# Patient Record
Sex: MALE | Race: WHITE | NOT HISPANIC OR LATINO | Employment: FULL TIME | URBAN - METROPOLITAN AREA
[De-identification: names, ages, dates, MRNs, and addresses within clinical notes are randomized per-mention and may not be internally consistent; named-entity substitution may affect disease eponyms.]

---

## 2017-01-03 ENCOUNTER — TRANSFERRED RECORDS (OUTPATIENT)
Dept: HEALTH INFORMATION MANAGEMENT | Facility: CLINIC | Age: 47
End: 2017-01-03

## 2017-01-10 ENCOUNTER — OFFICE VISIT (OUTPATIENT)
Dept: FAMILY MEDICINE | Facility: CLINIC | Age: 47
End: 2017-01-10
Payer: COMMERCIAL

## 2017-01-10 VITALS
TEMPERATURE: 97.9 F | WEIGHT: 242 LBS | BODY MASS INDEX: 32.78 KG/M2 | SYSTOLIC BLOOD PRESSURE: 136 MMHG | HEIGHT: 72 IN | OXYGEN SATURATION: 96 % | HEART RATE: 96 BPM | DIASTOLIC BLOOD PRESSURE: 86 MMHG

## 2017-01-10 DIAGNOSIS — M25.562 ACUTE PAIN OF LEFT KNEE: ICD-10-CM

## 2017-01-10 DIAGNOSIS — G43.109 MIGRAINE WITH AURA AND WITHOUT STATUS MIGRAINOSUS, NOT INTRACTABLE: ICD-10-CM

## 2017-01-10 DIAGNOSIS — D12.6 BENIGN NEOPLASM OF COLON, UNSPECIFIED PART OF COLON: ICD-10-CM

## 2017-01-10 DIAGNOSIS — I10 HYPERTENSION GOAL BP (BLOOD PRESSURE) < 140/90: ICD-10-CM

## 2017-01-10 DIAGNOSIS — Z79.4 TYPE 2 DIABETES MELLITUS WITH STABLE PROLIFERATIVE RETINOPATHY, WITH LONG-TERM CURRENT USE OF INSULIN, UNSPECIFIED LATERALITY (H): ICD-10-CM

## 2017-01-10 DIAGNOSIS — E11.3559 TYPE 2 DIABETES MELLITUS WITH STABLE PROLIFERATIVE RETINOPATHY, WITH LONG-TERM CURRENT USE OF INSULIN, UNSPECIFIED LATERALITY (H): ICD-10-CM

## 2017-01-10 DIAGNOSIS — Z12.11 SCREEN FOR COLON CANCER: ICD-10-CM

## 2017-01-10 DIAGNOSIS — R45.86 MOOD CHANGES: ICD-10-CM

## 2017-01-10 DIAGNOSIS — E78.5 HYPERLIPIDEMIA WITH TARGET LDL LESS THAN 70: ICD-10-CM

## 2017-01-10 DIAGNOSIS — K21.9 GASTROESOPHAGEAL REFLUX DISEASE WITHOUT ESOPHAGITIS: ICD-10-CM

## 2017-01-10 DIAGNOSIS — Z00.00 ENCOUNTER FOR ROUTINE ADULT HEALTH EXAMINATION WITHOUT ABNORMAL FINDINGS: Primary | ICD-10-CM

## 2017-01-10 DIAGNOSIS — N52.9 ERECTILE DYSFUNCTION, UNSPECIFIED ERECTILE DYSFUNCTION TYPE: ICD-10-CM

## 2017-01-10 DIAGNOSIS — E11.3599 PROLIFERATIVE DIABETIC RETINOPATHY WITHOUT MACULAR EDEMA ASSOCIATED WITH TYPE 2 DIABETES MELLITUS (H): ICD-10-CM

## 2017-01-10 DIAGNOSIS — Z51.81 MEDICATION MONITORING ENCOUNTER: ICD-10-CM

## 2017-01-10 DIAGNOSIS — Z12.5 SCREENING FOR PROSTATE CANCER: ICD-10-CM

## 2017-01-10 DIAGNOSIS — E66.811 OBESITY (BMI 30.0-34.9): ICD-10-CM

## 2017-01-10 LAB
ALBUMIN UR-MCNC: 100 MG/DL
APPEARANCE UR: CLEAR
BILIRUB UR QL STRIP: NEGATIVE
COLOR UR AUTO: YELLOW
ERYTHROCYTE [DISTWIDTH] IN BLOOD BY AUTOMATED COUNT: 13 % (ref 10–15)
GLUCOSE UR STRIP-MCNC: NEGATIVE MG/DL
HBA1C MFR BLD: 10 % (ref 4.3–6)
HCT VFR BLD AUTO: 41.8 % (ref 40–53)
HGB BLD-MCNC: 14.6 G/DL (ref 13.3–17.7)
HGB UR QL STRIP: ABNORMAL
KETONES UR STRIP-MCNC: NEGATIVE MG/DL
LEUKOCYTE ESTERASE UR QL STRIP: NEGATIVE
MCH RBC QN AUTO: 27.7 PG (ref 26.5–33)
MCHC RBC AUTO-ENTMCNC: 34.9 G/DL (ref 31.5–36.5)
MCV RBC AUTO: 79 FL (ref 78–100)
MUCOUS THREADS #/AREA URNS LPF: PRESENT /LPF
NITRATE UR QL: NEGATIVE
PH UR STRIP: 5.5 PH (ref 5–7)
PLATELET # BLD AUTO: 214 10E9/L (ref 150–450)
RBC # BLD AUTO: 5.28 10E12/L (ref 4.4–5.9)
RBC #/AREA URNS AUTO: ABNORMAL /HPF (ref 0–2)
SP GR UR STRIP: >1.03 (ref 1–1.03)
URN SPEC COLLECT METH UR: ABNORMAL
UROBILINOGEN UR STRIP-ACNC: 0.2 EU/DL (ref 0.2–1)
WBC # BLD AUTO: 8.6 10E9/L (ref 4–11)
WBC #/AREA URNS AUTO: ABNORMAL /HPF (ref 0–2)

## 2017-01-10 PROCEDURE — 85027 COMPLETE CBC AUTOMATED: CPT | Performed by: FAMILY MEDICINE

## 2017-01-10 PROCEDURE — 84443 ASSAY THYROID STIM HORMONE: CPT | Performed by: FAMILY MEDICINE

## 2017-01-10 PROCEDURE — 36415 COLL VENOUS BLD VENIPUNCTURE: CPT | Performed by: FAMILY MEDICINE

## 2017-01-10 PROCEDURE — 83036 HEMOGLOBIN GLYCOSYLATED A1C: CPT | Performed by: FAMILY MEDICINE

## 2017-01-10 PROCEDURE — 80061 LIPID PANEL: CPT | Performed by: FAMILY MEDICINE

## 2017-01-10 PROCEDURE — 80053 COMPREHEN METABOLIC PANEL: CPT | Performed by: FAMILY MEDICINE

## 2017-01-10 PROCEDURE — G0103 PSA SCREENING: HCPCS | Performed by: FAMILY MEDICINE

## 2017-01-10 PROCEDURE — 81001 URINALYSIS AUTO W/SCOPE: CPT | Performed by: FAMILY MEDICINE

## 2017-01-10 PROCEDURE — 82550 ASSAY OF CK (CPK): CPT | Performed by: FAMILY MEDICINE

## 2017-01-10 PROCEDURE — 99396 PREV VISIT EST AGE 40-64: CPT | Performed by: FAMILY MEDICINE

## 2017-01-10 PROCEDURE — 82043 UR ALBUMIN QUANTITATIVE: CPT | Performed by: FAMILY MEDICINE

## 2017-01-10 RX ORDER — GLIMEPIRIDE 4 MG/1
TABLET ORAL
Refills: 12 | COMMUNITY
Start: 2016-12-15 | End: 2018-05-29

## 2017-01-10 RX ORDER — OMEPRAZOLE 40 MG/1
40 CAPSULE, DELAYED RELEASE ORAL DAILY
Qty: 90 CAPSULE | Refills: 3 | Status: SHIPPED | OUTPATIENT
Start: 2017-01-10 | End: 2018-03-10

## 2017-01-10 RX ORDER — LOSARTAN POTASSIUM 50 MG/1
50 TABLET ORAL DAILY
Qty: 90 TABLET | Refills: 3 | Status: SHIPPED | OUTPATIENT
Start: 2017-01-10 | End: 2017-06-13

## 2017-01-10 RX ORDER — SILDENAFIL 100 MG/1
50-100 TABLET, FILM COATED ORAL DAILY PRN
Qty: 6 TABLET | Refills: 3 | Status: SHIPPED | OUTPATIENT
Start: 2017-01-10 | End: 2017-12-03

## 2017-01-10 RX ORDER — PRAVASTATIN SODIUM 40 MG
40 TABLET ORAL DAILY
Qty: 90 TABLET | Refills: 3 | Status: SHIPPED | OUTPATIENT
Start: 2017-01-10 | End: 2018-01-25

## 2017-01-10 NOTE — PROGRESS NOTES
SUBJECTIVE:     CC: Braden De Leon is an 46 year old male who presents for preventative health visit.     Migraine -- no recent problems, about 2 or less a year -- alleviated with aleve     Diabetes -- improved, monitoring, seeing Dr Bustos soon.    A1C     10.0   1/10/2017  A1C     10.5   11/15/2016  A1C      9.6   7/26/2016  A1C     11.8   2/16/2016  A1C     10.9   5/12/2015    Hyperlipidemia -- controlled with pravastatin     Recent Labs   Lab Test  02/16/16   1019  05/12/15   1040  09/09/14   1017   CHOL  157  167  174   HDL  39*  44  41   LDL  80  97  86   TRIG  189*  129  235*   CHOLHDLRATIO   --   3.8  4.2     Hypertension -- controlled with losartan     BP Readings from Last 3 Encounters:   01/10/17 136/86   09/13/16 128/82   02/16/16 112/80     Heartburn -- controlled with omeprazole    Esophageal stricture -- no recent problems     ED -- controlled with viagra     Healthy Habits:    Do you get at least three servings of calcium containing foods daily (dairy, green leafy vegetables, etc.)? yes    Amount of exercise or daily activities, outside of work: 2-3 day(s) per week    Problems taking medications regularly No    Medication side effects: No    Have you had an eye exam in the past two years? yes    Do you see a dentist twice per year? yes  Do you have sleep apnea, excessive snoring or daytime drowsiness?no    Health Maintenance     Colonoscopy:  Every 5 years, last 2/12 (due) - ordered   FIT:  Yearly, last 1/13 (due)              PSA:  None on file   DEXA:  n/a    Health Maintenance Due   Topic Date Due     PNEUMOVAX 1X HI RISK PATIENT < 65 (NO IB MSG)  03/18/1970     FIT Q1 YR (NO INBASKET)  01/08/2014     INFLUENZA VACCINE (SYSTEM ASSIGNED)  09/01/2016     EYE EXAM Q1 YEAR( NO INBASKET)  11/10/2016       Current Problem List    Patient Active Problem List   Diagnosis     Esophageal reflux     Hypertension goal BP (blood pressure) < 140/90     Type 2 diabetes, HbA1c goal < 7% (H)     Advanced  directives, counseling/discussion     Migraine     Benign neoplasm of colon     Esophagitis     Esophageal stricture     Tarsal tunnel syndrome     Other enthesopathy of ankle and tarsus     ED (erectile dysfunction)     Obesity (BMI 30.0-34.9)     Hyperlipidemia with target LDL less than 70     Proliferative diabetic retinopathy (H)       Past Medical History    Past Medical History   Diagnosis Date     Esophageal reflux 1995     Unspecified gastritis and gastroduodenitis without mention of hemorrhage 1995     Migraine 1986     rare migraines 2/yr     Other premature beats 11/07     bigeminy     Benign neoplasm of colon 11/07, 2/12     adenomatous polyps - due 5 yrs     Hypertension goal BP (blood pressure) < 140/90 3/09     Type 2 diabetes, HbA1c goal < 7% (H) 1992     Dr Bustos     Hyperlipidemia LDL goal < 70      Esophagitis 8/13     LA Grade A     Esophageal stricture 8/13     ED (erectile dysfunction)      Obesity (BMI 30.0-34.9)      Proliferative diabetic retinopathy (H)      [facility] - Avastatin - Dr Rosalva Zuñiga       Past Surgical History    Past Surgical History   Procedure Laterality Date     Hc repair ing hernia,5+y/o,reducibl  1978     rt     Colonoscopy  11/07, 2/12     polyps x 2 - internal hemorrhoids - adenomatous polyps - due 2015     Stress echo ([facility])  3/12     normal     Esophagoscopy, gastroscopy, duodenoscopy (egd), combined  8/20/2013     Esophagitis LA Grade A, esophageal stricture       Current Medications    Current Outpatient Prescriptions   Medication Sig Dispense Refill     sildenafil (VIAGRA) 100 MG cap/tab Take 0.5-1 tablets ( mg) by mouth daily as needed for erectile dysfunction 6 tablet 3     sertraline (ZOLOFT) 50 MG tablet Take 1 tablet (50 mg) by mouth daily 90 tablet 3     losartan (COZAAR) 50 MG tablet Take 1 tablet (50 mg) by mouth daily 90 tablet 3     pravastatin (PRAVACHOL) 40 MG tablet Take 1 tablet (40 mg) by mouth daily 90 tablet 3     omeprazole (PRILOSEC) 40 MG  capsule Take 1 capsule (40 mg) by mouth daily 90 capsule 3     VICTOZA PEN 18 MG/3ML soln   12     LEVEMIR FLEXTOUCH 100 UNIT/ML soln INJ 60 UNITS BID  6     blood glucose monitoring (SOFTCLIX) lancets USE WHEN TESTING BLOOD SUGARS TWICE DAILY. 2 Box 3     ACCU-CHEK JERMAINE PLUS test strip USE TO TEST BLOOD SUGARS TWICE DAILY. 200 each 3     aspirin 81 MG tablet Take 1 tablet (81 mg) by mouth daily 30 tablet      glimepiride (AMARYL) 4 MG tablet TK 1/2 TO 1 T PO QPM UTD  12     naproxen sodium (ANAPROX) 550 MG tablet TAKE 1 TABLET BY MOUTH TWICE DAILY, WITH MEALS 60 tablet 0       Allergies    Allergies   Allergen Reactions     Metformin Hcl [Riomet]      Nausea and throat tightening     Penicillins      anaphylaxis     Thiethylperazine Maleate      Torecan Throat Swelling       Immunizations    Immunization History   Administered Date(s) Administered     TD (ADULT, 7+) 04/01/2001       Family History    Family History   Problem Relation Age of Onset     Connective Tissue Disorder Father 70     DIABETES Father 55     Hypertension Mother 58     Respiratory Paternal Grandfather      Breast Cancer Maternal Grandmother        Social History    Social History     Social History     Marital Status:      Spouse Name: Lela     Number of Children: 1     Years of Education: 13     Occupational History     manage [employer]            Social History Main Topics     Smoking status: Never Smoker      Smokeless tobacco: Never Used     Alcohol Use: No     Drug Use: No     Sexual Activity:     Partners: Female     Other Topics Concern      Service Yes     [place]     Caffeine Concern Yes     24 oz diet     Exercise Yes     3-5/wk, Ref's     Seat Belt Yes     Parent/Sibling W/ Cabg, Mi Or Angioplasty Before 65f 55m? No     Social History Narrative       Today's PHQ-2 Score:   PHQ-2 ( 1999 [employer]) 1/10/2017 5/12/2015   Q1: Little interest or pleasure in doing things 0 1   Q2: Feeling down, depressed or hopeless 0 1   PHQ-2  Score 0 2   Little interest or pleasure in doing things - -   Feeling down, depressed or hopeless - -   PHQ-2 Score - -       Abuse: Current or Past(Physical, Sexual or Emotional)- No  Do you feel safe in your environment - Yes    Social History   Substance Use Topics     Smoking status: Never Smoker      Smokeless tobacco: Never Used     Alcohol Use: No     The patient does not drink >3 drinks per day nor >7 drinks per week.    Last PSA:   PSA   Date Value Ref Range Status   02/16/2016 0.80 0 - 4 ug/L Final       Recent Labs   Lab Test  02/16/16   1019  05/12/15   1040  09/09/14   1017   CHOL  157  167  174   HDL  39*  44  41   LDL  80  97  86   TRIG  189*  129  235*   CHOLHDLRATIO   --   3.8  4.2   NHDL  118   --    --        Reviewed orders with patient. Reviewed health maintenance and updated orders accordingly - Yes    All Histories reviewed and updated in Epic.    ROS:  10 point ROS of systems including Constitutional, Eyes, Respiratory, Cardiovascular, Gastroenterology, Genitourinary, Integumentary, Muscularskeletal, Psychiatric were all negative except for pertinent positives noted in my HPI.    OBJECTIVE:     /86 mmHg  Pulse 96  Temp(Src) 97.9  F (36.6  C) (Oral)  Ht 6' (1.829 m)  Wt 242 lb (109.77 kg)  BMI 32.81 kg/m2  SpO2 96%  EXAM:  GENERAL: healthy, alert and no distress, obese   EYES: Eyes grossly normal to inspection, PERRL and conjunctivae and sclerae normal  HENT: ear canals mild retraction bilaterally and TM's normal, nose and mouth without ulcers or lesions  NECK: no adenopathy, no asymmetry, masses, or scars and thyroid normal to palpation  RESP: lungs clear to auscultation - no rales, rhonchi or wheezes  CV: regular rate and rhythm, normal S1 S2, no S3 or S4, no murmur, click or rub, no peripheral edema and peripheral pulses strong  ABDOMEN: soft, nontender, no hepatosplenomegaly, no masses and bowel sounds normal   (male): normal male genitalia without lesions or urethral  discharge, no hernia  RECTAL: normal sphincter tone, no rectal masses, prostate normal size, smooth, nontender without nodules or masses  MS: no gross musculoskeletal defects noted, no edema  SKIN: no suspicious lesions or rashes  NEURO: mentation intact and speech normal  PSYCH: mentation appears normal, affect normal/bright  Foot exam per Dr Bustos    Results for orders placed or performed in visit on 01/10/17   CBC with platelets   Result Value Ref Range    WBC 8.6 4.0 - 11.0 10e9/L    RBC Count 5.28 4.4 - 5.9 10e12/L    Hemoglobin 14.6 13.3 - 17.7 g/dL    Hematocrit 41.8 40.0 - 53.0 %    MCV 79 78 - 100 fl    MCH 27.7 26.5 - 33.0 pg    MCHC 34.9 31.5 - 36.5 g/dL    RDW 13.0 10.0 - 15.0 %    Platelet Count 214 150 - 450 10e9/L   *UA reflex to Microscopic   Result Value Ref Range    Color Urine Yellow     Appearance Urine Clear     Glucose Urine Negative NEG mg/dL    Bilirubin Urine Negative NEG    Ketones Urine Negative NEG mg/dL    Specific Gravity Urine >1.030 1.003 - 1.035    Blood Urine Trace (A) NEG    pH Urine 5.5 5.0 - 7.0 pH    Protein Albumin Urine 100 (A) NEG mg/dL    Urobilinogen Urine 0.2 0.2 - 1.0 EU/dL    Nitrite Urine Negative NEG    Leukocyte Esterase Urine Negative NEG    Source Midstream Urine    Hemoglobin A1c   Result Value Ref Range    Hemoglobin A1C 10.0 (H) 4.3 - 6.0 %   Urine Microscopic   Result Value Ref Range    WBC Urine O - 2 0 - 2 /HPF    RBC Urine O - 2 0 - 2 /HPF    Mucous Urine Present (A) NEG /LPF     ASSESSMENT/PLAN:         ICD-10-CM    1. Encounter for routine adult health examination without abnormal findings Z00.00 Comprehensive metabolic panel     Lipid panel reflex to direct LDL     CK total     CBC with platelets     *UA reflex to Microscopic     Albumin Random Urine Quantitative     TSH with free T4 reflex     Prostate spec antigen screen     Hemoglobin A1c     Fecal colorectal cancer screen (FIT)     Urine Microscopic   2. Type 2 diabetes mellitus with stable  proliferative retinopathy, with long-term current use of insulin, unspecified laterality (H) E11.3559 Comprehensive metabolic panel    Z79.4 Lipid panel reflex to direct LDL     *UA reflex to Microscopic     Albumin Random Urine Quantitative     Hemoglobin A1c     ORTHOTICS REFERRAL     losartan (COZAAR) 50 MG tablet     pravastatin (PRAVACHOL) 40 MG tablet   3. Proliferative diabetic retinopathy without macular edema associated with type 2 diabetes mellitus (H) E11.3599 Comprehensive metabolic panel     Lipid panel reflex to direct LDL     *UA reflex to Microscopic     Albumin Random Urine Quantitative     Hemoglobin A1c     pravastatin (PRAVACHOL) 40 MG tablet   4. Hypertension goal BP (blood pressure) < 140/90 I10 Comprehensive metabolic panel     *UA reflex to Microscopic     Albumin Random Urine Quantitative     losartan (COZAAR) 50 MG tablet   5. Hyperlipidemia with target LDL less than 70 E78.5 Comprehensive metabolic panel     Lipid panel reflex to direct LDL     CK total     pravastatin (PRAVACHOL) 40 MG tablet   6. Acute pain of left knee M25.562 MR Knee Left w/o Contrast   7. Gastroesophageal reflux disease without esophagitis K21.9 CBC with platelets     omeprazole (PRILOSEC) 40 MG capsule   8. Mood changes (H) F39 sertraline (ZOLOFT) 50 MG tablet   9. Migraine with aura and without status migrainosus, not intractable G43.109    10. Erectile dysfunction, unspecified erectile dysfunction type N52.9 sildenafil (VIAGRA) 100 MG cap/tab   11. Obesity (BMI 30.0-34.9) E66.9 TSH with free T4 reflex   12. Benign neoplasm of colon, unspecified part of colon D12.6 Fecal colorectal cancer screen (FIT)     GASTROENTEROLOGY ADULT REFERRAL +/- PROCEDURE   13. Screen for colon cancer Z12.11 Fecal colorectal cancer screen (FIT)     GASTROENTEROLOGY ADULT REFERRAL +/- PROCEDURE   14. Screening for prostate cancer Z12.5 Prostate spec antigen screen   15. Medication monitoring encounter Z51.81 Comprehensive metabolic panel      Lipid panel reflex to direct LDL     CK total     CBC with platelets     *UA reflex to Microscopic     Albumin Random Urine Quantitative     TSH with free T4 reflex     Hemoglobin A1c     Discussed treatment/modality options, including risk and benefits, he desires advised aspirin 81 mg po daily, advised 1 multivitamin per day, advised calcium 2410-9278 mg/d and Vitamin D 800-1200 IU/d, advised dentist every 6 months, advised diet, exercise, and weight loss, advised opthalmologist every 1-2 years, advised self testicular exam q month, diabetic education, further health care maintenance, medication refill(viagra, losartan, sertraline, pravastatin, omeprazole) and referral(orthotics). All diagnosis above reviewed and noted above, otherwise stable.  See [employer] orders for further details.  Follow up as needed.    Refill viagra, losartan, sertraline, pravastatin, omeprazole     Referral orthotics     Braden declined all vaccinations today.     Health Maintenance Due   Topic Date Due     PNEUMOVAX 1X HI RISK PATIENT < 65 (NO IB MSG)  03/18/1970     FIT Q1 YR (NO INBASKET)  01/08/2014     INFLUENZA VACCINE (SYSTEM ASSIGNED)  09/01/2016     EYE EXAM Q1 YEAR( NO INBASKET)  11/10/2016       COUNSELING:  Reviewed preventive health counseling, as reflected in patient instructions       Regular exercise       Healthy diet/nutrition     reports that he has never smoked. He has never used smokeless tobacco.    Estimated body mass index is 32.81 kg/(m^2) as calculated from the following:    Height as of this encounter: 6' (1.829 m).    Weight as of this encounter: 242 lb (109.77 kg).   Weight management plan: diet and exercise    Counseling Resources:  ATP IV Guidelines  Pooled Cohorts Equation Calculator  FRAX Risk Assessment  ICSI Preventive Guidelines  Dietary Guidelines for Americans, 2010  USDA's MyPlate  ASA Prophylaxis  Lung CA Screening    This document serves as a record of the services and decisions personally  performed and made by Pepe Spear MD FAAFP. It was created on their behalf by Zaira Reynaga, a trained medical scribe. The creation of this document is based the provider's statements to the medical scribe.  Zaira Reynaga January 10, 2017 9:59 AM              Pepe Spear MD, FAAFP    [address]  [id number] [phone] Fax

## 2017-01-10 NOTE — MR AVS SNAPSHOT
After Visit Summary   1/10/2017    Braden De Leon    MRN: [id number]           Patient Information     Date Of Birth          1970        Visit Information        Provider Department      1/10/2017 9:40 AM Pepe Spear MD [facility] [place]        Today's Diagnoses     Encounter for routine adult health examination without abnormal findings    -  1     Type 2 diabetes mellitus with stable proliferative retinopathy, with long-term current use of insulin, unspecified laterality (H)         Proliferative diabetic retinopathy without macular edema associated with type 2 diabetes mellitus (H)         Hypertension goal BP (blood pressure) < 140/90         Hyperlipidemia with target LDL less than 70         Gastroesophageal reflux disease without esophagitis         Mood changes (H)         Migraine with aura and without status migrainosus, not intractable         Erectile dysfunction, unspecified erectile dysfunction type         Obesity (BMI 30.0-34.9)         Benign neoplasm of colon, unspecified part of colon         Screen for colon cancer         Screening for prostate cancer         Medication monitoring encounter           Care Instructions        [facility]                        To reach your care team during and after hours:   [phone]  To reach our pharmacy:        [phone]    Clinic Hours                        Our clinic hours are:    Monday   7:30 am to 7:00 pm                  Tuesday through Friday 7:30 am to 5:00 pm                             Saturday   8:00 am to 12:00 pm      Sunday   Closed      Pharmacy Hours                        Our pharmacy hours are:    Monday   8:30 am to 7:00 pm       Tuesday to Friday  8:30 am to 6:00 pm                       Saturday    9:00 am to 1:00 pm              Sunday    Closed              There is also information available at our web site:  [email or web address]    If your provider ordered any lab tests and you do  not receive the results within 10 business days, please call the clinic.    If you need a medication refill please contact your pharmacy.  Please allow 2-3 business days for your refill to be completed.    Our clinic offers telephone visits and e visits.  Please ask one of your team members to explain more.      Use [employer] (secure email communication and access to your chart) to send your primary care provider a message or make an appointment. Ask someone on your Team how to sign up for [employer].  Immunizations                      Immunization History   Administered Date(s) Administered     TD (ADULT, 7+) 04/01/2001        Health Maintenance                         Health Maintenance Due   Topic Date Due     Pneumovax Vaccine  03/18/1970     Colon Cancer Screening - FIT Test - yearly  01/08/2014     Wellness Visit with your Primary Provider - yearly  09/09/2015     Flu Vaccine - yearly  09/01/2016     Eye Exam - yearly  11/10/2016         Preventive Health Recommendations  Male Ages 40 to 49    Yearly exam:             See your health care provider every year in order to  o   Review health changes.   o   Discuss preventive care.    o   Review your medicines if your doctor has prescribed any.    You should be tested each year for STDs (sexually transmitted diseases) if you re at risk.     Have a cholesterol test every 5 years.     Have a colonoscopy (test for colon cancer) if someone in your family has had colon cancer or polyps before age 50.     After age 45, have a diabetes test (fasting glucose). If you are at risk for diabetes, you should have this test every 3 years.      Talk with your health care provider about whether or not a prostate cancer screening test (PSA) is right for you.    Shots: Get a flu shot each year. Get a tetanus shot every 10 years.     Nutrition:    Eat at least 5 servings of fruits and vegetables daily.     Eat whole-grain bread, whole-wheat pasta and brown rice instead of white grains  and rice.     Talk to your provider about Calcium and Vitamin D.     Lifestyle    Exercise for at least 150 minutes a week (30 minutes a day, 5 days a week). This will help you control your weight and prevent disease.     Limit alcohol to one drink per day.     No smoking.     Wear sunscreen to prevent skin cancer.     See your dentist every six months for an exam and cleaning.            Follow-ups after your visit        Additional Services     GASTROENTEROLOGY ADULT REFERRAL +/- PROCEDURE       Your provider has referred you to Gastroenterology Services.    English    Procedure/Referral: PROCEDURE ONLY - COLONOSCOPY - Reason for procedure: Screening and FU polyps  FMG: [facility]  (all patients will be contacted by GI Ritu to schedule appointment - This includes [facility], Colon & Rectal Surgeons, & Dr. Carter) - Karen [phone]   [email or web address]/     Please be aware that coverage of these services is subject to the terms and limitations of your health insurance plan.  Call member services at your health plan with any benefit or coverage questions.  Any procedures must be performed at a [facility] facility OR coordinated by your clinic's referral office.    Please bring the following with you to your appointment:    (1) Any X-Rays, CTs or MRIs which have been performed.  Contact the facility where they were done to arrange for  prior to your scheduled appointment.    (2) List of current medications   (3) This referral request   (4) Any documents/labs given to you for this referral            ORTHOTICS REFERRAL       **This referral order prints off in the [facility]  (Orthotics & Prosthetics) Central Scheduling Office**    The [facility] Central Scheduling Staff will contact the patient to schedule appointments.     Central Scheduling Contact Information: [phone] ([place])    Orthotics: Foot  Orthotics    Please be aware that coverage of these services is subject to the terms and limitations of your health insurance plan.  Call member services at your health plan with any benefit or coverage questions.      Please bring the following to your appointment:    >>   Any x-rays, CTs or MRIs which have been performed.  Contact the facility where they were done to arrange for  prior to your scheduled appointment.    >>   List of current medications   >>   This referral request   >>   Any documents/labs given to you for this referral                  Future tests that were ordered for you today     Open Future Orders        Priority Expected Expires Ordered    Fecal colorectal cancer screen (FIT) Routine 1/31/2017 4/4/2017 1/10/2017            Who to contact     If you have questions or need follow up information about today's clinic visit or your schedule please contact [facility] directly at [phone].  Normal or non-critical lab and imaging results will be communicated to you by [employer], letter or phone within 4 business days after the clinic has received the results. If you do not hear from us within 7 days, please contact the clinic through [employer] or phone. If you have a critical or abnormal lab result, we will notify you by phone as soon as possible.  Submit refill requests through [employer] or call your pharmacy and they will forward the refill request to us. Please allow 3 business days for your refill to be completed.          Additional Information About Your Visit        [employer] Information     [employer] gives you secure access to your electronic health record. If you see a primary care provider, you can also send messages to your care team and make appointments. If you have questions, please call your primary care clinic.  If you do not have a primary care provider, please call [phone] and they will assist you.        Care EveryWhere ID     This is your Care EveryWhere  ID. This could be used by other organizations to access your [facility] medical records  [id number]        Your Vitals Were     Pulse Temperature Height BMI (Body Mass Index) Pulse Oximetry       96 97.9  F (36.6  C) (Oral) 6' (1.829 m) 32.81 kg/m2 96%        Blood Pressure from Last 3 Encounters:   01/10/17 136/86   09/13/16 128/82   02/16/16 112/80    Weight from Last 3 Encounters:   01/10/17 242 lb (109.77 kg)   09/13/16 236 lb (107.049 kg)   02/16/16 247 lb (112.038 kg)              We Performed the Following     *UA reflex to Microscopic     Albumin Random Urine Quantitative     CBC with platelets     CK total     Comprehensive metabolic panel     DEPRESSION ACTION PLAN (DAP)     GASTROENTEROLOGY ADULT REFERRAL +/- PROCEDURE     Hemoglobin A1c     Lipid panel reflex to direct LDL     ORTHOTICS REFERRAL     Prostate spec antigen screen     TSH with free T4 reflex          Today's Medication Changes          These changes are accurate as of: 1/10/17 10:41 AM.  If you have any questions, ask your nurse or doctor.               These medicines have changed or have updated prescriptions.        Dose/Directions    sertraline 50 MG tablet   Commonly known as:  ZOLOFT   This may have changed:  See the new instructions.   Used for:  Mood changes (H)   Changed by:  Pepe Spear MD        Dose:  50 mg   Take 1 tablet (50 mg) by mouth daily   Quantity:  90 tablet   Refills:  3         Stop taking these medicines if you haven't already. Please contact your care team if you have questions.     ACETAMINOPHEN PO   Stopped by:  Pepe Spear MD           albuterol 108 (90 BASE) MCG/ACT Inhaler   Commonly known as:  PROAIR HFA/PROVENTIL HFA/VENTOLIN HFA   Stopped by:  Pepe Spear MD                Where to get your medicines      These medications were sent to [facility] [address] [facility] [address] AT [facility] [address] [facility] [address]     Phone:  [phone] -  losartan 50 MG tablet  - omeprazole 40 MG capsule  - pravastatin 40 MG tablet  - sertraline 50 MG tablet  - sildenafil 100 MG cap/tab             Primary Care Provider Office Phone # Fax #    Pepe Spear [phone]       [facility] [address] [facility] [address]        Thank you!     Thank you for choosing [facility]  for your care. Our goal is always to provide you with excellent care. Hearing back from our patients is one way we can continue to improve our services. Please take a few minutes to complete the written survey that you may receive in the mail after your visit with us. Thank you!             Your Updated Medication List - Protect others around you: Learn how to safely use, store and throw away your medicines at www.disposemymeds.org.          This list is accurate as of: 1/10/17 10:41 AM.  Always use your most recent med list.                   Brand Name Dispense Instructions for use    ACCU-CHEK JERMAINE PLUS test strip   Generic drug:  blood glucose monitoring     200 each    USE TO TEST BLOOD SUGARS TWICE DAILY.       aspirin 81 MG tablet     30 tablet    Take 1 tablet (81 mg) by mouth daily       blood glucose monitoring lancets     2 Box    USE WHEN TESTING BLOOD SUGARS TWICE DAILY.       glimepiride 4 MG tablet    AMARYL     TK 1/2 TO 1 T PO QPM UTD       LEVEMIR FLEXTOUCH 100 UNIT/ML injection   Generic drug:  insulin detemir      INJ 60 UNITS BID       losartan 50 MG tablet    COZAAR    90 tablet    Take 1 tablet (50 mg) by mouth daily       naproxen sodium 550 MG tablet    ANAPROX    60 tablet    TAKE 1 TABLET BY MOUTH TWICE DAILY, WITH MEALS       omeprazole 40 MG capsule    priLOSEC    90 capsule    Take 1 capsule (40 mg) by mouth daily       pravastatin 40 MG tablet    PRAVACHOL    90 tablet    Take 1 tablet (40 mg) by mouth daily       sertraline 50 MG tablet    ZOLOFT    90 tablet    Take 1 tablet (50 mg) by mouth daily        sildenafil 100 MG cap/tab    VIAGRA    6 tablet    Take 0.5-1 tablets ( mg) by mouth daily as needed for erectile dysfunction       VICTOZA PEN 18 MG/3ML soln   Generic drug:  liraglutide

## 2017-01-10 NOTE — PATIENT INSTRUCTIONS
[facility]                        To reach your care team during and after hours:   [phone]  To reach our pharmacy:        [phone]    Clinic Hours                        Our clinic hours are:    Monday   7:30 am to 7:00 pm                  Tuesday through Friday 7:30 am to 5:00 pm                             Saturday   8:00 am to 12:00 pm      Sunday   Closed      Pharmacy Hours                        Our pharmacy hours are:    Monday   8:30 am to 7:00 pm       Tuesday to Friday  8:30 am to 6:00 pm                       Saturday    9:00 am to 1:00 pm              Sunday    Closed              There is also information available at our web site:  [email or web address]    If your provider ordered any lab tests and you do not receive the results within 10 business days, please call the clinic.    If you need a medication refill please contact your pharmacy.  Please allow 2-3 business days for your refill to be completed.    Our clinic offers telephone visits and e visits.  Please ask one of your team members to explain more.      Use [employer] (secure email communication and access to your chart) to send your primary care provider a message or make an appointment. Ask someone on your Team how to sign up for [employer].  Immunizations                      Immunization History   Administered Date(s) Administered     TD (ADULT, 7+) 04/01/2001        Health Maintenance                         Health Maintenance Due   Topic Date Due     Pneumovax Vaccine  03/18/1970     Colon Cancer Screening - FIT Test - yearly  01/08/2014     Wellness Visit with your Primary Provider - yearly  09/09/2015     Flu Vaccine - yearly  09/01/2016     Eye Exam - yearly  11/10/2016         Preventive Health Recommendations  Male Ages 40 to 49    Yearly exam:             See your health care provider every year in order to  o   Review health changes.   o   Discuss preventive care.    o   Review your medicines if your doctor  has prescribed any.    You should be tested each year for STDs (sexually transmitted diseases) if you re at risk.     Have a cholesterol test every 5 years.     Have a colonoscopy (test for colon cancer) if someone in your family has had colon cancer or polyps before age 50.     After age 45, have a diabetes test (fasting glucose). If you are at risk for diabetes, you should have this test every 3 years.      Talk with your health care provider about whether or not a prostate cancer screening test (PSA) is right for you.    Shots: Get a flu shot each year. Get a tetanus shot every 10 years.     Nutrition:    Eat at least 5 servings of fruits and vegetables daily.     Eat whole-grain bread, whole-wheat pasta and brown rice instead of white grains and rice.     Talk to your provider about Calcium and Vitamin D.     Lifestyle    Exercise for at least 150 minutes a week (30 minutes a day, 5 days a week). This will help you control your weight and prevent disease.     Limit alcohol to one drink per day.     No smoking.     Wear sunscreen to prevent skin cancer.     See your dentist every six months for an exam and cleaning.

## 2017-01-10 NOTE — Clinical Note
My Depression Action Plan  Name: Braden De Leon   Date of Birth 1970  Date: 1/10/2017    My doctor: Pepe Spear   My clinic: [facility] [address] [phone]          GREEN    ZONE   Good Control    What it looks like:     Things are going generally well. You have normal up s and down s. You may even feel depressed from time to time, but bad moods usually last less than a day.   What you need to do:  1. Continue to care for yourself (see self care plan)  2. Check your depression survival kit and update it as needed  3. Follow your physician s recommendations including any medication.  4. Do not stop taking medication unless you consult with your physician first.           YELLOW         ZONE Getting Worse    What it looks like:     Depression is starting to interfere with your life.     It may be hard to get out of bed; you may be starting to isolate yourself from others.    Symptoms of depression are starting to last most all day and this has happened for several days.     You may have suicidal thoughts but they are not constant.   What you need to do:     1. Call your care team, your response to treatment will improve if you keep your care team informed of your progress. Yellow periods are signs an adjustment may need to be made.     2. Continue your self-care, even if you have to fake it!    3. Talk to someone in your support network    4. Open up your depression survival kit           RED    ZONE Medical Alert - Get Help    What it looks like:     Depression is seriously interfering with your life.     You may experience these or other symptoms: You can t get out of bed most days, can t work or engage in other necessary activities, you have trouble taking care of basic hygiene, or basic responsibilities, thoughts of suicide or death that will not go away, self-injurious behavior.     What you need to do:  1. Call your care team  and request a same-day appointment. If they are not available (weekends or after hours) call your local crisis line, emergency room or 911.      Electronically signed by: Audrey Harmon, January 10, 2017    Depression Self Care Plan / Survival Kit    Self-Care for Depression  Here s the deal. Your body and mind are really not as separate as most people think.  What you do and think affects how you feel and how you feel influences what you do and think. This means if you do things that people who feel good do, it will help you feel better.  Sometimes this is all it takes.  There is also a place for medication and therapy depending on how severe your depression is, so be sure to consult with your medical provider and/ or Behavioral Health Consultant if your symptoms are worsening or not improving.     In order to better manage my stress, I will:    Exercise  Get some form of exercise, every day. This will help reduce pain and release endorphins, the  feel good  chemicals in your brain. This is almost as good as taking antidepressants!  This is not the same as joining a gym and then never going! (they count on that by the way ) It can be as simple as just going for a walk or doing some gardening, anything that will get you moving.      Hygiene   Maintain good hygiene (Get out of bed in the morning, Make your bed, Brush your teeth, Take a shower, and Get dressed like you were going to work, even if you are unemployed).  If your clothes don't fit try to get ones that do.    Diet  I will strive to eat foods that are good for me, drink plenty of water, and avoid excessive sugar, caffeine, alcohol, and other mood-altering substances.  Some foods that are helpful in depression are: complex carbohydrates, B vitamins, flaxseed, fish or fish oil, fresh fruits and vegetables.    Psychotherapy  I agree to participate in Individual Therapy (if recommended).    Medication  If prescribed medications, I agree to take them.  Missing  doses can result in serious side effects.  I understand that drinking alcohol, or other illicit drug use, may cause potential side effects.  I will not stop my medication abruptly without first discussing it with my provider.    Staying Connected With Others  I will stay in touch with my friends, family members, and my primary care provider/team.    Use your imagination  Be creative.  We all have a creative side; it doesn t matter if it s oil painting, sand castles, or mud pies! This will also kick up the endorphins.    Witness Beauty  (AKA stop and smell the roses) Take a look outside, even in mid-winter. Notice colors, textures. Watch the squirrels and birds.     Service to others  Be of service to others.  There is always someone else in need.  By helping others we can  get out of ourselves  and remember the really important things.  This also provides opportunities for practicing all the other parts of the program.    Humor  Laugh and be silly!  Adjust your TV habits for less news and crime-drama and more comedy.    Control your stress  Try breathing deep, massage therapy, biofeedback, and meditation. Find time to relax each day.     My support system    Clinic Contact:  Phone number:    Contact 1:  Phone number:    Contact 2:  Phone number:    [place]/:  Phone number:    Therapist:  Phone number:    Local crisis center:    Phone number:    Other community support:  Phone number:

## 2017-01-10 NOTE — NURSING NOTE
Chief Complaint   Patient presents with     Physical       Initial /86 mmHg  Pulse 96  Temp(Src) 97.9  F (36.6  C) (Oral)  Ht 6' (1.829 m)  Wt 242 lb (109.77 kg)  BMI 32.81 kg/m2  SpO2 96% Estimated body mass index is 32.81 kg/(m^2) as calculated from the following:    Height as of this encounter: 6' (1.829 m).    Weight as of this encounter: 242 lb (109.77 kg)..  BP completed using cuff size: zabrina Harmon MA

## 2017-01-11 LAB
ALBUMIN SERPL-MCNC: 3.9 G/DL (ref 3.4–5)
ALP SERPL-CCNC: 107 U/L (ref 40–150)
ALT SERPL W P-5'-P-CCNC: 35 U/L (ref 0–70)
ANION GAP SERPL CALCULATED.3IONS-SCNC: 8 MMOL/L (ref 3–14)
AST SERPL W P-5'-P-CCNC: 26 U/L (ref 0–45)
BILIRUB SERPL-MCNC: 1.1 MG/DL (ref 0.2–1.3)
BUN SERPL-MCNC: 12 MG/DL (ref 7–30)
CALCIUM SERPL-MCNC: 8.8 MG/DL (ref 8.5–10.1)
CHLORIDE SERPL-SCNC: 109 MMOL/L (ref 94–109)
CHOLEST SERPL-MCNC: 136 MG/DL
CK SERPL-CCNC: 203 U/L (ref 30–300)
CO2 SERPL-SCNC: 26 MMOL/L (ref 20–32)
CREAT SERPL-MCNC: 0.79 MG/DL (ref 0.66–1.25)
CREAT UR-MCNC: 245 MG/DL
GFR SERPL CREATININE-BSD FRML MDRD: ABNORMAL ML/MIN/1.7M2
GLUCOSE SERPL-MCNC: 137 MG/DL (ref 70–99)
HDLC SERPL-MCNC: 41 MG/DL
LDLC SERPL CALC-MCNC: 66 MG/DL
MICROALBUMIN UR-MCNC: 720 MG/L
MICROALBUMIN/CREAT UR: 293.88 MG/G CR (ref 0–17)
NONHDLC SERPL-MCNC: 95 MG/DL
POTASSIUM SERPL-SCNC: 3.8 MMOL/L (ref 3.4–5.3)
PROT SERPL-MCNC: 7.1 G/DL (ref 6.8–8.8)
PSA SERPL-ACNC: 0.78 UG/L (ref 0–4)
SODIUM SERPL-SCNC: 143 MMOL/L (ref 133–144)
TRIGL SERPL-MCNC: 147 MG/DL
TSH SERPL DL<=0.005 MIU/L-ACNC: 0.4 MU/L (ref 0.4–4)

## 2017-01-25 ENCOUNTER — TELEPHONE (OUTPATIENT)
Dept: FAMILY MEDICINE | Facility: CLINIC | Age: 47
End: 2017-01-25

## 2017-01-25 NOTE — TELEPHONE ENCOUNTER
Date Forms was received: January 25, 2017    Forms received by: Fax    Last office visit: 01/01/2017    Purpose of Form:  DM shoe/Insert    When the form is due:  ASAP    How the form needs to be returned for patient:  Fax [phone]    Form currently placed  [place]  Mary Kay Garcia LPN

## 2017-02-21 ENCOUNTER — HOSPITAL ENCOUNTER (OUTPATIENT)
Facility: CLINIC | Age: 47
Discharge: HOME | End: 2017-02-21
Attending: INTERNAL MEDICINE | Admitting: INTERNAL MEDICINE
Payer: COMMERCIAL

## 2017-02-21 VITALS
WEIGHT: 238 LBS | RESPIRATION RATE: 16 BRPM | BODY MASS INDEX: 32.23 KG/M2 | HEIGHT: 72 IN | SYSTOLIC BLOOD PRESSURE: 136 MMHG | OXYGEN SATURATION: 93 % | DIASTOLIC BLOOD PRESSURE: 96 MMHG

## 2017-02-21 LAB
COLONOSCOPY: NORMAL
GLUCOSE BLDC GLUCOMTR-MCNC: 237 MG/DL (ref 70–99)

## 2017-02-21 PROCEDURE — 25000125 ZZHC RX 250: Performed by: INTERNAL MEDICINE

## 2017-02-21 PROCEDURE — 45378 DIAGNOSTIC COLONOSCOPY: CPT | Performed by: INTERNAL MEDICINE

## 2017-02-21 PROCEDURE — G0105 COLORECTAL SCRN; HI RISK IND: HCPCS | Performed by: INTERNAL MEDICINE

## 2017-02-21 PROCEDURE — 25000128 H RX IP 250 OP 636: Performed by: INTERNAL MEDICINE

## 2017-02-21 PROCEDURE — 82962 GLUCOSE BLOOD TEST: CPT

## 2017-02-21 PROCEDURE — G0500 MOD SEDAT ENDO SERVICE >5YRS: HCPCS | Performed by: INTERNAL MEDICINE

## 2017-02-21 RX ORDER — FLUMAZENIL 0.1 MG/ML
0.2 INJECTION, SOLUTION INTRAVENOUS
Status: DISCONTINUED | OUTPATIENT
Start: 2017-02-21 | End: 2017-02-21 | Stop reason: HOSPADM

## 2017-02-21 RX ORDER — ONDANSETRON 4 MG/1
4 TABLET, ORALLY DISINTEGRATING ORAL EVERY 6 HOURS PRN
Status: DISCONTINUED | OUTPATIENT
Start: 2017-02-21 | End: 2017-02-21 | Stop reason: HOSPADM

## 2017-02-21 RX ORDER — NALOXONE HYDROCHLORIDE 0.4 MG/ML
.1-.4 INJECTION, SOLUTION INTRAMUSCULAR; INTRAVENOUS; SUBCUTANEOUS
Status: DISCONTINUED | OUTPATIENT
Start: 2017-02-21 | End: 2017-02-21 | Stop reason: HOSPADM

## 2017-02-21 RX ORDER — LIDOCAINE 40 MG/G
CREAM TOPICAL
Status: DISCONTINUED | OUTPATIENT
Start: 2017-02-21 | End: 2017-02-21 | Stop reason: HOSPADM

## 2017-02-21 RX ORDER — FENTANYL CITRATE 50 UG/ML
INJECTION, SOLUTION INTRAMUSCULAR; INTRAVENOUS PRN
Status: DISCONTINUED | OUTPATIENT
Start: 2017-02-21 | End: 2017-02-21 | Stop reason: HOSPADM

## 2017-02-21 RX ORDER — ONDANSETRON 2 MG/ML
4 INJECTION INTRAMUSCULAR; INTRAVENOUS
Status: DISCONTINUED | OUTPATIENT
Start: 2017-02-21 | End: 2017-02-21 | Stop reason: HOSPADM

## 2017-02-21 RX ORDER — ONDANSETRON 2 MG/ML
4 INJECTION INTRAMUSCULAR; INTRAVENOUS EVERY 6 HOURS PRN
Status: DISCONTINUED | OUTPATIENT
Start: 2017-02-21 | End: 2017-02-21 | Stop reason: HOSPADM

## 2017-02-21 NOTE — H&P
Pre-Endoscopy History and Physical     Braden De Leon MRN# [id number]   YOB: 1970 Age: 46 year old     Date of Procedure: 2/21/2017  Primary care provider: Pepe Spear  Type of Endoscopy: Colonoscopy with possible biopsy, possible polypectomy  Reason for Procedure: screen  Type of Anesthesia Anticipated: Conscious Sedation    HPI:    Braden is a 46 year old male who will be undergoing the above procedure.      A history and physical has been performed. The patient's medications and allergies have been reviewed. The risks and benefits of the procedure and the sedation options and risks were discussed with the patient.  All questions were answered and informed consent was obtained.      He denies a personal or family history of anesthesia complications or bleeding disorders.     Patient Active Problem List   Diagnosis     Esophageal reflux     Hypertension goal BP (blood pressure) < 140/90     Type 2 diabetes, HbA1c goal < 7% (H)     Advanced directives, counseling/discussion     Migraine     Benign neoplasm of colon     Esophagitis     Esophageal stricture     Tarsal tunnel syndrome     Other enthesopathy of ankle and tarsus     ED (erectile dysfunction)     Obesity (BMI 30.0-34.9)     Hyperlipidemia with target LDL less than 70     Proliferative diabetic retinopathy (H)        Past Medical History   Diagnosis Date     Benign neoplasm of colon 11/07, 2/12     adenomatous polyps - due 5 yrs     ED (erectile dysfunction)      Esophageal reflux 1995     Esophageal stricture 8/13     Esophagitis 8/13     LA Grade A     Hyperlipidemia LDL goal < 70      Hypertension goal BP (blood pressure) < 140/90 3/09     Migraine 1986     rare migraines 2/yr     Obesity (BMI 30.0-34.9)      Other premature beats 11/07     bigeminy     Proliferative diabetic retinopathy (H)      [facility] - Avastatin - Dr Rosalva Zuñiga     Type 2 diabetes, HbA1c goal < 7% (H) 1992     Dr Bustos     Unspecified gastritis and gastroduodenitis  without mention of hemorrhage 1995        Past Surgical History   Procedure Laterality Date     Hc repair ing hernia,5+y/o,reducibl  1978     rt     Colonoscopy  11/07, 2/12     polyps x 2 - internal hemorrhoids - adenomatous polyps - due 2015     Stress echo ([facility])  3/12     normal     Esophagoscopy, gastroscopy, duodenoscopy (eg[facility]), combined  8/20/2013     Esophagitis LA Grade A, esophageal stricture       Social History   Substance Use Topics     Smoking status: Never Smoker     Smokeless tobacco: Never Used     Alcohol use No       Family History   Problem Relation Age of Onset     Hypertension Mother 58     Connective Tissue Disorder Father 70     DIABETES Father 55     Breast Cancer Maternal Grandmother      Respiratory Paternal Grandfather      Colon Cancer No family hx of        Prior to Admission medications    Medication Sig Start Date End Date Taking? Authorizing Provider   Insulin Aspart (NOVOLOG SC) Inject Subcutaneous 3 times daily (with meals)   Yes Reported, Patient   glimepiride (AMARYL) 4 MG tablet TK 1/2 TO 1 T PO QPM UTD 12/15/16  Yes Reported, Patient   sertraline (ZOLOFT) 50 MG tablet Take 1 tablet (50 mg) by mouth daily 1/10/17  Yes Pepe Spear MD   losartan (COZAAR) 50 MG tablet Take 1 tablet (50 mg) by mouth daily 1/10/17  Yes Pepe Spear MD   pravastatin (PRAVACHOL) 40 MG tablet Take 1 tablet (40 mg) by mouth daily 1/10/17  Yes Pepe Spear MD   omeprazole (PRILOSEC) 40 MG capsule Take 1 capsule (40 mg) by mouth daily 1/10/17  Yes Pepe Spear MD   LEVEMIR FLEXTOUCH 100 UNIT/ML soln INJ 60 UNITS BID 9/7/16  Yes Reported, Patient   naproxen sodium (ANAPROX) 550 MG tablet TAKE 1 TABLET BY MOUTH TWICE DAILY, WITH MEALS 3/23/16  Yes Pepe Spear MD   aspirin 81 MG tablet Take 1 tablet (81 mg) by mouth daily 1/11/16  Yes Pepe Spear MD   sildenafil (VIAGRA) 100 MG cap/tab Take 0.5-1 tablets ( mg) by mouth daily as needed for erectile dysfunction 1/10/17   Pepe Spear MD   VICTOZA PEN  18 MG/3ML soln  8/21/16   Reported, Patient   blood glucose monitoring (SOFTCLIX) lancets USE WHEN TESTING BLOOD SUGARS TWICE DAILY. 5/23/16   Pepe Spear MD   ACCU-CHEK JERMAINE PLUS test strip USE TO TEST BLOOD SUGARS TWICE DAILY. 5/23/16   Pepe Spear MD       Allergies   Allergen Reactions     Metformin Hcl [Riomet]      Nausea and throat tightening     Penicillins      anaphylaxis     Thiethylperazine Maleate      Torecan Throat Swelling        REVIEW OF SYSTEMS:   5 point ROS negative except as noted above in HPI, including Gen., Resp., CV, GI &  system review.    PHYSICAL EXAM:   There were no vitals taken for this visit. Estimated body mass index is 32.82 kg/(m^2) as calculated from the following:    Height as of 1/10/17: 1.829 m (6').    Weight as of 1/10/17: 109.8 kg (242 lb).   GENERAL APPEARANCE: alert, and oriented  MENTAL STATUS: alert  AIRWAY EXAM: Mallampatti Class I (visualization of the soft palate, fauces, uvula, anterior and posterior pillars)  RESP: lungs clear to auscultation - no rales, rhonchi or wheezes  CV: regular rates and rhythm  DIAGNOSTICS:    Not indicated    IMPRESSION   ASA Class 1 - Healthy patient, no medical problems    PLAN:   Plan for Colonoscopy with possible biopsy, possible polypectomy. We discussed the risks, benefits and alternatives and the patient wished to proceed.    The above has been forwarded to the consulting provider.      Signed Electronically by: Steven Carter  February 21, 2017

## 2017-02-21 NOTE — IP AVS SNAPSHOT
MRN:[id number]                      After Visit Summary   2/21/2017    Braden De Leon    MRN: [id number]           Thank you!     Thank you for choosing [facility] for your care. Our goal is always to provide you with excellent care. Hearing back from our patients is one way we can continue to improve our services. Please take a few minutes to complete the written survey that you may receive in the mail after you visit. If you would like to speak to someone directly about your visit please contact Patient Relations at [phone]. Thank you!          Patient Information     Date Of Birth          1970        About your hospital stay     You were admitted on:  February 21, 2017 You last received care in the:  [facility]    You were discharged on:  February 21, 2017       Who to Call     For medical emergencies, please call 911.  For non-urgent questions about your medical care, please call your primary care provider or clinic, [phone]  For questions related to your surgery, please call your surgery clinic        Attending Provider     Provider Specialty    Steven Carter MD Gastroenterology       Primary Care Provider Office Phone # Fax #    Pepe Spear [phone]       [facility] [address] [place] [id number]        Further instructions from your care team         Understanding Diverticulosis and Diverticulitis     Pouches or diverticula usually occur in the lower part of the colon called the sigmoid.      Diverticulitis occurs when the pouches become inflamed.     The colon (large intestine) is the last part of the digestive tract. It absorbs water from stool and changes it from a liquid to a solid. In certain cases, small pouches called diverticula can form in the colon wall. This condition is called diverticulosis. The pouches can become infected. If this happens, it becomes a more serious problem called  diverticulitis. These problems can be painful. But they can be managed.   Managing Your Condition  Diet changes or taking medications are often tried first. These may be enough to bring relief. If the case is bad, surgery may be done. You and your doctor can discuss the plan that is best for you.  If You Have Diverticulosis  Diet changes are often enough to control symptoms. The main changes are adding fiber (roughage) and drinking more water. Fiber absorbs water as it travels through your colon. This helps your stool stay soft and move smoothly. Water helps this process. If needed, you may be told to take over-the-counter stool softeners. To help relieve pain, antispasmodic medications may be prescribed.  If You Have Diverticulitis  Treatment depends on how bad your symptoms are.  For mild symptoms: You may be put on a liquid diet for a short time. You may also be prescribed antibiotics. If these two steps relieve your symptoms, you may then be prescribed a high-fiber diet. If you still have symptoms, your doctor will discuss further treatment options with you.  For severe symptoms: You may need to be admitted to the hospital. There, you can be given IV antibiotics and fluids. Once symptoms are under control, the above treatments may be tried. If these don t control your condition, your doctor may discuss the option of having surgery with you.  [place] to Colon Health  Help keep your colon healthy with a diet that includes plenty of high-fiber fruits, vegetables, and whole grains. Drink plenty of liquids like water and juice. Your doctor may also recommend avoiding seeds and nuts.          [id number] [facility], [address] [place] [address], [place] [address]. All rights reserved. This information is not intended as a substitute for professional medical care. Always follow your healthcare professional's instructions.    Eating a High-Fiber Diet  Fiber is what gives strength and structure to plants. Most grains, beans,  vegetables, and fruits contain fiber. Foods rich in fiber are often low in calories and fat, and they fill you up more. They may also reduce your risks for certain health problems. To find out the amount of fiber in canned, packaged, or frozen foods, read the  Nutrition Facts  label. It tells you how much fiber is in a serving.      Types of Fiber and Their Benefits  There are two types of fiber: insoluble and soluble. They both aid digestion and help you maintain a healthy weight.  Insoluble fiber: This is found in whole grains, cereals, certain fruits and vegetables (such as apple skin, corn, and carrots). Insoluble fiber may prevent constipation and reduce the risk of certain types of cancer.   Soluble fiber: This type of fiber is in oats, beans, and certain fruits and vegetables (such as strawberries and peas). Soluble fiber can reduce cholesterol (which may help lower the risk of heart disease), and helps control blood sugar levels.  Look for High-Fiber Foods  Whole-grain breads and cereals: Try to eat 6-8 ounces a day. Include wheat and oat bran cereals, whole-wheat muffins or toast, and corn tortillas in your meals.  Fruits: Try to eat 2 cups a day. Apples, oranges, strawberries, pears, and bananas are good sources. (Note: Fruit juice is low in fiber.)  Vegetables: Try to eat 3 cups a day. Add asparagus, carrots, broccoli, peas, and corn to your meals.  Legumes (beans): One cup of cooked lentils gives you over 15 grams of fiber. Try navy beans, lentils, and chickpeas.  Seeds:  A small handful of seeds gives you about 3 grams of fiber. Try sunflower seeds.    Keep Track of Your Fiber  A healthy diet includes 31 grams of fiber a day if you have a 2,000-calorie diet. Keep track of how much fiber you eat. Start by reading food labels. Then eat a variety of foods high in fiber. Ask your doctor about supplemental fiber products.            [id number] [employer], [address] [place] [address], [place] [address]. All  rights reserved. This information is not intended as a substitute for professional medical care. Always follow your healthcare professional's instructions.    Pending Results     No orders found from 2/19/2017 to 2/22/2017.            Admission Information     Date & Time Provider Department Dept. Phone    2/21/2017 Steven Carter MD [facility] [phone]      Your Vitals Were     Blood Pressure Respirations Height Weight Pulse Oximetry BMI (Body Mass Index)    127/83 15 1.829 m (6') 108 kg (238 lb) 91% 32.28 kg/m2      MyChart Information     [employer] gives you secure access to your electronic health record. If you see a primary care provider, you can also send messages to your care team and make appointments. If you have questions, please call your primary care clinic.  If you do not have a primary care provider, please call [phone] and they will assist you.        Care EveryWhere ID     This is your Care EveryWhere ID. This could be used by other organizations to access your [facility] medical records  [id number]           Review of your medicines      CONTINUE these medicines which have NOT CHANGED        Dose / Directions    ACCU-CHEK JERMAINE PLUS test strip   Used for:  Type 2 diabetes mellitus without complication (H)   Generic drug:  blood glucose monitoring        USE TO TEST BLOOD SUGARS TWICE DAILY.   Quantity:  200 each   Refills:  3       aspirin 81 MG tablet   Used for:  Type 2 diabetes mellitus without complication (H)        Dose:  81 mg   Take 1 tablet (81 mg) by mouth daily   Quantity:  30 tablet   Refills:  0       blood glucose monitoring lancets   Used for:  Type 2 diabetes mellitus without complication (H)        USE WHEN TESTING BLOOD SUGARS TWICE DAILY.   Quantity:  2 Box   Refills:  3       glimepiride 4 MG tablet   Commonly known as:  AMARYL        TK 1/2 TO 1 T PO QPM UTD   Refills:  12       LEVEMIR FLEXTOUCH 100 UNIT/ML injection   Generic drug:  insulin detemir         INJ 60 UNITS BID   Refills:  6       losartan 50 MG tablet   Commonly known as:  COZAAR   Used for:  Hypertension goal BP (blood pressure) < 140/90, Type 2 diabetes mellitus with stable proliferative retinopathy, with long-term current use of insulin, unspecified laterality (H)        Dose:  50 mg   Take 1 tablet (50 mg) by mouth daily   Quantity:  90 tablet   Refills:  3       naproxen sodium 550 MG tablet   Commonly known as:  ANAPROX   Used for:  Primary osteoarthritis of foot, Primary osteoarthritis of ankle, unspecified laterality        TAKE 1 TABLET BY MOUTH TWICE DAILY, WITH MEALS   Quantity:  60 tablet   Refills:  0       NOVOLOG SC        Inject Subcutaneous 3 times daily (with meals)   Refills:  0       omeprazole 40 MG capsule   Commonly known as:  priLOSEC   Used for:  Gastroesophageal reflux disease without esophagitis        Dose:  40 mg   Take 1 capsule (40 mg) by mouth daily   Quantity:  90 capsule   Refills:  3       pravastatin 40 MG tablet   Commonly known as:  PRAVACHOL   Used for:  Hyperlipidemia with target LDL less than 70, Type 2 diabetes mellitus with stable proliferative retinopathy, with long-term current use of insulin, unspecified laterality (H), Proliferative diabetic retinopathy without macular edema associated with type 2 diabetes mellitus (H)        Dose:  40 mg   Take 1 tablet (40 mg) by mouth daily   Quantity:  90 tablet   Refills:  3       sertraline 50 MG tablet   Commonly known as:  ZOLOFT   Used for:  Mood changes (H)        Dose:  50 mg   Take 1 tablet (50 mg) by mouth daily   Quantity:  90 tablet   Refills:  3       sildenafil 100 MG cap/tab   Commonly known as:  VIAGRA   Used for:  Erectile dysfunction, unspecified erectile dysfunction type        Dose:   mg   Take 0.5-1 tablets ( mg) by mouth daily as needed for erectile dysfunction   Quantity:  6 tablet   Refills:  3       VICTOZA PEN 18 MG/3ML soln   Generic drug:  liraglutide        Refills:  12                 Protect others around you: Learn how to safely use, store and throw away your medicines at www.disposemymeds.org.             Medication List: This is a list of all your medications and when to take them. Check marks below indicate your daily home schedule. Keep this list as a reference.      Medications           Morning Afternoon Evening Bedtime As Needed    ACCU-CHEK JERMAINE PLUS test strip   USE TO TEST BLOOD SUGARS TWICE DAILY.   Generic drug:  blood glucose monitoring                                aspirin 81 MG tablet   Take 1 tablet (81 mg) by mouth daily                                blood glucose monitoring lancets   USE WHEN TESTING BLOOD SUGARS TWICE DAILY.                                glimepiride 4 MG tablet   Commonly known as:  AMARYL   TK 1/2 TO 1 T PO QPM UTD                                LEVEMIR FLEXTOUCH 100 UNIT/ML injection   INJ 60 UNITS BID   Generic drug:  insulin detemir                                losartan 50 MG tablet   Commonly known as:  COZAAR   Take 1 tablet (50 mg) by mouth daily                                naproxen sodium 550 MG tablet   Commonly known as:  ANAPROX   TAKE 1 TABLET BY MOUTH TWICE DAILY, WITH MEALS                                NOVOLOG SC   Inject Subcutaneous 3 times daily (with meals)                                omeprazole 40 MG capsule   Commonly known as:  priLOSEC   Take 1 capsule (40 mg) by mouth daily                                pravastatin 40 MG tablet   Commonly known as:  PRAVACHOL   Take 1 tablet (40 mg) by mouth daily                                sertraline 50 MG tablet   Commonly known as:  ZOLOFT   Take 1 tablet (50 mg) by mouth daily                                sildenafil 100 MG cap/tab   Commonly known as:  VIAGRA   Take 0.5-1 tablets ( mg) by mouth daily as needed for erectile dysfunction                                VICTOZA PEN 18 MG/3ML soln   Generic drug:  liraglutide

## 2017-02-21 NOTE — DISCHARGE INSTRUCTIONS

## 2017-02-21 NOTE — LETTER
January 26, 2017      Braden De Leon  [address] [place] [address]              Dear Braden De Leon,    Thank you for choosing [facility]. You are scheduled for the following service(s).   Miralax Prep    Procedure:   Colonoscopy   Provider:         Dr. Carter  Date:    Tuesday February 21, 2017                Arrival Time:   0900  *check in at Emergency/Endoscopy desk*  Procedure Time:  0930     Location:   [facility]      Endoscopy Department, First Floor (Enter through ER Doors) *       [address] [place], [place] [address]    [phone] or [phone] () to reschedule   What is a colonoscopy?  A colonoscopy is the most accurate test to detect colon polyps and colon cancer, and the only test where polyps can be removed. During this procedure, a doctor examines the lining of your large intestine and rectum through a flexible tube called a colonoscope.   To produce the best and most accurate results, your colon must be completely clean. You will drink a special bowel cleansing preparation to help clean out your colon. You will also need to follow a special diet several days prior to your scheduled colonoscopy.  What happens during a colonoscopy?  Plan to spend up to two hours, starting at registration time, at the endoscopy center the day of your procedure. The exam itself takes approximately 15 minutes to complete, and recovery is approximately 30 minutes.     Before the exam:    You will change into a gown.    Your medical history will be reviewed with you and you will be given a consent form to sign.     A nurse will insert an intravenous (IV) line into your hand or arm.  During the exam:     Medicine will be given through the IV line to help you relax and feel drowsy.     Your heart rate and oxygen levels will be monitored. If your blood pressure is low, you may be given fluids through the IV line.     The doctor will insert a  flexible hollow tube, called a colonoscope, into your rectum.   The scope will be advanced slowly through the large intestine (colon).    You may have a feeling of pressure or fullness.     If an abnormal tissue, or a polyp are found, the doctor may remove it through the endoscope for closer examination, or biopsy. Tissue removal is painless.  What happens after the exam?           The doctor will talk with you about the initial results of your exam.     The doctor will prepare a full report for the physician who referred you for the colonoscopy.     You may feel bloated after the procedure. This is normal.     Medication given during the exam will prohibit you from driving for the rest of the day.     Following the exam, you may resume your normal diet. Avoid alcohol until the next day.     You may resume your regular activities the day after the procedure.     A nurse will provide you with complete discharge instructions before you leave the endoscopy center. Be sure to ask the nurse for specific instructions if you take blood thinners such as aspirin, Coumadin or Plavix.     Any tissue samples removed during the exam will be sent to a lab for evaluation. It may take 5-7 working days for you to be notified of the results.     Miralax-Gatorade  If you have diabetes, ask your regular doctor for diet and medication restrictions.   If you take a medication to thin your blood, such as coumadin or warfarin, please call your primary care provider for directions on when to stop this medication.  If you take aspirin, you may continue to do so.  If you are or may be pregnant, please discuss the risks and benefits of this procedure with your doctor.  You must arrange for a ride for the day of your exam. If you fail to arrange transportation with a responsible adult (someone you know and trust) your procedure will need to be cancelled and rescheduled.  If you must cancel or reschedule your appointment, please call  [phone] as soon as possible.        PREPARATION  To ensure a successful exam, please follow all instructions carefully. Failure to accurately and completely prepare for your exam may result in the need for an additional procedure and both procedures will be billed to your insurance.     Purchase the following over-the-counter supplies at your local pharmacy:      ? 2 tablets bisacodyl, each containing 5 mg of bisacodyl (Dulcolax  laxative NOT Dulcolax  stool softener)   ? 1-8.3 oz. bottle Miralax  powder (238 grams)   ? 64 oz. Gatorade  liquid (NOT red; NOT powdered). Regular Gatorade , Gatorade G2 , Powerade  or  PoweradeZero  are acceptable.   ? 1-10 oz. bottle Magnesium Citrate (NOT red)  7 days before your exam:  Discontinue fiber supplements or medications containing iron. This includes multivitamins with iron, Metamucil  and Fibercon .    3 Days prior to your exam:  Stop eating all high-fiber foods and begin a Low-Fiber Diet. A low fiber diet helps make the cleanout more effective.     Examples of a Low Fiber Diet include:     White bread, white rice, pasta, potato without skin, plain crackers     Fish, white meat chicken, eggs, peanut butter without nuts     Clear beverages (apple juice, white grape juice, Sprite , sparkling water, Gatorade )     Cooked carrots, cooked green beans, cooked spinach        Milk, plain yogurt, cheese     Jelly, salt, pepper, sugar  Avoid: Raw fruits or vegetables, whole wheat or high fiber foods, seeds, nuts, popcorn, bran or bulking agents     2 days prior to your exam     Continue Low Fiber Diet.     Drink at least 8 (eight ounces) glasses of water throughout the day.     Refrigerate the Gatorade  or Powerade , if you wish to drink it cold.     Stop eating solid foods at 11:45 pm.    1 day prior to your exam  Start a Clear Liquid Diet   Examples of a Clear Liquid Diet:     No red liquids; No coffee; No alcohol; No dairy products     May drink clear caffeinated  beverages    Water: drink at least 8 glasses of water during the day     Tea (do not add milk or creamer)     Clear broth or bouillon     Gatorade , Pedialyte  or Powerade  (No red)     Carbonated and non-carbonated soft drinks (Sprite , 7-Up , Ginger ale)     Strained fruit juices without pulp (apple, white grape, white cranberry)     Jell-O , popsicles, hard candy (No red)    At 12 Noon:  Take the 2 bisacodyl (Dulcolax ) tablets    At 4 PM (no later than 6 PM)    Mix 1 bottle of Miralax  (8.3 oz) with 64 oz. of Gatorade  in a large pitcher.     Drink 1 - 8 oz. glass of the Miralax /Gatorade  solution.     Continue drinking 1 - 8 oz. glass every 15 minutes thereafter until the mixture is gone.     Continue clear liquid diet     Colon Cleansing Tips     If you experience nausea or vomiting while taking the prep, rinse your mouth with water,  or mouthwash , take a 15-30 minute break, and then continue taking the prep solution. If you are still unable to complete the prep, without severe nausea or vomiting, you will need to contact your primary care provider (the provider who ordered the test) for a possible anti-nausea medication. Or if you are prone to nausea you may want to ask your primary care provider to prescribe an as needed anti-nausea medication that you may have on hand.    Chill the Miralax /Gatorade  solution in the refrigerator. DO NOT add ice to the solution or your drinking glass.      Set a timer for every 15 minutes. Drink each 8 - oz. glass of solution quickly to help flush your colon.     Stay near a toilet! You will have diarrhea.     Even if you are sitting on the toilet, continue to drink the cleansing solution every 15 minutes.     Drink all of the solution until it is gone.     You will be uncomfortable until the stool has flushed from your colon (in about 2-4 hours). You may feel chilled.     You may suck on a few hard candies (NO red).     Alcohol-free baby wipes or Vaseline  may help ease  skin irritation.     Over-the-counter hydrocortisone creams, hemorrhoid treatments or Tucks may be used if desired.    The day of your exam:            Continue clear liquid diet. Do not eat solid foods.     You may take all of your morning medications.    4 hours before your procedure:     Drink 10 oz. of Magnesium Citrate.    2 hours before your procedure:     Stop drinking clear liquids.     Allow extra time to travel to your procedure as you may need to stop and use a restroom along the way.  You are ready for the exam, if you followed all instructions and your stool is no longer formed, but clear or yellow liquid. If you are unsure whether your colon is clean, please call our department at [phone] before you leave for your appointment.      Bring a list of all of your current medications, including any allergy or over-the-counter medications.      Bring a photo ID, as well as up-to-date insurance information, such as your insurance card and any referral forms that might be required by your insurance company.  DIRECTIONS  From the north ([facility] [place])  Take [address] south, exit to [address] [place] [address]. Get into the left hand lissy, turn left (east), go one-half mile to [address]. Turn left (north) on [address]. Go north to first stoplight, take a right on the newly constructed [address] and follow it to the Emergency entrance.  From the south ([facility])  Take 35 north to the east split, [address], and exit to [address] [place] [address]. Turn left (west) on [address] [address] to [address]. Turn right (north) on [address]. Go north to first stoplight, take a right on the newly [address] and follow it to the Emergency entrance.    From the east via 35[address] ([facility])  Take [address] south to [address] [place] [address] exit. Turn right on [address] [address]. Go west to [address]. Turn right (north) on [address], go to the first stoplight, take a right and  follow the newly constructed road, [address], to the Emergency entrance.    From the east via [address] ([facility])  Take [address] west to [address]. Turn left (south) on [address] to [address]. Turn left (east) on [address] and follow the newly constructed road to the Emergency entrance.    From the west via [address] ([place])  Take [address] east to [address]. Turn right (south) on [address] to [address].  Turn left (east) on [address] and follow the newly constructed road to the Emergency entrance.  Cut along line  -------------------------------------------------------------------------------------------------------------------  SHOPPING LIST FOR OVER-THE-COUNTER COLONOSCOPY PREP:  ? 2 tablets bisacodyl, each containing 5 mg of bisacodyl (Dulcolax  laxative                     NOT Dulcolax  stool softener)   ? 1-8.3 oz. bottle Miralax  powder (238 grams)   ? 64 oz. Gatorade  liquid (NOT red; NOT powdered). Regular Gatorade ,                     Gatorade G2 , Powerade  or  PoweradeZero  are acceptable.   ? 1-10 oz. bottle Magnesium Citrate (NOT red)

## 2017-02-21 NOTE — OR NURSING
Pt conversing with md during procedure, RN applied light abdominal pressure to assist md in reaching cecum per md's request.  Encouraging deep breathing throughout procedure.

## 2017-02-21 NOTE — OR NURSING
Late entry-  md  aware of high blood sugar prior to procedure-  Md states ok to continue- [email or web address]

## 2017-04-11 ENCOUNTER — TRANSFERRED RECORDS (OUTPATIENT)
Dept: HEALTH INFORMATION MANAGEMENT | Facility: CLINIC | Age: 47
End: 2017-04-11

## 2017-04-11 LAB
ALT SERPL-CCNC: 27 U/L (ref 9–46)
CREAT SERPL-MCNC: 0.89 MG/DL (ref 0.6–1.35)
GFR SERPL CREATININE-BSD FRML MDRD: 100 ML/MIN/1.73M2
GLUCOSE SERPL-MCNC: 80 MG/DL (ref 65–99)
HBA1C MFR BLD: 7.4 % (ref 4–6)
POTASSIUM SERPL-SCNC: 4 MMOL/L (ref 3.5–5.3)

## 2017-04-23 ENCOUNTER — TRANSFERRED RECORDS (OUTPATIENT)
Dept: HEALTH INFORMATION MANAGEMENT | Facility: CLINIC | Age: 47
End: 2017-04-23

## 2017-05-23 ENCOUNTER — RADIANT APPOINTMENT (OUTPATIENT)
Dept: GENERAL RADIOLOGY | Facility: CLINIC | Age: 47
End: 2017-05-23
Attending: FAMILY MEDICINE
Payer: COMMERCIAL

## 2017-05-23 ENCOUNTER — OFFICE VISIT (OUTPATIENT)
Dept: FAMILY MEDICINE | Facility: CLINIC | Age: 47
End: 2017-05-23
Payer: COMMERCIAL

## 2017-05-23 VITALS
DIASTOLIC BLOOD PRESSURE: 84 MMHG | TEMPERATURE: 98.3 F | BODY MASS INDEX: 34.67 KG/M2 | OXYGEN SATURATION: 96 % | SYSTOLIC BLOOD PRESSURE: 120 MMHG | HEIGHT: 72 IN | HEART RATE: 95 BPM | WEIGHT: 256 LBS

## 2017-05-23 DIAGNOSIS — S92.411A CLOSED DISPLACED FRACTURE OF PROXIMAL PHALANX OF RIGHT GREAT TOE, INITIAL ENCOUNTER: Primary | ICD-10-CM

## 2017-05-23 DIAGNOSIS — S99.929A FOOT INJURY: ICD-10-CM

## 2017-05-23 DIAGNOSIS — L03.90 CELLULITIS, UNSPECIFIED CELLULITIS SITE: ICD-10-CM

## 2017-05-23 DIAGNOSIS — E11.3599 PROLIFERATIVE DIABETIC RETINOPATHY ASSOCIATED WITH TYPE 2 DIABETES MELLITUS, MACULAR EDEMA PRESENCE UNSPECIFIED: ICD-10-CM

## 2017-05-23 PROCEDURE — 73630 X-RAY EXAM OF FOOT: CPT | Mod: RT

## 2017-05-23 PROCEDURE — 28490 TREAT BIG TOE FRACTURE: CPT | Performed by: FAMILY MEDICINE

## 2017-05-23 RX ORDER — AZITHROMYCIN 250 MG/1
TABLET, FILM COATED ORAL
Qty: 6 TABLET | Refills: 0 | Status: SHIPPED | OUTPATIENT
Start: 2017-05-23 | End: 2017-06-13

## 2017-05-23 NOTE — MR AVS SNAPSHOT
After Visit Summary   5/23/2017    Braden De Leon    MRN: [id number]           Patient Information     Date Of Birth          1970        Visit Information        Provider Department      5/23/2017 11:20 AM Juan José Rangel MD [facility]        Today's Diagnoses     Closed displaced fracture of proximal phalanx of right great toe, initial encounter    -  1    Cellulitis, unspecified cellulitis site        Proliferative diabetic retinopathy associated with type 2 diabetes mellitus, macular edema presence unspecified (H)           Follow-ups after your visit        Who to contact     If you have questions or need follow up information about today's clinic visit or your schedule please contact [facility] directly at [phone].  Normal or non-critical lab and imaging results will be communicated to you by MyChart, letter or phone within 4 business days after the clinic has received the results. If you do not hear from us within 7 days, please contact the clinic through [employer] or phone. If you have a critical or abnormal lab result, we will notify you by phone as soon as possible.  Submit refill requests through [email or web address] or call your pharmacy and they will forward the refill request to us. Please allow 3 business days for your refill to be completed.          Additional Information About Your Visit        MyChart Information     [email or web address] gives you secure access to your electronic health record. If you see a primary care provider, you can also send messages to your care team and make appointments. If you have questions, please call your primary care clinic.  If you do not have a primary care provider, please call [phone] and they will assist you.        Care EveryWhere ID     This is your Care EveryWhere ID. This could be used by other organizations to access your [facility] medical records  [id number]        Your Vitals Were     Pulse Temperature Height Pulse  Oximetry BMI (Body Mass Index)       95 98.3  F (36.8  C) (Oral) 6' (1.829 m) 96% 34.72 kg/m2        Blood Pressure from Last 3 Encounters:   05/23/17 120/84   02/21/17 (!) 136/96   01/10/17 136/86    Weight from Last 3 Encounters:   05/23/17 256 lb (116.1 kg)   02/21/17 238 lb (108 kg)   01/10/17 242 lb (109.8 kg)              We Performed the Following     CLOSED TX GREAT TOE FX W/O MANIP     POST OP SHOE DELUXE MALE L          Today's Medication Changes          These changes are accurate as of: 5/23/17 10:37 PM.  If you have any questions, ask your nurse or doctor.               Start taking these medicines.        Dose/Directions    azithromycin 250 MG tablet   Commonly known as:  ZITHROMAX   Used for:  Cellulitis, unspecified cellulitis site   Started by:  Juan José Rangel MD        Two tablets first day, then one tablet daily for four days   Quantity:  6 tablet   Refills:  0            Where to get your medicines      Some of these will need a paper prescription and others can be bought over the counter.  Ask your nurse if you have questions.     Bring a paper prescription for each of these medications     azithromycin 250 MG tablet                Primary Care Provider Office Phone # Fax #    Pepe Spear [phone]       [address] [address]        Thank you!     Thank you for choosing [facility]  for your care. Our goal is always to provide you with excellent care. Hearing back from our patients is one way we can continue to improve our services. Please take a few minutes to complete the written survey that you may receive in the mail after your visit with us. Thank you!             Your Updated Medication List - Protect others around you: Learn how to safely use, store and throw away your medicines at www.disposemymeds.org.          This list is accurate as of: 5/23/17 10:37 PM.  Always use your most recent med list.                    Brand Name Dispense Instructions for use    ACCU-CHEK JERMAINE PLUS test strip   Generic drug:  blood glucose monitoring     200 each    USE TO TEST BLOOD SUGARS TWICE DAILY.       aspirin 81 MG tablet     30 tablet    Take 1 tablet (81 mg) by mouth daily       azithromycin 250 MG tablet    ZITHROMAX    6 tablet    Two tablets first day, then one tablet daily for four days       blood glucose monitoring lancets     2 Box    USE WHEN TESTING BLOOD SUGARS TWICE DAILY.       glimepiride 4 MG tablet    AMARYL     TK 1/2 TO 1 T PO QPM UTD       LEVEMIR FLEXTOUCH 100 UNIT/ML injection   Generic drug:  insulin detemir      INJ 60 UNITS BID       losartan 50 MG tablet    COZAAR    90 tablet    Take 1 tablet (50 mg) by mouth daily       naproxen sodium 550 MG tablet    ANAPROX    60 tablet    TAKE 1 TABLET BY MOUTH TWICE DAILY, WITH MEALS       NOVOLOG SC      Inject Subcutaneous 3 times daily (with meals)       omeprazole 40 MG capsule    priLOSEC    90 capsule    Take 1 capsule (40 mg) by mouth daily       pravastatin 40 MG tablet    PRAVACHOL    90 tablet    Take 1 tablet (40 mg) by mouth daily       sertraline 50 MG tablet    ZOLOFT    90 tablet    Take 1 tablet (50 mg) by mouth daily       sildenafil 100 MG cap/tab    VIAGRA    6 tablet    Take 0.5-1 tablets ( mg) by mouth daily as needed for erectile dysfunction       VICTOZA PEN 18 MG/3ML soln   Generic drug:  liraglutide

## 2017-05-23 NOTE — NURSING NOTE
Chief Complaint   Patient presents with     Musculoskeletal Problem       Initial /84 (BP Location: Left arm, Patient Position: Chair, Cuff Size: Adult Large)  Pulse 95  Temp 98.3  F (36.8  C) (Oral)  Ht 6' (1.829 m)  Wt 256 lb (116.1 kg)  SpO2 96%  BMI 34.72 kg/m2 Estimated body mass index is 34.72 kg/(m^2) as calculated from the following:    Height as of this encounter: 6' (1.829 m).    Weight as of this encounter: 256 lb (116.1 kg).  Medication Reconciliation: complete

## 2017-05-23 NOTE — Clinical Note
Can you give an opinion on his toe fracture management - he has minimal pain -  I have him in a stiff soled postop shoe.  He used to see Dr Adams

## 2017-05-23 NOTE — PROGRESS NOTES
SUBJECTIVE:                                                    Braden De Leon is a 47 year old male who presents to clinic today for the following health issues:    Foot pain     Onset: 5 days, tripping down the stairs (hperflexed the first toe)    Description:   Right foot    Intensity: no pain    Progression of Symptoms:  same    Accompanying Signs & Symptoms:  Swelling and purple- no pain- red warm to the touch    The patient has been running on his foot without pain.     Previous history of similar problem:   Pt missed a step and foot got caught up    Precipitating factors:   Worsened by: a lot of walking    Alleviating factors:  Improved by: elevation       Therapies Tried and outcome: 1000 mg tylenol 400 mg ibuprofen 1 time day      Problem list and histories reviewed & adjusted, as indicated.  Additional history: as documented      ROS:  Constitutional, HEENT, cardiovascular, pulmonary, GI, , musculoskeletal, neuro, skin, endocrine and psych systems are negative, except as otherwise noted.    This document serves as a record of the services and decisions personally performed and made by Juan José Rangel MD. It was created on his behalf by Sandra Ludwig, a trained medical scribe. The creation of this document is based on the provider's statements to the medical scribe.  Sandra Ludwig 11:53 AM 5/23/2017  OBJECTIVE:                                                    /84 (BP Location: Left arm, Patient Position: Chair, Cuff Size: Adult Large)  Pulse 95  Temp 98.3  F (36.8  C) (Oral)  Ht 1.829 m (6')  Wt 116.1 kg (256 lb)  SpO2 96%  BMI 34.72 kg/m2 Body mass index is 34.72 kg/(m^2).   GENERAL: healthy, alert, well nourished, well hydrated, no distress  MS: right first toe swelling and accompanying erythema extending to the proximal arch, associated edema, extremities- no gross deformities noted, no edema  SKIN: 3x blisters with associated serous drainage, otherwise no suspicious lesions, no  lisa    Diagnostic test results:  Xray (Right first toe) - S-H 3 fracture with 2 fractures noted medially and laterally displaced.     ASSESSMENT/PLAN:         Braden was seen today for musculoskeletal problem.    Diagnoses and all orders for this visit:    Closed displaced fracture of proximal phalanx of right great toe, initial encounter - Start wearing post op shoe and elevate foot when possible. Possible podiatry referral.  -     XR Foot Right G/E 3 Views; Future  -     CLOSED TX GREAT TOE FX W/O MANIP  -     POST OP SHOE DELUXE MALE L    Cellulitis, unspecified cellulitis site - Start taking azithromycin   -     azithromycin (ZITHROMAX) 250 MG tablet; Two tablets first day, then one tablet daily for four days    Proliferative diabetic retinopathy (H) - will f/u with opthamology    Risks, benefits and alternatives of treatments discussed. Plan agreed on.      Followup: 3-4 weeks    Will call, return to clinic, or go to ED if worsening or symptoms not improving as discussed.    See patient instructions.       Health Maintenance Topics with due status: Overdue       Topic Date Due    PNEUMOVAX 1X HI RISK PATIENT < 65 (NO IB MSG) 03/18/1970    FIT Q1 YR (NO INBASKET) 01/08/2014    EYE EXAM Q1 YEAR( NO INBASKET) 11/10/2016    FOOT EXAM Q1 YEAR( NO INBASKET) 02/16/2017       Health maintenance reviewed/updated? Yes    The information in this document, created by a scribe for me, accurately reflects the services I personally performed and the decisions made by me. I have reviewed and approved this document for accuracy.      Az Rangel MD

## 2017-06-02 DIAGNOSIS — E11.9 TYPE 2 DIABETES MELLITUS WITHOUT COMPLICATION (H): ICD-10-CM

## 2017-06-05 RX ORDER — BLOOD SUGAR DIAGNOSTIC
STRIP MISCELLANEOUS
Qty: 200 STRIP | Refills: 5 | Status: SHIPPED | OUTPATIENT
Start: 2017-06-05 | End: 2018-01-30

## 2017-06-05 NOTE — TELEPHONE ENCOUNTER
ACCU-CHEK JERMAINE PLUS test strip         Last Written Prescription Date: 05/23/2016  Last Fill Quantity: 200 each, # refills: 3  Last Office Visit with [facility], [facility] or [facility] prescribing provider:  05/23/2017        BP Readings from Last 3 Encounters:   05/23/17 120/84   02/21/17 (!) 136/96   01/10/17 136/86     Lab Results   Component Value Date    MICROL 720 01/10/2017     Lab Results   Component Value Date    UMALCR 293.88 01/10/2017     Creatinine   Date Value Ref Range Status   04/11/2017 0.89 0.60 - 1.35 mg/dL Final   ]  GFR Estimate   Date Value Ref Range Status   04/11/2017 100 >60 ml/min/1.73m2 Final   01/10/2017 >90  Non  GFR Calc   >60 mL/min/1.7m2 Final   11/15/2016 106 >60 ml/min/1.73m2 Final     GFR Estimate If Black   Date Value Ref Range Status   04/11/2017 116 >60 ml/min/1.73m2 Final   01/10/2017 >90   GFR Calc   >60 mL/min/1.7m2 Final   11/15/2016 123 >60 ml/min/1.73m2 Final     Lab Results   Component Value Date    CHOL 136 01/10/2017     Lab Results   Component Value Date    HDL 41 01/10/2017     Lab Results   Component Value Date    LDL 66 01/10/2017     Lab Results   Component Value Date    TRIG 147 01/10/2017     Lab Results   Component Value Date    CHOLHDLRATIO 3.8 05/12/2015     Lab Results   Component Value Date    AST 26 01/10/2017     Lab Results   Component Value Date    ALT 27 04/11/2017     Lab Results   Component Value Date    A1C 7.4 04/11/2017    A1C 10.0 01/10/2017    A1C 10.5 11/15/2016    A1C 9.6 07/26/2016    A1C 11.8 02/16/2016     Potassium   Date Value Ref Range Status   04/11/2017 4.0 3.5 - 5.3 mmol/L Final

## 2017-06-05 NOTE — TELEPHONE ENCOUNTER
Prescription approved per [facility] [place] Protocol.    Linda Hu, LADARIUS, RN, PHN  [facility]

## 2017-06-09 NOTE — PROGRESS NOTES
SUBJECTIVE:                                                    Braden De Leon is a 47 year old male who presents to clinic today for the following health issues:    Follow up fracture right foot - The patient injured his right foot on 5/18/2017 while tripping down the stairs. The patient had a comminuted moderately displaced fracture involving the right first proximal phalangeal head and interphalangeal joint. There was also slight dorsal angulation and foreshortening involving the . He has been wearing a post-op shoe which has been causing him calve pain.       Problem list and histories reviewed & adjusted, as indicated.  Additional history: as documented      ROS:  Constitutional, HEENT, cardiovascular, pulmonary, GI, , musculoskeletal, neuro, skin, endocrine and psych systems are negative, except as otherwise noted.    This document serves as a record of the services and decisions personally performed and made by Juan José Rangel MD. It was created on his behalf by Sandra Ludwig, a trained medical scribe. The creation of this document is based on the provider's statements to the medical scribe.  Sandra Ludwig 10:06 AM 6/13/2017  OBJECTIVE:                                                    BP (!) 142/92 (BP Location: Right arm, Patient Position: Chair, Cuff Size: Adult Large)  Pulse 96  Temp 97.6  F (36.4  C) (Oral)  Ht 1.829 m (6')  Wt 115.8 kg (255 lb 4.8 oz)  SpO2 96%  BMI 34.62 kg/m2 Body mass index is 34.62 kg/(m^2).   GENERAL: healthy, alert, well nourished, well hydrated, no distress  HENT: ear canals- normal; TMs- normal; Nose- normal; Mouth- no ulcers, no lesions  NECK: no tenderness, no adenopathy, no asymmetry, no masses, no stiffness; thyroid- normal to palpation  RESP: lungs clear to auscultation - no rales, no rhonchi, no wheezes  CV: regular rates and rhythm, normal S1 S2, no S3 or S4 and no murmur, no click or rub -  ABDOMEN: soft, no tenderness, no  hepatosplenomegaly, no masses,  normal bowel sounds  MS: Right First Toe: Swelling, mild erythema, mild pain proximally, otherwise extremities- no gross deformities noted, no edema  SKIN: no suspicious lesions, no rashes    Diagnostic test results:  Xray (Right Foot) - S-H 3 fracture with 2 fractures noted medially and laterally still displaced, healing - early callous formation.     ASSESSMENT/PLAN:       Braden was seen today for recheck.    Diagnoses and all orders for this visit:    Closed nondisplaced fracture of proximal phalanx of right great toe, initial encounter -pain is minimal -  continue using postop shoe. Followup in three weeks.  -     XR Toe Right G/E 2 Views; Future    Hypertension goal BP (blood pressure) < 140/90 - uncontrolled. Increase dose of losartan to 100 mg daily. Followup in one week for blood pressure recheck.  -     losartan (COZAAR) 100 MG tablet; Take 1 tablet (100 mg) by mouth daily    Type 2 diabetes mellitus with stable proliferative retinopathy, with long-term current use of insulin, unspecified laterality (H) - Increase dose of losartan to 100 mg daily. Followup in one week for blood pressure recheck.  -     losartan (COZAAR) 100 MG tablet; Take 1 tablet (100 mg) by mouth daily        Risks, benefits and alternatives of treatments discussed. Plan agreed on.      Followup: 1 week    Will call, return to clinic, or go to ED if worsening or symptoms not improving as discussed.    See patient instructions.       Health Maintenance Topics with due status: Overdue       Topic Date Due    PNEUMOVAX 1X HI RISK PATIENT < 65 (NO IB MSG) 03/18/1970    EYE EXAM Q1 YEAR 11/10/2016    FOOT EXAM Q1 YEAR 02/16/2017       Health maintenance reviewed/updated? Yes    The information in this document, created by a scribe for me, accurately reflects the services I personally performed and the decisions made by me. I have reviewed and approved this document for accuracy.      Az Rangel MD

## 2017-06-13 ENCOUNTER — OFFICE VISIT (OUTPATIENT)
Dept: FAMILY MEDICINE | Facility: CLINIC | Age: 47
End: 2017-06-13
Payer: COMMERCIAL

## 2017-06-13 ENCOUNTER — RADIANT APPOINTMENT (OUTPATIENT)
Dept: GENERAL RADIOLOGY | Facility: CLINIC | Age: 47
End: 2017-06-13
Attending: FAMILY MEDICINE
Payer: COMMERCIAL

## 2017-06-13 VITALS
DIASTOLIC BLOOD PRESSURE: 85 MMHG | OXYGEN SATURATION: 96 % | BODY MASS INDEX: 34.58 KG/M2 | HEART RATE: 96 BPM | HEIGHT: 72 IN | WEIGHT: 255.3 LBS | SYSTOLIC BLOOD PRESSURE: 150 MMHG | TEMPERATURE: 97.6 F

## 2017-06-13 DIAGNOSIS — Z79.4 TYPE 2 DIABETES MELLITUS WITH STABLE PROLIFERATIVE RETINOPATHY, WITH LONG-TERM CURRENT USE OF INSULIN, UNSPECIFIED LATERALITY (H): ICD-10-CM

## 2017-06-13 DIAGNOSIS — S92.414A CLOSED NONDISPLACED FRACTURE OF PROXIMAL PHALANX OF RIGHT GREAT TOE, INITIAL ENCOUNTER: Primary | ICD-10-CM

## 2017-06-13 DIAGNOSIS — I10 HYPERTENSION GOAL BP (BLOOD PRESSURE) < 140/90: ICD-10-CM

## 2017-06-13 DIAGNOSIS — S92.414A CLOSED NONDISPLACED FRACTURE OF PROXIMAL PHALANX OF RIGHT GREAT TOE, INITIAL ENCOUNTER: ICD-10-CM

## 2017-06-13 DIAGNOSIS — E11.3559 TYPE 2 DIABETES MELLITUS WITH STABLE PROLIFERATIVE RETINOPATHY, WITH LONG-TERM CURRENT USE OF INSULIN, UNSPECIFIED LATERALITY (H): ICD-10-CM

## 2017-06-13 PROCEDURE — 99214 OFFICE O/P EST MOD 30 MIN: CPT | Mod: 24 | Performed by: FAMILY MEDICINE

## 2017-06-13 PROCEDURE — 73660 X-RAY EXAM OF TOE(S): CPT | Mod: RT

## 2017-06-13 RX ORDER — LOSARTAN POTASSIUM 100 MG/1
100 TABLET ORAL DAILY
Qty: 90 TABLET | Refills: 1 | Status: SHIPPED | OUTPATIENT
Start: 2017-06-13 | End: 2017-06-13

## 2017-06-13 RX ORDER — LOSARTAN POTASSIUM 100 MG/1
100 TABLET ORAL DAILY
Qty: 90 TABLET | Refills: 1 | Status: SHIPPED | OUTPATIENT
Start: 2017-06-13 | End: 2017-11-27

## 2017-06-13 ASSESSMENT — PAIN SCALES - GENERAL: PAINLEVEL: NO PAIN (0)

## 2017-06-13 NOTE — NURSING NOTE
Chief Complaint   Patient presents with     RECHECK       Initial BP (!) 142/92 (BP Location: Right arm, Patient Position: Chair, Cuff Size: Adult Large)  Pulse 96  Temp 97.6  F (36.4  C) (Oral)  Ht 6' (1.829 m)  Wt 255 lb 4.8 oz (115.8 kg)  SpO2 96%  BMI 34.62 kg/m2 Estimated body mass index is 34.62 kg/(m^2) as calculated from the following:    Height as of this encounter: 6' (1.829 m).    Weight as of this encounter: 255 lb 4.8 oz (115.8 kg).  Medication Reconciliation: complete     Ruby Valladares MA

## 2017-06-13 NOTE — MR AVS SNAPSHOT
After Visit Summary   6/13/2017    Braden De Leon    MRN: [id number]           Patient Information     Date Of Birth          1970        Visit Information        Provider Department      6/13/2017 10:00 AM Juan José Rangel MD [facility]        Today's Diagnoses     Closed nondisplaced fracture of proximal phalanx of right great toe, initial encounter    -  1    Hypertension goal BP (blood pressure) < 140/90        Type 2 diabetes mellitus with stable proliferative retinopathy, with long-term current use of insulin, unspecified laterality (H)           Follow-ups after your visit        Your next 10 appointments already scheduled     Jun 27, 2017 10:00 AM CDT   Office Visit with Juan José Rangel MD   [facility]    [address] [place] [address] [phone]           Bring a current list of meds and any records pertaining to this visit.  For Physicals, please bring immunization records and any forms needing to be filled out.  Please arrive 10 minutes early to complete paperwork.              Who to contact     If you have questions or need follow up information about today's clinic visit or your schedule please contact [facility] directly at [phone].  Normal or non-critical lab and imaging results will be communicated to you by MyChart, letter or phone within 4 business days after the clinic has received the results. If you do not hear from us within 7 days, please contact the clinic through Neterohart or phone. If you have a critical or abnormal lab result, we will notify you by phone as soon as possible.  Submit refill requests through GoNabit or call your pharmacy and they will forward the refill request to us. Please allow 3 business days for your refill to be completed.          Additional Information About Your Visit        [facility] Information     GoNabit gives you secure access to  your electronic health record. If you see a primary care provider, you can also send messages to your care team and make appointments. If you have questions, please call your primary care clinic.  If you do not have a primary care provider, please call [phone] and they will assist you.        Care EveryWhere ID     This is your Care EveryWhere ID. This could be used by other organizations to access your [facility] medical records  [id number]        Your Vitals Were     Pulse Temperature Height Pulse Oximetry BMI (Body Mass Index)       96 97.6  F (36.4  C) (Oral) 6' (1.829 m) 96% 34.62 kg/m2        Blood Pressure from Last 3 Encounters:   06/13/17 150/85   05/23/17 120/84   02/21/17 (!) 136/96    Weight from Last 3 Encounters:   06/13/17 255 lb 4.8 oz (115.8 kg)   05/23/17 256 lb (116.1 kg)   02/21/17 238 lb (108 kg)                 Today's Medication Changes          These changes are accurate as of: 6/13/17 11:40 AM.  If you have any questions, ask your nurse or doctor.               Start taking these medicines.        Dose/Directions    losartan 100 MG tablet   Commonly known as:  COZAAR   Used for:  Hypertension goal BP (blood pressure) < 140/90, Type 2 diabetes mellitus with stable proliferative retinopathy, with long-term current use of insulin, unspecified laterality (H)   Started by:  Juan José Rangel MD        Dose:  100 mg   Take 1 tablet (100 mg) by mouth daily   Quantity:  90 tablet   Refills:  1         Stop taking these medicines if you haven't already. Please contact your care team if you have questions.     azithromycin 250 MG tablet   Commonly known as:  ZITHROMAX   Stopped by:  Juan José Rangel MD                Where to get your medicines      These medications were sent to [facility] [address] AT [facility] [address] [facility] [address]     Phone:  [phone]     losartan 100 MG tablet                Primary Care  Provider Office Phone # Fax #    Pepe Spear [phone]       [facility] [address] [place] [address]        Thank you!     Thank you for choosing [facility]  for your care. Our goal is always to provide you with excellent care. Hearing back from our patients is one way we can continue to improve our services. Please take a few minutes to complete the written survey that you may receive in the mail after your visit with us. Thank you!             Your Updated Medication List - Protect others around you: Learn how to safely use, store and throw away your medicines at www.disposemymeds.org.          This list is accurate as of: 6/13/17 11:40 AM.  Always use your most recent med list.                   Brand Name Dispense Instructions for use    ACCU-CHEK JERMAINE PLUS test strip   Generic drug:  blood glucose monitoring     200 strip    USE TO TEST BLOOD SUGARS TWICE DAILY       aspirin 81 MG tablet     30 tablet    Take 1 tablet (81 mg) by mouth daily       blood glucose monitoring lancets     2 Box    USE WHEN TESTING BLOOD SUGARS TWICE DAILY.       glimepiride 4 MG tablet    AMARYL     TK 1/2 TO 1 T PO QPM UTD       LEVEMIR FLEXTOUCH 100 UNIT/ML injection   Generic drug:  insulin detemir      INJ 60 UNITS BID       losartan 100 MG tablet    COZAAR    90 tablet    Take 1 tablet (100 mg) by mouth daily       naproxen sodium 550 MG tablet    ANAPROX    60 tablet    TAKE 1 TABLET BY MOUTH TWICE DAILY, WITH MEALS       NOVOLOG SC      Inject Subcutaneous 3 times daily (with meals)       omeprazole 40 MG capsule    priLOSEC    90 capsule    Take 1 capsule (40 mg) by mouth daily       pravastatin 40 MG tablet    PRAVACHOL    90 tablet    Take 1 tablet (40 mg) by mouth daily       sertraline 50 MG tablet    ZOLOFT    90 tablet    Take 1 tablet (50 mg) by mouth daily       sildenafil 100 MG cap/tab    VIAGRA    6 tablet    Take 0.5-1 tablets ( mg) by mouth  daily as needed for erectile dysfunction       VICTOZA PEN 18 MG/3ML soln   Generic drug:  liraglutide

## 2017-06-26 NOTE — PROGRESS NOTES
SUBJECTIVE:                                                    Braden De Leon is a 47 year old male who presents to clinic today for the following health issues:    Follow up fracture right foot - The patient injured his right foot on 5/18/2017 while tripping down the stairs. He denies pain but is still experiencing swelling at the site. The patient had a comminuted moderately displaced fracture involving the right first proximal phalangeal head and interphalangeal joint. He has been wearing a post-op shoe which has been causing him to have calve and ankle pain.     Hypertension - The patient is currently taking 100 mg of losartan. He states that his blood pressure has been controlled but is slightly raised due to stress at work.    Diabetes - The patient's diabetes is managed with an endocrinologist. He will be starting 200 Humalog. Since the patient started to take Humalog, he has began to gain weight.    Mood Problems - The patient states that he has had increased irritability over the past two months. The patient is currently taking 50 mg sertraline.      Problem list and histories reviewed & adjusted, as indicated.  Additional history: as documented    ROS:  Constitutional, HEENT, cardiovascular, pulmonary, GI, , musculoskeletal, neuro, skin, endocrine and psych systems are negative, except as otherwise noted.    This document serves as a record of the services and decisions personally performed and made by Juan José Rangel MD. It was created on his behalf by Sandra Ludwig, a trained medical scribe. The creation of this document is based on the provider's statements to the medical scribe.  Sandra Ludwig 8:05 AM 6/27/2017  OBJECTIVE:                                                    /90 (BP Location: Left arm, Patient Position: Chair, Cuff Size: Adult Large)  Pulse 93  Temp 98.6  F (37  C) (Oral)  Ht 1.829 m (6')  Wt 115.7 kg (255 lb)  SpO2 98%  BMI 34.58 kg/m2 Body mass index is 34.58  kg/(m^2).   GENERAL: healthy, alert, well nourished, well hydrated, no distress  HENT: ear canals- normal; TMs- normal; Nose- normal; Mouth- no ulcers, no lesions  NECK: no tenderness, no adenopathy, no asymmetry, no masses, no stiffness; thyroid- normal to palpation  RESP: lungs clear to auscultation - no rales, no rhonchi, no wheezes  CV: regular rates and rhythm, normal S1 S2, no S3 or S4 and no murmur, no click or rub -  ABDOMEN: soft, no tenderness, no  hepatosplenomegaly, no masses, normal bowel sounds  MS: extremities- no gross deformities noted, no edema  SKIN: no suspicious lesions, no rashes    Diagnostic test results:  Xray - Early callous noted medially, comminuted right first proximal phalangeal head fracture, there appears to be increased displacement.     ASSESSMENT/PLAN:         Braden was seen today for recheck.    Diagnoses and all orders for this visit:    Closed nondisplaced fracture of proximal phalanx of right great toe, sub encounter -  Slowly improving and some callous noted, minimally tender. Start wearing normal shoes. Do not run for the next three weeks. Followup if pain worsens.    Proliferative diabetic retinopathy (H) - continue with eye doctor     Type 2 diabetes mellitus with stable proliferative retinopathy, with long-term current use of insulin, unspecified laterality (H) - continue with endocrinology - continue medications    Mood changes (H) - increase sertraline to 100 mg daily  -     sertraline (ZOLOFT) 100 MG tablet; Take 1 tablet (100 mg) by mouth daily  -     XR Toe Right G/E 2 Views; Future    Hypertension goal BP (blood pressure) < 140/90 - uncontrolled - continue medication. Patient declined additional medication. Followup in 2 weeks for blood pressure recheck.      Risks, benefits and alternatives of treatments discussed. Plan agreed on.      Followup: As needed    Will call, return to clinic, or go to ED if worsening or symptoms not improving as discussed.    See patient  instructions.       Health Maintenance Topics with due status: Overdue       Topic Date Due    PNEUMOVAX 1X HI RISK PATIENT < 65 (NO IB MSG) 03/18/1970    EYE EXAM Q1 YEAR 11/10/2016    FOOT EXAM Q1 YEAR 02/16/2017       Health maintenance reviewed/updated? Yes    The information in this document, created by a scribe for me, accurately reflects the services I personally performed and the decisions made by me. I have reviewed and approved this document for accuracy.      Az Rangel MD

## 2017-06-27 ENCOUNTER — OFFICE VISIT (OUTPATIENT)
Dept: FAMILY MEDICINE | Facility: CLINIC | Age: 47
End: 2017-06-27
Payer: COMMERCIAL

## 2017-06-27 ENCOUNTER — RADIANT APPOINTMENT (OUTPATIENT)
Dept: GENERAL RADIOLOGY | Facility: CLINIC | Age: 47
End: 2017-06-27
Attending: FAMILY MEDICINE
Payer: COMMERCIAL

## 2017-06-27 VITALS
SYSTOLIC BLOOD PRESSURE: 142 MMHG | OXYGEN SATURATION: 98 % | BODY MASS INDEX: 34.54 KG/M2 | HEIGHT: 72 IN | TEMPERATURE: 98.6 F | DIASTOLIC BLOOD PRESSURE: 82 MMHG | WEIGHT: 255 LBS | HEART RATE: 93 BPM

## 2017-06-27 DIAGNOSIS — E11.3593 PROLIFERATIVE DIABETIC RETINOPATHY OF BOTH EYES WITHOUT MACULAR EDEMA ASSOCIATED WITH TYPE 2 DIABETES MELLITUS (H): ICD-10-CM

## 2017-06-27 DIAGNOSIS — E11.3559 TYPE 2 DIABETES MELLITUS WITH STABLE PROLIFERATIVE RETINOPATHY, WITH LONG-TERM CURRENT USE OF INSULIN, UNSPECIFIED LATERALITY (H): ICD-10-CM

## 2017-06-27 DIAGNOSIS — Z79.4 TYPE 2 DIABETES MELLITUS WITH STABLE PROLIFERATIVE RETINOPATHY, WITH LONG-TERM CURRENT USE OF INSULIN, UNSPECIFIED LATERALITY (H): ICD-10-CM

## 2017-06-27 DIAGNOSIS — S92.414A CLOSED NONDISPLACED FRACTURE OF PROXIMAL PHALANX OF RIGHT GREAT TOE, INITIAL ENCOUNTER: Primary | ICD-10-CM

## 2017-06-27 DIAGNOSIS — R45.86 MOOD CHANGES: ICD-10-CM

## 2017-06-27 DIAGNOSIS — I10 HYPERTENSION GOAL BP (BLOOD PRESSURE) < 140/90: ICD-10-CM

## 2017-06-27 PROCEDURE — 99214 OFFICE O/P EST MOD 30 MIN: CPT | Mod: 24 | Performed by: FAMILY MEDICINE

## 2017-06-27 PROCEDURE — 73660 X-RAY EXAM OF TOE(S): CPT | Mod: RT

## 2017-06-27 RX ORDER — SERTRALINE HYDROCHLORIDE 100 MG/1
100 TABLET, FILM COATED ORAL DAILY
Qty: 90 TABLET | Refills: 1 | Status: SHIPPED | OUTPATIENT
Start: 2017-06-27 | End: 2017-12-23

## 2017-06-27 NOTE — NURSING NOTE
Chief Complaint   Patient presents with     RECHECK       Initial /90 (BP Location: Left arm, Patient Position: Chair, Cuff Size: Adult Large)  Pulse 93  Temp 98.6  F (37  C) (Oral)  Ht 6' (1.829 m)  Wt 255 lb (115.7 kg)  SpO2 98%  BMI 34.58 kg/m2 Estimated body mass index is 34.58 kg/(m^2) as calculated from the following:    Height as of this encounter: 6' (1.829 m).    Weight as of this encounter: 255 lb (115.7 kg).  Medication Reconciliation: complete

## 2017-06-27 NOTE — MR AVS SNAPSHOT
After Visit Summary   6/27/2017    Braden De Leon    MRN: [id number]           Patient Information     Date Of Birth          1970        Visit Information        Provider Department      6/27/2017 10:00 AM Juan José Rangel MD [facility]'s Diagnoses     Closed nondisplaced fracture of proximal phalanx of right great toe, initial encounter    -  1    Proliferative diabetic retinopathy (H)        Type 2 diabetes mellitus with stable proliferative retinopathy, with long-term current use of insulin, unspecified laterality (H)        Mood changes (H)           Follow-ups after your visit        Who to contact     If you have questions or need follow up information about today's clinic visit or your schedule please contact [facility] directly at [phone].  Normal or non-critical lab and imaging results will be communicated to you by MyChart, letter or phone within 4 business days after the clinic has received the results. If you do not hear from us within 7 days, please contact the clinic through HidInImagehart or phone. If you have a critical or abnormal lab result, we will notify you by phone as soon as possible.  Submit refill requests through GeoMetWatch or call your pharmacy and they will forward the refill request to us. Please allow 3 business days for your refill to be completed.          Additional Information About Your Visit        MyChart Information     GeoMetWatch gives you secure access to your electronic health record. If you see a primary care provider, you can also send messages to your care team and make appointments. If you have questions, please call your primary care clinic.  If you do not have a primary care provider, please call [phone] and they will assist you.        Care EveryWhere ID     This is your Care EveryWhere ID. This could be used by other organizations to access your [place] medical records  [id number]        Your Vitals  Were     Pulse Temperature Height Pulse Oximetry BMI (Body Mass Index)       93 98.6  F (37  C) (Oral) 6' (1.829 m) 98% 34.58 kg/m2        Blood Pressure from Last 3 Encounters:   06/27/17 142/82   06/13/17 150/85   05/23/17 120/84    Weight from Last 3 Encounters:   06/27/17 255 lb (115.7 kg)   06/13/17 255 lb 4.8 oz (115.8 kg)   05/23/17 256 lb (116.1 kg)                 Today's Medication Changes          These changes are accurate as of: 6/27/17 11:02 AM.  If you have any questions, ask your nurse or doctor.               These medicines have changed or have updated prescriptions.        Dose/Directions    sertraline 100 MG tablet   Commonly known as:  ZOLOFT   This may have changed:    - medication strength  - how much to take   Used for:  Mood changes (H)   Changed by:  Juan José Rangel MD        Dose:  100 mg   Take 1 tablet (100 mg) by mouth daily   Quantity:  90 tablet   Refills:  1         Stop taking these medicines if you haven't already. Please contact your care team if you have questions.     NOVOLOG SC   Stopped by:  Juan José Rangel MD                Where to get your medicines      These medications were sent to [facility] [address] [place] [address] AT [facility] [place] [address]  [address] [facility] [address]     Phone:  [phone]     sertraline 100 MG tablet                Primary Care Provider Office Phone # Fax #    Pepe Separ [phone]       [facility] [address] [place] [address]        Equal Access to Services     [facility] Center[facility] AH: Hadii giuliana calvo Socatalina, waaxda luqadaha, qaybta kaalmada adeegyada, nannette tuttle. So [facility] [phone].    ATENCIÓN: Si habla español, tiene a durham disposición servicios gratuitos de asistencia lingüística. Llame al [phone].    We comply with applicable federal civil rights laws and [place] laws. We do not discriminate on the basis of  race, color, national origin, age, disability sex, sexual orientation or gender identity.            Thank you!     Thank you for choosing [facility]  for your care. Our goal is always to provide you with excellent care. Hearing back from our patients is one way we can continue to improve our services. Please take a few minutes to complete the written survey that you may receive in the mail after your visit with us. Thank you!             Your Updated Medication List - Protect others around you: Learn how to safely use, store and throw away your medicines at www.disposemymeds.org.          This list is accurate as of: 6/27/17 11:02 AM.  Always use your most recent med list.                   Brand Name Dispense Instructions for use Diagnosis    ACCU-CHEK JERMAINE PLUS test strip   Generic drug:  blood glucose monitoring     200 strip    USE TO TEST BLOOD SUGARS TWICE DAILY    Type 2 diabetes mellitus without complication (H)       aspirin 81 MG tablet     30 tablet    Take 1 tablet (81 mg) by mouth daily    Type 2 diabetes mellitus without complication (H)       blood glucose monitoring lancets     2 Box    USE WHEN TESTING BLOOD SUGARS TWICE DAILY.    Type 2 diabetes mellitus without complication (H)       glimepiride 4 MG tablet    AMARYL     TK 1/2 TO 1 T PO QPM UTD        HUMALOG KWIKPEN 200 UNIT/ML soln   Generic drug:  Insulin Lispro      Inject 15-20 Units Subcutaneous 3 times daily (before meals)    Type 2 diabetes mellitus with stable proliferative retinopathy, with long-term current use of insulin, unspecified laterality (H)       LEVEMIR FLEXTOUCH 100 UNIT/ML injection   Generic drug:  insulin detemir      INJ 60 UNITS BID        losartan 100 MG tablet    COZAAR    90 tablet    Take 1 tablet (100 mg) by mouth daily    Hypertension goal BP (blood pressure) < 140/90, Type 2 diabetes mellitus with stable proliferative retinopathy, with long-term current use of insulin, unspecified laterality  (H)       naproxen sodium 550 MG tablet    ANAPROX    60 tablet    TAKE 1 TABLET BY MOUTH TWICE DAILY, WITH MEALS    Primary osteoarthritis of foot, Primary osteoarthritis of ankle, unspecified laterality       omeprazole 40 MG capsule    priLOSEC    90 capsule    Take 1 capsule (40 mg) by mouth daily    Gastroesophageal reflux disease without esophagitis       pravastatin 40 MG tablet    PRAVACHOL    90 tablet    Take 1 tablet (40 mg) by mouth daily    Hyperlipidemia with target LDL less than 70, Type 2 diabetes mellitus with stable proliferative retinopathy, with long-term current use of insulin, unspecified laterality (H), Proliferative diabetic retinopathy without macular edema associated with type 2 diabetes mellitus (H)       sertraline 100 MG tablet    ZOLOFT    90 tablet    Take 1 tablet (100 mg) by mouth daily    Mood changes (H)       sildenafil 100 MG cap/tab    VIAGRA    6 tablet    Take 0.5-1 tablets ( mg) by mouth daily as needed for erectile dysfunction    Erectile dysfunction, unspecified erectile dysfunction type       VICTOZA PEN 18 MG/3ML soln   Generic drug:  liraglutide

## 2017-06-28 ASSESSMENT — PATIENT HEALTH QUESTIONNAIRE - PHQ9: SUM OF ALL RESPONSES TO PHQ QUESTIONS 1-9: 9

## 2017-07-18 ENCOUNTER — TRANSFERRED RECORDS (OUTPATIENT)
Dept: HEALTH INFORMATION MANAGEMENT | Facility: CLINIC | Age: 47
End: 2017-07-18

## 2017-07-18 LAB
ALT SERPL-CCNC: 21 U/L (ref 9–46)
CHOLEST SERPL-MCNC: 126 MG/DL (ref 125–200)
CREAT SERPL-MCNC: 0.89 MG/DL (ref 0.6–1.35)
GFR SERPL CREATININE-BSD FRML MDRD: 100 ML/MIN/1.73M2
GLUCOSE SERPL-MCNC: 122 MG/DL (ref 65–99)
HBA1C MFR BLD: 6.7 % (ref 4–6)
HDLC SERPL-MCNC: 36 MG/DL
LDLC SERPL CALC-MCNC: 50 MG/DL
NONHDLC SERPL-MCNC: 90 MG/DL
POTASSIUM SERPL-SCNC: 4 MMOL/L (ref 3.5–5.3)
TRIGL SERPL-MCNC: 199 MG/DL

## 2017-07-23 ENCOUNTER — TRANSFERRED RECORDS (OUTPATIENT)
Dept: HEALTH INFORMATION MANAGEMENT | Facility: CLINIC | Age: 47
End: 2017-07-23

## 2017-08-01 ENCOUNTER — TRANSFERRED RECORDS (OUTPATIENT)
Dept: HEALTH INFORMATION MANAGEMENT | Facility: CLINIC | Age: 47
End: 2017-08-01

## 2017-09-01 NOTE — Clinical Note
[address] [address]  [phone]       September 1, 2017    Braden De Leon  [address] [facility] [facility] [address]    Dear Braden,    We care about your health and have reviewed your health plan and are making recommendations based on this review, to optimize your health.    You are in particular need of attention regarding:  -Blood Pressure (your goal is <140/90) BP Readings from Last 2 Encounters:   06/27/17 142/82   06/13/17 150/85        We are recommending that you:  -schedule a NURSE-ONLY BLOOD PRESSURE APPOINTMENT within the next 1-2 weeks.    In addition, here is a list of due or overdue Health Maintenance reminders.    Health Maintenance Due   Topic Date Due    Pneumovax Vaccine  03/18/1970    Diabetic Foot Exam - yearly  02/16/2017    Flu Vaccine - yearly  09/01/2017       To address the above recommendations, we encourage you to contact us at [phone], via Qriket or by contacting Central Scheduling toll free at [phone] 24 hours a day. They will assist you with finding the most convenient time and location.    Thank you for trusting [facility] and we appreciate the opportunity to serve you.  We look forward to supporting your healthcare needs in the future.    Healthy Regards,    Your [facility] Team

## 2017-10-24 ENCOUNTER — TRANSFERRED RECORDS (OUTPATIENT)
Dept: HEALTH INFORMATION MANAGEMENT | Facility: CLINIC | Age: 47
End: 2017-10-24

## 2017-10-31 ENCOUNTER — RADIANT APPOINTMENT (OUTPATIENT)
Dept: GENERAL RADIOLOGY | Facility: CLINIC | Age: 47
End: 2017-10-31
Attending: FAMILY MEDICINE
Payer: COMMERCIAL

## 2017-10-31 ENCOUNTER — OFFICE VISIT (OUTPATIENT)
Dept: FAMILY MEDICINE | Facility: CLINIC | Age: 47
End: 2017-10-31
Payer: COMMERCIAL

## 2017-10-31 VITALS
SYSTOLIC BLOOD PRESSURE: 126 MMHG | TEMPERATURE: 97.9 F | OXYGEN SATURATION: 99 % | HEART RATE: 91 BPM | BODY MASS INDEX: 35.21 KG/M2 | HEIGHT: 72 IN | DIASTOLIC BLOOD PRESSURE: 84 MMHG | WEIGHT: 260 LBS

## 2017-10-31 DIAGNOSIS — S99.929A FOOT INJURY: ICD-10-CM

## 2017-10-31 DIAGNOSIS — S92.421P: Primary | ICD-10-CM

## 2017-10-31 DIAGNOSIS — E11.3593 PROLIFERATIVE DIABETIC RETINOPATHY OF BOTH EYES WITHOUT MACULAR EDEMA ASSOCIATED WITH TYPE 2 DIABETES MELLITUS (H): ICD-10-CM

## 2017-10-31 DIAGNOSIS — E11.3599 PROLIFERATIVE DIABETIC RETINOPATHY ASSOCIATED WITH TYPE 2 DIABETES MELLITUS, UNSPECIFIED LATERALITY, UNSPECIFIED PROLIFERATIVE RETINOPATHY TYPE (H): ICD-10-CM

## 2017-10-31 LAB
BASOPHILS # BLD AUTO: 0 10E9/L (ref 0–0.2)
BASOPHILS NFR BLD AUTO: 0.4 %
DIFFERENTIAL METHOD BLD: NORMAL
EOSINOPHIL # BLD AUTO: 0.1 10E9/L (ref 0–0.7)
EOSINOPHIL NFR BLD AUTO: 1.3 %
ERYTHROCYTE [DISTWIDTH] IN BLOOD BY AUTOMATED COUNT: 12.9 % (ref 10–15)
HCT VFR BLD AUTO: 41.2 % (ref 40–53)
HGB BLD-MCNC: 14.3 G/DL (ref 13.3–17.7)
LYMPHOCYTES # BLD AUTO: 2.3 10E9/L (ref 0.8–5.3)
LYMPHOCYTES NFR BLD AUTO: 23.3 %
MCH RBC QN AUTO: 28.3 PG (ref 26.5–33)
MCHC RBC AUTO-ENTMCNC: 34.7 G/DL (ref 31.5–36.5)
MCV RBC AUTO: 81 FL (ref 78–100)
MONOCYTES # BLD AUTO: 0.5 10E9/L (ref 0–1.3)
MONOCYTES NFR BLD AUTO: 4.9 %
NEUTROPHILS # BLD AUTO: 6.9 10E9/L (ref 1.6–8.3)
NEUTROPHILS NFR BLD AUTO: 70.1 %
PLATELET # BLD AUTO: 236 10E9/L (ref 150–450)
RBC # BLD AUTO: 5.06 10E12/L (ref 4.4–5.9)
WBC # BLD AUTO: 9.8 10E9/L (ref 4–11)

## 2017-10-31 PROCEDURE — 85025 COMPLETE CBC W/AUTO DIFF WBC: CPT | Performed by: FAMILY MEDICINE

## 2017-10-31 PROCEDURE — 73660 X-RAY EXAM OF TOE(S): CPT | Mod: RT

## 2017-10-31 PROCEDURE — 99213 OFFICE O/P EST LOW 20 MIN: CPT | Performed by: FAMILY MEDICINE

## 2017-10-31 PROCEDURE — 36415 COLL VENOUS BLD VENIPUNCTURE: CPT | Performed by: FAMILY MEDICINE

## 2017-10-31 NOTE — NURSING NOTE
Chief Complaint   Patient presents with     RECHECK       Initial /84 (BP Location: Left arm, Patient Position: Sitting, Cuff Size: Adult Large)  Pulse 91  Temp 97.9  F (36.6  C) (Oral)  Ht 6' (1.829 m)  Wt 260 lb (117.9 kg)  SpO2 99%  BMI 35.26 kg/m2 Estimated body mass index is 35.26 kg/(m^2) as calculated from the following:    Height as of this encounter: 6' (1.829 m).    Weight as of this encounter: 260 lb (117.9 kg).  Medication Reconciliation: complete

## 2017-10-31 NOTE — MR AVS SNAPSHOT
After Visit Summary   10/31/2017    Braden De Leon    MRN: [id number]           Patient Information     Date Of Birth          1970        Visit Information        Provider Department      10/31/2017 11:00 AM Juan José Rangel MD [facility]        Today's Diagnoses     Closed displaced fracture of distal phalanx of right great toe with malunion, subsequent encounter    -  1    Need for prophylactic vaccination and inoculation against influenza        Need for prophylactic vaccination against Streptococcus pneumoniae (pneumococcus)           Follow-ups after your visit        Who to contact     If you have questions or need follow up information about today's clinic visit or your schedule please contact [facility] directly at [phone].  Normal or non-critical lab and imaging results will be communicated to you by MyChart, letter or phone within 4 business days after the clinic has received the results. If you do not hear from us within 7 days, please contact the clinic through [employer] or phone. If you have a critical or abnormal lab result, we will notify you by phone as soon as possible.  Submit refill requests through [employer] or call your pharmacy and they will forward the refill request to us. Please allow 3 business days for your refill to be completed.          Additional Information About Your Visit        MyChart Information     [employer] gives you secure access to your electronic health record. If you see a primary care provider, you can also send messages to your care team and make appointments. If you have questions, please call your primary care clinic.  If you do not have a primary care provider, please call [phone] and they will assist you.        Care EveryWhere ID     This is your Care EveryWhere ID. This could be used by other organizations to access your [facility] medical records  [id number]        Your Vitals Were     Pulse Temperature  Height Pulse Oximetry BMI (Body Mass Index)       91 97.9  F (36.6  C) (Oral) 6' (1.829 m) 99% 35.26 kg/m2        Blood Pressure from Last 3 Encounters:   10/31/17 126/84   06/27/17 142/82   06/13/17 150/85    Weight from Last 3 Encounters:   10/31/17 260 lb (117.9 kg)   06/27/17 255 lb (115.7 kg)   06/13/17 255 lb 4.8 oz (115.8 kg)              We Performed the Following     CBC with platelets and differential     HC FLU VAC PRESRV FREE QUAD SPLIT VIR 3+YRS IM  [[id number]]        Primary Care Provider Office Phone # Fax #    Pepe Spear [phone] [address] [facility] [address]        Equal Access to Services     QUAN WHELAN : Hadii aad ku hadasho Socatalina, waaxda luqadaha, qaybta kaalmada adelobo, nannette lisa . So [facility] [phone].    ATENCIÓN: Si habla español, tiene a durham disposición servicios gratuitos de asistencia lingüística. [facility] [phone].    We comply with applicable federal civil rights laws and [place] laws. We do not discriminate on the basis of race, color, national origin, age, disability, sex, sexual orientation, or gender identity.            Thank you!     Thank you for choosing [facility]  for your care. Our goal is always to provide you with excellent care. Hearing back from our patients is one way we can continue to improve our services. Please take a few minutes to complete the written survey that you may receive in the mail after your visit with us. Thank you!             Your Updated Medication List - Protect others around you: Learn how to safely use, store and throw away your medicines at www.disposemymeds.org.          This list is accurate as of: 10/31/17 12:05 PM.  Always use your most recent med list.                   Brand Name Dispense Instructions for use Diagnosis    ACCU-CHEK JERMAINE PLUS test strip   Generic drug:  blood glucose monitoring     200 strip    USE TO TEST BLOOD SUGARS TWICE DAILY     Type 2 diabetes mellitus without complication (H)       aspirin 81 MG tablet     30 tablet    Take 1 tablet (81 mg) by mouth daily    Type 2 diabetes mellitus without complication (H)       blood glucose monitoring lancets     2 Box    USE WHEN TESTING BLOOD SUGARS TWICE DAILY.    Type 2 diabetes mellitus without complication (H)       glimepiride 4 MG tablet    AMARYL     TK 1/2 TO 1 T PO QPM UTD        HUMALOG KWIKPEN 200 UNIT/ML soln   Generic drug:  Insulin Lispro      Inject 15-20 Units Subcutaneous 3 times daily (before meals)    Type 2 diabetes mellitus with stable proliferative retinopathy, with long-term current use of insulin, unspecified laterality (H)       LEVEMIR FLEXTOUCH 100 UNIT/ML injection   Generic drug:  insulin detemir      INJ 60 UNITS BID        losartan 100 MG tablet    COZAAR    90 tablet    Take 1 tablet (100 mg) by mouth daily    Hypertension goal BP (blood pressure) < 140/90, Type 2 diabetes mellitus with stable proliferative retinopathy, with long-term current use of insulin, unspecified laterality (H)       naproxen sodium 550 MG tablet    ANAPROX    60 tablet    TAKE 1 TABLET BY MOUTH TWICE DAILY, WITH MEALS    Primary osteoarthritis of foot, Primary osteoarthritis of ankle, unspecified laterality       omeprazole 40 MG capsule    priLOSEC    90 capsule    Take 1 capsule (40 mg) by mouth daily    Gastroesophageal reflux disease without esophagitis       pravastatin 40 MG tablet    PRAVACHOL    90 tablet    Take 1 tablet (40 mg) by mouth daily    Hyperlipidemia with target LDL less than 70, Type 2 diabetes mellitus with stable proliferative retinopathy, with long-term current use of insulin, unspecified laterality (H), Proliferative diabetic retinopathy without macular edema associated with type 2 diabetes mellitus (H)       sertraline 100 MG tablet    ZOLOFT    90 tablet    Take 1 tablet (100 mg) by mouth daily    Mood changes (H)       sildenafil 100 MG tablet    VIAGRA    6  tablet    Take 0.5-1 tablets ( mg) by mouth daily as needed for erectile dysfunction    Erectile dysfunction, unspecified erectile dysfunction type       VICTOZA PEN 18 MG/3ML soln   Generic drug:  liraglutide

## 2017-10-31 NOTE — PROGRESS NOTES
SUBJECTIVE:                                                    Braden De Leon is a 47 year old male who presents to clinic today for the following health issues:    Follow up right big toe fracture 06/2017-  Toe is larger and swollen but he reports it is getting less swollen.  It was bigger than the other toe prior to the fracture he reports. - no swelling or pain-    Problem list and histories reviewed & adjusted, as indicated.  Additional history: as documented    ROS:  Constitutional, HEENT, cardiovascular, pulmonary, GI, , musculoskeletal, neuro, skin, endocrine and psych systems are negative, except as otherwise noted.    OBJECTIVE:                                                    /84 (BP Location: Left arm, Patient Position: Sitting, Cuff Size: Adult Large)  Pulse 91  Temp 97.9  F (36.6  C) (Oral)  Ht 6' (1.829 m)  Wt 260 lb (117.9 kg)  SpO2 99%  BMI 35.26 kg/m2 Body mass index is 35.26 kg/(m^2).   GENERAL: healthy, alert, well nourished, well hydrated, no distress  MS: right 1st toe - still swollen and slight bluish hue - feels slioght warm - extremities- no gross deformities noted, no edema  SKIN: no suspicious lesions, no rashes  NEURO: strength and tone- normal, sensory exam- grossly normal, mentation- intact, speech- normal, reflexes- symmetric  Diagnostic test results:  IMPRESSION:  Comminuted fracture of the mid and distal first proximal  phalanx is again visualized. The fracture fragments appear more  displaced than on the prior exam. Prominent fracture lucencies raise  the question of nonunion.        ASSESSMENT/PLAN:         Braden was seen today for recheck.    Diagnoses and all orders for this visit:    Closed displaced fracture of distal phalanx of right great toe with malunion, subsequent encounter - some callous formation but still shows a displaced fracture of the proximal phalanx and nonunion - he has no related pain and normal function - I offered ortho referral but he feels  it is getting less swollen and wants to continue to observe.   -     CBC with platelets and differential    Proliferative diabetic retinopathy associated with type 2 diabetes mellitus, unspecified laterality, unspecified proliferative retinopathy type (H)/ Proliferative diabetic retinopathy of both eyes without macular edema associated with type 2 diabetes mellitus (H) - A1c is good lately - sees endocrine - and opthalmology     Risks, benefits and alternatives of treatments discussed. Plan agreed on.      Followup:if worsened symptoms.     Will call, return to clinic, or go to ED if worsening or symptoms not improving as discussed.    See patient instructions.     BMI:   Estimated body mass index is 35.26 kg/(m^2) as calculated from the following:    Height as of this encounter: 6' (1.829 m).    Weight as of this encounter: 260 lb (117.9 kg).   Weight management plan: Discussed healthy diet and exercise guidelines and patient will follow up in 6 months in clinic to re-evaluate.            Az Rangel MD   Pager: [phone]

## 2017-11-27 DIAGNOSIS — I10 HYPERTENSION GOAL BP (BLOOD PRESSURE) < 140/90: ICD-10-CM

## 2017-11-27 DIAGNOSIS — E11.3559 TYPE 2 DIABETES MELLITUS WITH STABLE PROLIFERATIVE RETINOPATHY, WITH LONG-TERM CURRENT USE OF INSULIN, UNSPECIFIED LATERALITY (H): ICD-10-CM

## 2017-11-27 DIAGNOSIS — Z79.4 TYPE 2 DIABETES MELLITUS WITH STABLE PROLIFERATIVE RETINOPATHY, WITH LONG-TERM CURRENT USE OF INSULIN, UNSPECIFIED LATERALITY (H): ICD-10-CM

## 2017-11-28 RX ORDER — LOSARTAN POTASSIUM 100 MG/1
TABLET ORAL
Qty: 90 TABLET | Refills: 3 | Status: SHIPPED | OUTPATIENT
Start: 2017-11-28 | End: 2018-11-05

## 2017-11-28 NOTE — TELEPHONE ENCOUNTER
Requested Prescriptions   Pending Prescriptions Disp Refills     losartan (COZAAR) 100 MG tablet [Pharmacy Med Name: LOSARTAN 100MG TABLETS]  Last Written Prescription Date:  6/13/2017  Last Fill Quantity: 90 tablet,  # refills: 1   Last Office Visit with [facility], [facility] or [facility] prescribing provider:  10/31/2017   Future Office Visit:      90 tablet 0     Sig: TAKE 1 TABLET BY MOUTH DAILY    Angiotensin-II Receptors Passed    11/27/2017  3:26 AM       Passed - Blood pressure under 140/90 in past 12 months.    BP Readings from Last 3 Encounters:   10/31/17 126/84   06/27/17 142/82   06/13/17 150/85                Passed - Recent or future visit with authorizing provider's specialty    Patient had office visit in the last year or has a visit in the next 30 days with authorizing provider.  See chart review.              Passed - Patient is age 18 or older       Passed - Normal serum creatinine on file in past 12 months    Recent Labs   Lab Test 07/18/17   CR  0.89            Passed - Normal serum potassium on file in past 12 months    Recent Labs   Lab Test 07/18/17   POTASSIUM  4.0

## 2017-11-28 NOTE — TELEPHONE ENCOUNTER
Prescription approved per [facility] [place].    Linda Hu, BS, RN, N  [facility]) [phone]

## 2017-12-03 DIAGNOSIS — N52.9 ERECTILE DYSFUNCTION, UNSPECIFIED ERECTILE DYSFUNCTION TYPE: ICD-10-CM

## 2017-12-06 RX ORDER — SILDENAFIL CITRATE 100 MG
TABLET ORAL
Qty: 6 TABLET | Refills: 0 | Status: SHIPPED | OUTPATIENT
Start: 2017-12-06 | End: 2018-10-29

## 2017-12-06 NOTE — TELEPHONE ENCOUNTER
Requested Prescriptions   Pending Prescriptions Disp Refills     VIAGRA 100 MG tablet [Pharmacy Med Name: VIAGRA 100MG TABLETS]  Last Written Prescription Date:  1/10/2017  Last Fill Quantity: 6 tablet,  # refills: 3   Last Office Visit with G, P or [facility] prescribing provider:  10/31/2017   Future Office Visit:      6 tablet 0     Sig: TAKE 1/2 TO 1 TABLET(50  MG) BY MOUTH DAILY AS NEEDED FOR ERECTILE DYSFUNCTION    Erectile Dysfuction Protocol Passed    12/3/2017  6:08 PM       Passed - Absence of nitrates on medication list       Passed - Absence of Alpha Blockers on Med list       Passed - Recent or future visit with authorizing provider    Patient had office visit in the last year or has a visit in the next 30 days with authorizing provider.  See chart review.              Passed - Patient is age 18 or older

## 2017-12-06 NOTE — TELEPHONE ENCOUNTER
Prescription approved per [facility] Refill Protocol.    Linda Hu, BS, RN, N  [facility]) [phone]

## 2017-12-23 DIAGNOSIS — R45.86 MOOD CHANGES: ICD-10-CM

## 2017-12-26 NOTE — TELEPHONE ENCOUNTER
Requested Prescriptions   Pending Prescriptions Disp Refills     sertraline (ZOLOFT) 100 MG tablet [Pharmacy Med Name: SERTRALINE 100MG TABLETS]  Last Written Prescription Date:  6/27/17  Last Fill Quantity: 90 tablet,  # refills: 1   Last Office Visit with [facility], [facility] or [facility] prescribing provider:  10/31/17   Future Office Visit:      90 tablet 0     Sig: TAKE 1 TABLET(100 MG) BY MOUTH DAILY    SSRIs Protocol Passed    12/23/2017  3:26 AM       Passed - Recent or future visit with authorizing provider    Patient had office visit in the last year or has a visit in the next 30 days with authorizing provider.  See chart review.          Passed - Patient is age 18 or older

## 2017-12-28 ENCOUNTER — TELEPHONE (OUTPATIENT)
Dept: FAMILY MEDICINE | Facility: CLINIC | Age: 47
End: 2017-12-28

## 2017-12-28 RX ORDER — SERTRALINE HYDROCHLORIDE 100 MG/1
TABLET, FILM COATED ORAL
Qty: 90 TABLET | Refills: 3 | Status: SHIPPED | OUTPATIENT
Start: 2017-12-28 | End: 2018-01-30

## 2017-12-28 ASSESSMENT — PATIENT HEALTH QUESTIONNAIRE - PHQ9: SUM OF ALL RESPONSES TO PHQ QUESTIONS 1-9: 5

## 2017-12-28 NOTE — TELEPHONE ENCOUNTER
PHQ-9 SCORE 11/28/2016 6/27/2017 12/28/2017   Total Score - - -   Total Score MyChart - - -   Total Score 2 9 5         Routing refill request to provider for review/approval because:  Labs out of range:  PHQ-9 score is out of range for refill protocol.    Patient feels medication is working well.      Linda Hu, BS, RN, PHN  [facility]) [phone]

## 2017-12-28 NOTE — TELEPHONE ENCOUNTER
Reason for Call:  Other returning call    Detailed comments: Pt called this afternoon returning a phone call from a nurse. Please give pt a phone call back ASAP. Thank you.    Phone Number Patient can be reached at: Home number on file [phone] (home)    Best Time:     Can we leave a detailed message on this number? YES    Call taken on 12/28/2017 at 1:21 PM by Sara Mckeon

## 2017-12-28 NOTE — TELEPHONE ENCOUNTER
Associated diagnosis is mood changes. Depression is not on problem list.  PHQ-9 SCORE 9/13/2016 11/28/2016 6/27/2017   Total Score - - -   Total Score MyChart - - -   Total Score 5 2 9     Patient has not used My Chart in over 1 year. Left non-detailed message to call back and ask to speak with a triage nurse.   Routing to provider   Cecily Barragan RN

## 2017-12-28 NOTE — TELEPHONE ENCOUNTER
See 12/23/2017 refill encounter.    Linda Hu, BS, RN, N  [facility]) [phone]

## 2018-01-03 NOTE — TELEPHONE ENCOUNTER
Left non-detailed message for patient to call back.  Please schedule follow up when patient calls back.  (see previous notes for details)    Anushka Bray

## 2018-01-24 ENCOUNTER — TELEPHONE (OUTPATIENT)
Dept: ENDOCRINOLOGY | Facility: CLINIC | Age: 48
End: 2018-01-24

## 2018-01-24 NOTE — TELEPHONE ENCOUNTER
Dr. Bustos,    Patient is scheduled with you on 3/27/18. Patient is only able to come to clinic on Tuesdays.  Please advise.     Audrey Pratt RN

## 2018-01-24 NOTE — TELEPHONE ENCOUNTER
Reason for Call:   appointment    Detailed comments: Patient upset cause he wanted to get in with  next week and  Said he was told he had to see  by the end of January, I explained that the Dr is booked out  Until the end of March, and the patient can only come in on Tuesday    He said you better give  this message and I said Yes I will and he said [facility]  So please call him    Phone Number Patient can be reached at: Home number on file [phone] (home)    Best Time: anytime    Can we leave a detailed message on this number? YES    Call taken on 1/24/2018 at 4:35 PM by Héctor Leonardo

## 2018-01-25 DIAGNOSIS — Z79.4 TYPE 2 DIABETES MELLITUS WITH STABLE PROLIFERATIVE RETINOPATHY, WITH LONG-TERM CURRENT USE OF INSULIN, UNSPECIFIED LATERALITY (H): ICD-10-CM

## 2018-01-25 DIAGNOSIS — E11.3599 PROLIFERATIVE DIABETIC RETINOPATHY WITHOUT MACULAR EDEMA ASSOCIATED WITH TYPE 2 DIABETES MELLITUS (H): ICD-10-CM

## 2018-01-25 DIAGNOSIS — E78.5 HYPERLIPIDEMIA WITH TARGET LDL LESS THAN 70: ICD-10-CM

## 2018-01-25 DIAGNOSIS — E11.3559 TYPE 2 DIABETES MELLITUS WITH STABLE PROLIFERATIVE RETINOPATHY, WITH LONG-TERM CURRENT USE OF INSULIN, UNSPECIFIED LATERALITY (H): ICD-10-CM

## 2018-01-25 NOTE — TELEPHONE ENCOUNTER
"Requested Prescriptions   Pending Prescriptions Disp Refills     pravastatin (PRAVACHOL) 40 MG tablet [Pharmacy Med Name: PRAVASTATIN 40MG TABLETS] 90 tablet 0    Last Written Prescription Date:  1.10.17  Last Fill Quantity: 90,  # refills: 1   [place] [facility] [place]:  10.31.17   Future Office Visit:    Next 5 appointments (look out 90 days)     Jan 30, 2018 10:40 AM CST   [facility]    [address] [place] [address]   [phone]            Feb 06, 2018 12:30 PM CST   [facility]    [address] [facility] [address]   [phone]            Mar 27, 2018  9:00 AM CDT   Office Visit with Vic Bustos MD   [facility]    [address] [address]   [phone]                  Sig: TAKE 1 TABLET(40 MG) BY MOUTH DAILY    Statins Protocol Passed    1/25/2018  3:28 AM       Passed - LDL on file in past 12 months    Recent Labs   Lab Test 07/18/17   LDL  50            Passed - No abnormal creatine kinase in past 12 months    No lab results found.         Passed - Recent or future visit with authorizing provider    Patient had office visit in the last year or has a visit in the next 30 days with authorizing provider.  See \"Patient Info\" tab in inbasket, or \"Choose Columns\" in Meds & Orders section of the refill encounter.            Passed - Patient is age 18 or older          "

## 2018-01-26 RX ORDER — PRAVASTATIN SODIUM 40 MG
TABLET ORAL
Qty: 90 TABLET | Refills: 0 | Status: SHIPPED | OUTPATIENT
Start: 2018-01-26 | End: 2018-04-22

## 2018-01-26 NOTE — TELEPHONE ENCOUNTER
Prescription approved per [facility] Refill Protocol.  Sarah Early RN  [facility]

## 2018-01-30 ENCOUNTER — OFFICE VISIT (OUTPATIENT)
Dept: FAMILY MEDICINE | Facility: CLINIC | Age: 48
End: 2018-01-30
Payer: COMMERCIAL

## 2018-01-30 VITALS
WEIGHT: 257 LBS | OXYGEN SATURATION: 96 % | BODY MASS INDEX: 34.81 KG/M2 | SYSTOLIC BLOOD PRESSURE: 138 MMHG | DIASTOLIC BLOOD PRESSURE: 88 MMHG | HEART RATE: 91 BPM | TEMPERATURE: 97.9 F | HEIGHT: 72 IN

## 2018-01-30 DIAGNOSIS — E11.3599 PROLIFERATIVE DIABETIC RETINOPATHY ASSOCIATED WITH TYPE 2 DIABETES MELLITUS, UNSPECIFIED LATERALITY, UNSPECIFIED PROLIFERATIVE RETINOPATHY TYPE (H): ICD-10-CM

## 2018-01-30 DIAGNOSIS — R45.86 MOOD CHANGES: ICD-10-CM

## 2018-01-30 DIAGNOSIS — E66.811 OBESITY (BMI 30.0-34.9): ICD-10-CM

## 2018-01-30 DIAGNOSIS — E78.5 HYPERLIPIDEMIA WITH TARGET LDL LESS THAN 70: ICD-10-CM

## 2018-01-30 DIAGNOSIS — E11.3559 TYPE 2 DIABETES MELLITUS WITH STABLE PROLIFERATIVE RETINOPATHY, WITH LONG-TERM CURRENT USE OF INSULIN, UNSPECIFIED LATERALITY (H): Primary | ICD-10-CM

## 2018-01-30 DIAGNOSIS — Z79.4 TYPE 2 DIABETES MELLITUS WITH STABLE PROLIFERATIVE RETINOPATHY, WITH LONG-TERM CURRENT USE OF INSULIN, UNSPECIFIED LATERALITY (H): Primary | ICD-10-CM

## 2018-01-30 DIAGNOSIS — Z51.81 MEDICATION MONITORING ENCOUNTER: ICD-10-CM

## 2018-01-30 DIAGNOSIS — R53.83 FATIGUE, UNSPECIFIED TYPE: ICD-10-CM

## 2018-01-30 DIAGNOSIS — K21.9 GASTROESOPHAGEAL REFLUX DISEASE WITHOUT ESOPHAGITIS: ICD-10-CM

## 2018-01-30 DIAGNOSIS — I10 HYPERTENSION GOAL BP (BLOOD PRESSURE) < 140/90: ICD-10-CM

## 2018-01-30 DIAGNOSIS — Z12.11 SCREEN FOR COLON CANCER: ICD-10-CM

## 2018-01-30 DIAGNOSIS — Z12.5 SCREENING FOR PROSTATE CANCER: ICD-10-CM

## 2018-01-30 LAB
ALBUMIN SERPL-MCNC: 3.6 G/DL (ref 3.4–5)
ALBUMIN UR-MCNC: >=300 MG/DL
ALP SERPL-CCNC: 96 U/L (ref 40–150)
ALT SERPL W P-5'-P-CCNC: 22 U/L (ref 0–70)
AMORPH CRY #/AREA URNS HPF: ABNORMAL /HPF
ANION GAP SERPL CALCULATED.3IONS-SCNC: 7 MMOL/L (ref 3–14)
APPEARANCE UR: CLEAR
AST SERPL W P-5'-P-CCNC: 26 U/L (ref 0–45)
BILIRUB SERPL-MCNC: 0.7 MG/DL (ref 0.2–1.3)
BILIRUB UR QL STRIP: NEGATIVE
BUN SERPL-MCNC: 17 MG/DL (ref 7–30)
CALCIUM SERPL-MCNC: 8.6 MG/DL (ref 8.5–10.1)
CHLORIDE SERPL-SCNC: 113 MMOL/L (ref 94–109)
CHOLEST SERPL-MCNC: 134 MG/DL
CK SERPL-CCNC: 135 U/L (ref 30–300)
CO2 SERPL-SCNC: 23 MMOL/L (ref 20–32)
COLOR UR AUTO: YELLOW
CREAT SERPL-MCNC: 0.93 MG/DL (ref 0.66–1.25)
CREAT UR-MCNC: 197 MG/DL
ERYTHROCYTE [DISTWIDTH] IN BLOOD BY AUTOMATED COUNT: 13 % (ref 10–15)
GFR SERPL CREATININE-BSD FRML MDRD: 86 ML/MIN/1.7M2
GLUCOSE SERPL-MCNC: 132 MG/DL (ref 70–99)
GLUCOSE UR STRIP-MCNC: NEGATIVE MG/DL
HBA1C MFR BLD: 6.6 % (ref 4.3–6)
HCT VFR BLD AUTO: 41.2 % (ref 40–53)
HDLC SERPL-MCNC: 42 MG/DL
HGB BLD-MCNC: 14.4 G/DL (ref 13.3–17.7)
HGB UR QL STRIP: ABNORMAL
KETONES UR STRIP-MCNC: NEGATIVE MG/DL
LDLC SERPL CALC-MCNC: 61 MG/DL
LEUKOCYTE ESTERASE UR QL STRIP: NEGATIVE
MCH RBC QN AUTO: 28.1 PG (ref 26.5–33)
MCHC RBC AUTO-ENTMCNC: 35 G/DL (ref 31.5–36.5)
MCV RBC AUTO: 80 FL (ref 78–100)
MICROALBUMIN UR-MCNC: 2110 MG/L
MICROALBUMIN/CREAT UR: 1071.07 MG/G CR (ref 0–17)
NITRATE UR QL: NEGATIVE
NON-SQ EPI CELLS #/AREA URNS LPF: ABNORMAL /LPF
NONHDLC SERPL-MCNC: 92 MG/DL
PH UR STRIP: 5 PH (ref 5–7)
PLATELET # BLD AUTO: 236 10E9/L (ref 150–450)
POTASSIUM SERPL-SCNC: 4.1 MMOL/L (ref 3.4–5.3)
PROT SERPL-MCNC: 6.8 G/DL (ref 6.8–8.8)
PSA SERPL-ACNC: 0.84 UG/L (ref 0–4)
RBC # BLD AUTO: 5.13 10E12/L (ref 4.4–5.9)
RBC #/AREA URNS AUTO: ABNORMAL /HPF
SODIUM SERPL-SCNC: 143 MMOL/L (ref 133–144)
SOURCE: ABNORMAL
SP GR UR STRIP: >1.03 (ref 1–1.03)
TRIGL SERPL-MCNC: 153 MG/DL
TSH SERPL DL<=0.005 MIU/L-ACNC: 0.65 MU/L (ref 0.4–4)
UROBILINOGEN UR STRIP-ACNC: 0.2 EU/DL (ref 0.2–1)
WBC # BLD AUTO: 8 10E9/L (ref 4–11)
WBC #/AREA URNS AUTO: ABNORMAL /HPF

## 2018-01-30 PROCEDURE — 36415 COLL VENOUS BLD VENIPUNCTURE: CPT | Performed by: FAMILY MEDICINE

## 2018-01-30 PROCEDURE — G0103 PSA SCREENING: HCPCS | Performed by: FAMILY MEDICINE

## 2018-01-30 PROCEDURE — 85027 COMPLETE CBC AUTOMATED: CPT | Performed by: FAMILY MEDICINE

## 2018-01-30 PROCEDURE — 82306 VITAMIN D 25 HYDROXY: CPT | Performed by: FAMILY MEDICINE

## 2018-01-30 PROCEDURE — 83036 HEMOGLOBIN GLYCOSYLATED A1C: CPT | Performed by: FAMILY MEDICINE

## 2018-01-30 PROCEDURE — 99214 OFFICE O/P EST MOD 30 MIN: CPT | Performed by: FAMILY MEDICINE

## 2018-01-30 PROCEDURE — 80061 LIPID PANEL: CPT | Performed by: FAMILY MEDICINE

## 2018-01-30 PROCEDURE — 81001 URINALYSIS AUTO W/SCOPE: CPT | Performed by: FAMILY MEDICINE

## 2018-01-30 PROCEDURE — 84403 ASSAY OF TOTAL TESTOSTERONE: CPT | Performed by: FAMILY MEDICINE

## 2018-01-30 PROCEDURE — 84443 ASSAY THYROID STIM HORMONE: CPT | Performed by: FAMILY MEDICINE

## 2018-01-30 PROCEDURE — 84270 ASSAY OF SEX HORMONE GLOBUL: CPT | Performed by: FAMILY MEDICINE

## 2018-01-30 PROCEDURE — 82550 ASSAY OF CK (CPK): CPT | Performed by: FAMILY MEDICINE

## 2018-01-30 PROCEDURE — 82043 UR ALBUMIN QUANTITATIVE: CPT | Performed by: FAMILY MEDICINE

## 2018-01-30 PROCEDURE — 80053 COMPREHEN METABOLIC PANEL: CPT | Performed by: FAMILY MEDICINE

## 2018-01-30 RX ORDER — NAPROXEN SODIUM 220 MG
440 TABLET ORAL AT BEDTIME
Qty: 180 TABLET | Refills: 3
Start: 2018-01-30

## 2018-01-30 RX ORDER — SERTRALINE HYDROCHLORIDE 100 MG/1
150 TABLET, FILM COATED ORAL DAILY
Qty: 135 TABLET | Refills: 3 | Status: SHIPPED | OUTPATIENT
Start: 2018-01-30 | End: 2018-10-09

## 2018-01-30 RX ORDER — FLASH GLUCOSE SENSOR
KIT MISCELLANEOUS
Qty: 9 EACH | Refills: 3
Start: 2018-01-30 | End: 2019-01-04

## 2018-01-30 ASSESSMENT — ANXIETY QUESTIONNAIRES
2. NOT BEING ABLE TO STOP OR CONTROL WORRYING: NOT AT ALL
5. BEING SO RESTLESS THAT IT IS HARD TO SIT STILL: SEVERAL DAYS
3. WORRYING TOO MUCH ABOUT DIFFERENT THINGS: SEVERAL DAYS
1. FEELING NERVOUS, ANXIOUS, OR ON EDGE: NOT AT ALL
6. BECOMING EASILY ANNOYED OR IRRITABLE: NOT AT ALL
7. FEELING AFRAID AS IF SOMETHING AWFUL MIGHT HAPPEN: SEVERAL DAYS
GAD7 TOTAL SCORE: 4

## 2018-01-30 ASSESSMENT — PATIENT HEALTH QUESTIONNAIRE - PHQ9: 5. POOR APPETITE OR OVEREATING: SEVERAL DAYS

## 2018-01-30 NOTE — NURSING NOTE
Chief Complaint   Patient presents with     Recheck Medication       Initial /88  Pulse 91  Temp 97.9  F (36.6  C) (Oral)  Ht 6' (1.829 m)  Wt 257 lb (116.6 kg)  SpO2 96%  BMI 34.86 kg/m2 Estimated body mass index is 34.86 kg/(m^2) as calculated from the following:    Height as of this encounter: 6' (1.829 m).    Weight as of this encounter: 257 lb (116.6 kg)..  BP completed using cuff size: zabrina Harmon MA

## 2018-01-30 NOTE — PROGRESS NOTES
SUBJECTIVE:   Braden De Leon is a 47 year old male who presents to clinic today for the following health issues:    Diabetes Follow-up    Patient is checking blood sugars: 14-15 times daily.    Avg is 131 in the last week, 138 in the last month.   Blood sugar testing frequency justification: Uncontrolled diabetes    Diabetic concerns: None     Symptoms of hypoglycemia (low blood sugar): very rarely     Paresthesias (numbness or burning in feet) or sores: Yes occasional burning in feet     Date of last diabetic eye exam: Nov 2017    He has a new blood glucose meter, the Freestyle Miranda, that works using a small sensor implanted in the upper arm. It allows him to check his blood sugar as often as he wants throughout the day. He is seeing Dr. Bustos as well for diabetes management and he has suggested this new meter.     Lab Results   Component Value Date    A1C 6.6 01/30/2018    A1C 6.7 07/18/2017    A1C 7.4 04/11/2017    A1C 10.0 01/10/2017    A1C 10.5 11/15/2016     Hyperlipidemia Follow-Up      Rate your low fat/cholesterol diet?: fair    Taking statin?  Yes, no muscle aches from statin - does note increased fatigue    Other lipid medications/supplements?:  none    He is fasting today, so can repeat his cholesterol labs and PSA today since he is having blood drawn    Recent Labs   Lab Test  01/30/18   1110 07/18/17   05/12/15   1040  09/09/14   1017   CHOL  134  126   < >  167  174   HDL  42  36*   < >  44  41   LDL  61  50   < >  97  86   TRIG  153*  199*   < >  129  235*   CHOLHDLRATIO   --    --    --   3.8  4.2    < > = values in this interval not displayed.     Hypertension Follow-up      Outpatient blood pressures are not being checked.    Low Salt Diet: low salt    He reports that his blood pressure is usually 120/80.    BP Readings from Last 2 Encounters:   01/30/18 138/88   10/31/17 126/84     Hemoglobin A1C (%)   Date Value   01/30/2018 6.6 (H)   07/18/2017 6.7 (H)     LDL Cholesterol Calculated  (mg/dL)   Date Value   01/30/2018 61   07/18/2017 50       GERD -  No issues    Fatigue  He notes that in the last few months he has been dealing with fatigue and day time drowsiness. He used to get only 4 hours of sleep because of his job, but now gets 6 hours of sleep with his new job. He is more fatigued earlier in the day, he is tired on his days off, tired at his job.      PHQ9 score: 8  GAD7 score: 4    Problem list and histories reviewed & adjusted, as indicated.  Additional history: as documented    Wt Readings from Last 4 Encounters:   01/30/18 257 lb (116.6 kg)   10/31/17 260 lb (117.9 kg)   06/27/17 255 lb (115.7 kg)   06/13/17 255 lb 4.8 oz (115.8 kg)       Health Maintenance    Health Maintenance Due   Topic Date Due     PNEUMOVAX 1X HI RISK PATIENT < 65 (NO IB MSG)  03/18/1970     FOOT EXAM Q1 YEAR  02/16/2017     BMP Q1 YR  01/10/2018     MICROALBUMIN Q1 YEAR  01/10/2018     PSA Q1 YR  01/10/2018     WELLNESS VISIT Q1 YR  01/10/2018     A1C Q6 MO  01/18/2018       Current Problem List    Patient Active Problem List   Diagnosis     Esophageal reflux     Hypertension goal BP (blood pressure) < 140/90     Type 2 diabetes, HbA1c goal < 7% (H)     Advanced directives, counseling/discussion     Migraine     Benign neoplasm of colon     Esophagitis     Esophageal stricture     Tarsal tunnel syndrome     Other enthesopathy of ankle and tarsus     ED (erectile dysfunction)     Obesity (BMI 30.0-34.9)     Hyperlipidemia with target LDL less than 70     Closed nondisplaced fracture of proximal phalanx of right great toe, initial encounter     Proliferative diabetic retinopathy of both eyes without macular edema associated with type 2 diabetes mellitus (H)     Proliferative diabetic retinopathy (H)     Diabetic macular edema (H)     Type 2 diabetes mellitus with stable proliferative retinopathy, with long-term current use of insulin, unspecified laterality (H)       Past Medical History    Past Medical History:    Diagnosis Date     Benign neoplasm of colon 11/07, 2/12    adenomatous polyps - due 5 yrs     Diabetic macular edema (H)     bilateral - avastatin x 8 - Dr CANDELARIA Zuñiga     ED (erectile dysfunction)      Esophageal reflux 1995     Esophageal stricture 8/13     Esophagitis 8/13    LA Grade A     Hyperlipidemia LDL goal < 70      Hypertension goal BP (blood pressure) < 140/90 3/09     Migraine 1986    rare migraines 2/yr     Obesity (BMI 30.0-34.9)      Other premature beats 11/07    bigeminy     Proliferative diabetic retinopathy (H) 06/2017    [facility] - Avastatin - Dr Rosalva Zuñiga     Type 2 diabetes, HbA1c goal < 7% (H) 1992    Dr Bustos     Unspecified gastritis and gastroduodenitis without mention of hemorrhage 1995       Past Surgical History    Past Surgical History:   Procedure Laterality Date     COLONOSCOPY  11/07, 2/12    polyps x 2 - internal hemorrhoids - adenomatous polyps - due every 5 yrs     COLONOSCOPY N/A 2/21/2017    diverticula x 1 - due 5 yrs     ESOPHAGOSCOPY, GASTROSCOPY, DUODENOSCOPY (EGD), COMBINED  8/20/2013    Esophagitis LA Grade A, esophageal stricture     HC REPAIR ING HERNIA,5+Y/O,REDUCIBL  1978    rt     STRESS ECHO (METRO)  3/12    normal       Current Medications    Current Outpatient Prescriptions   Medication Sig Dispense Refill     continuous blood glucose monitoring (FREESTYLE CHRISTIANE) sensor For use with Freestyle Christiane Flash  for continuous monitioring of blood glucose levels. Replace sensor every 10 days. 9 each 3     naproxen sodium (ALEVE) 220 MG tablet Take 2 tablets (440 mg) by mouth At Bedtime 180 tablet 3     sertraline (ZOLOFT) 100 MG tablet Take 1.5 tablets (150 mg) by mouth daily 135 tablet 3     pravastatin (PRAVACHOL) 40 MG tablet TAKE 1 TABLET(40 MG) BY MOUTH DAILY 90 tablet 0     VIAGRA 100 MG tablet TAKE 1/2 TO 1 TABLET(50  MG) BY MOUTH DAILY AS NEEDED FOR ERECTILE DYSFUNCTION 6 tablet 0     losartan (COZAAR) 100 MG tablet TAKE 1 TABLET BY MOUTH DAILY 90  tablet 3     Insulin Lispro (HUMALOG KWIKPEN) 200 UNIT/ML soln Inject 15-20 Units Subcutaneous 3 times daily (before meals)       glimepiride (AMARYL) 4 MG tablet TK 1/2 TO 1 T PO QPM UTD  12     omeprazole (PRILOSEC) 40 MG capsule Take 1 capsule (40 mg) by mouth daily 90 capsule 3     VICTOZA PEN 18 MG/3ML soln   12     LEVEMIR FLEXTOUCH 100 UNIT/ML soln INJ 60 UNITS BID  6     blood glucose monitoring (SOFTCLIX) lancets USE WHEN TESTING BLOOD SUGARS TWICE DAILY. 2 Box 3     aspirin 81 MG tablet Take 1 tablet (81 mg) by mouth daily 30 tablet      [DISCONTINUED] sertraline (ZOLOFT) 100 MG tablet TAKE 1 TABLET(100 MG) BY MOUTH DAILY 90 tablet 3       Allergies    Allergies   Allergen Reactions     Metformin Hcl [Riomet]      Nausea and throat tightening     Penicillins      anaphylaxis     Thiethylperazine Maleate      Torecan Throat Swelling       Immunizations    Immunization History   Administered Date(s) Administered     TD (ADULT, 7+) 04/01/2001       Family History    Family History   Problem Relation Age of Onset     Hypertension Mother 58     Connective Tissue Disorder Father 70     DIABETES Father 55     Breast Cancer Maternal Grandmother      Respiratory Paternal Grandfather      Colon Cancer No family hx of        Social History    Social History     Social History     Marital status:      Spouse name: Lela     Number of children: 1     Years of education: 13     Occupational History     manage [employer]     Social History Main Topics     Smoking status: Never Smoker     Smokeless tobacco: Never Used     Alcohol use No     Drug use: No     Sexual activity: Yes     Partners: Female     Other Topics Concern      Service Yes     [place]     Caffeine Concern Yes     24 oz diet     Exercise Yes     3-5/wk, Ref's     Seat Belt Yes     Parent/Sibling W/ Cabg, Mi Or Angioplasty Before 65f 55m? No     Social History Narrative       All above reviewed and updated, all stable  unless otherwise noted    Recent labs reviewed    ROS:  Constitutional, HEENT, cardiovascular, pulmonary, GI, , musculoskeletal, neuro, skin, endocrine and psych systems are negative, except as in HPI or otherwise noted       OBJECTIVE:                                                    /88  Pulse 91  Temp 97.9  F (36.6  C) (Oral)  Ht 6' (1.829 m)  Wt 257 lb (116.6 kg)  SpO2 96%  BMI 34.86 kg/m2  Body mass index is 34.86 kg/(m^2).  GENERAL: healthy, alert and no distress, obese  HENT: ear canals and TM's normal upon viewing with otoscope, nose and mouth without ulcers or lesions upon viewing with otoscope  RESP: lungs clear to auscultation - no rales, no rhonchi, no wheezes  CV: regular rates and rhythm, normal S1 S2, no S3 or S4 and no murmur, no click or rub - no peripheral edema, and peripheral pulses strong.   ABDOMEN: soft, no tenderness, no  hepatosplenomegaly, no masses, normal bowel sounds  MS: extremities- he has a mild deformity on his left foot, great toe, non-painful.    SKIN: no suspicious lesions, no rashes to visible skin  NEURO: mentation intact and speech normal  BACK: no CVA tenderness, no paralumbar tenderness  PSYCH: Alert and oriented times 3; speech- coherent , normal rate and volume; able to articulate logical thoughts, able to abstract reason, no tangential thoughts, no hallucinations or delusions, affect- normal    DIAGNOSTICS/PROCEDURES:                                                      Results for orders placed or performed in visit on 01/30/18 (from the past 24 hour(s))   Comprehensive metabolic panel   Result Value Ref [facility]    Sodium 143 133 - 144 mmol/L    Potassium 4.1 3.4 - 5.3 mmol/L    Chloride 113 (H) 94 - 109 mmol/L    Carbon Dioxide 23 20 - 32 mmol/L    Anion Gap 7 3 - 14 mmol/L    Glucose 132 (H) 70 - 99 mg/dL    Urea Nitrogen 17 7 - 30 mg/dL    Creatinine 0.93 0.66 - 1.25 mg/dL    GFR Estimate 86 >60 mL/min/1.7m2    GFR Estimate If Black >90 >60 mL/min/1.7m2     Calcium 8.6 8.5 - 10.1 mg/dL    Bilirubin Total 0.7 0.2 - 1.3 mg/dL    Albumin 3.6 3.4 - 5.0 g/dL    Protein Total 6.8 6.8 - 8.8 g/dL    Alkaline Phosphatase 96 40 - 150 U/L    ALT 22 0 - 70 U/L    AST 26 0 - 45 U/L   Lipid panel reflex to direct LDL Fasting   Result Value Ref [facility]    Cholesterol 134 <200 mg/dL    Triglycerides 153 (H) <150 mg/dL    HDL Cholesterol 42 >39 mg/dL    LDL Cholesterol Calculated 61 <100 mg/dL    Non HDL Cholesterol 92 <130 mg/dL   CK total   Result Value Ref [facility]    CK Total 135 30 - 300 U/L   CBC with platelets   Result Value Ref [facility]    WBC 8.0 4.0 - 11.0 10e9/L    RBC Count 5.13 4.4 - 5.9 10e12/L    Hemoglobin 14.4 13.3 - 17.7 g/dL    Hematocrit 41.2 40.0 - 53.0 %    MCV 80 78 - 100 fl    MCH 28.1 26.5 - 33.0 pg    MCHC 35.0 31.5 - 36.5 g/dL    RDW 13.0 10.0 - 15.0 %    Platelet Count 236 150 - 450 10e9/L   TSH with free T4 reflex   Result Value Ref [facility]    TSH 0.65 0.40 - 4.00 mU/L   Prostate spec antigen screen   Result Value Ref [facility]    PSA 0.84 0 - 4 ug/L   Hemoglobin A1c   Result Value Ref [facility]    Hemoglobin A1C 6.6 (H) 4.3 - 6.0 %   *UA reflex to Microscopic and Culture ([facility] (except [place] and [place])   Result Value Ref [facility]    Color Urine Yellow     Appearance Urine Clear     Glucose Urine Negative NEG^Negative mg/dL    Bilirubin Urine Negative NEG^Negative    Ketones Urine Negative NEG^Negative mg/dL    Specific Gravity Urine >1.030 1.003 - 1.035    Blood Urine Small (A) NEG^Negative    pH Urine 5.0 5.0 - 7.0 pH    Protein Albumin Urine >=300 (A) NEG^Negative mg/dL    Urobilinogen Urine 0.2 0.2 - 1.0 EU/dL    Nitrite Urine Negative NEG^Negative    Leukocyte Esterase Urine Negative NEG^Negative    Source Midstream Urine    Albumin Random Urine Quantitative with Creat Ratio   Result Value Ref [facility]    Creatinine Urine 197 mg/dL    Albumin Urine mg/L 2110 mg/L    Albumin Urine mg/g Cr 1071.07 (H) 0 - 17 mg/g Cr   Urine Microscopic   Result  Value Ref [facility]    WBC Urine O - 2 OTO2^O - 2 /HPF    RBC Urine O - 2 OTO2^O - 2 /HPF    Squamous Epithelial /LPF Urine Few FEW^Few /LPF    Amorphous Crystals Moderate (A) NEG^Negative /HPF        ASSESSMENT/PLAN:                                                        ICD-10-CM    1. Type 2 diabetes mellitus with stable proliferative retinopathy, with long-term current use of insulin, unspecified laterality (H) E11.3559 Comprehensive metabolic panel    Z79.4 Lipid panel reflex to direct LDL Fasting     Hemoglobin A1c     continuous blood glucose monitoring (FREESTYLE CHRISTIANE) sensor     *UA reflex to Microscopic and Culture ([facility] (except [place] and [place])     Albumin Random Urine Quantitative with Creat Ratio     *UA reflex to Microscopic and Culture ([facility] (except [place] and [place])     Albumin Random Urine Quantitative with Creat Ratio     Urine Microscopic     CANCELED: Albumin Random Urine Quantitative with Creat Ratio     CANCELED: *UA reflex to Microscopic and Culture ([facility] (except [place] and [place])   2. Proliferative diabetic retinopathy associated with type 2 diabetes mellitus, unspecified laterality, unspecified proliferative retinopathy type (H) E11.3599 Comprehensive metabolic panel     Lipid panel reflex to direct LDL Fasting     Hemoglobin A1c     *UA reflex to Microscopic and Culture ([facility] (except [place] and [place])     Albumin Random Urine Quantitative with Creat Ratio     *UA reflex to Microscopic and Culture ([facility] (except [place] and [place])     Albumin Random Urine Quantitative with Creat Ratio     CANCELED: Albumin Random Urine Quantitative with Creat Ratio     CANCELED: *UA reflex to Microscopic and Culture ([facility] (except [place] and [place])   3. Hypertension goal BP (blood pressure) < 140/90 I10 Comprehensive metabolic panel      CANCELED: Albumin Random Urine Quantitative with Creat Ratio     CANCELED: *UA reflex to Microscopic and Culture ([facility] (except [place] and [place])   4. Hyperlipidemia with target LDL less than 70 E78.5 Comprehensive metabolic panel     Lipid panel reflex to direct LDL Fasting     CK total     *UA reflex to Microscopic and Culture ([facility] (except [place] and [place])     Albumin Random Urine Quantitative with Creat Ratio     *UA reflex to Microscopic and Culture ([facility] (except [place] and [place])     Albumin Random Urine Quantitative with Creat Ratio   5. Fatigue, unspecified type R53.83 Comprehensive metabolic panel     TSH with free T4 reflex     Vitamin D Deficiency     SLEEP EVALUATION & MANAGEMENT REFERRAL - ADULT -[facility]  [phone] (Age 18 and up)     Testosterone Free and Total     *UA reflex to Microscopic and Culture ([facility] (except [place] and [place])     *UA reflex to Microscopic and Culture ([facility] (except [place] and [place])   6. Mood changes (H) F39 sertraline (ZOLOFT) 100 MG tablet   7. Gastroesophageal reflux disease without esophagitis K21.9 CBC with platelets   8. Obesity (BMI 30.0-34.9) E66.9 TSH with free T4 reflex   9. Medication monitoring encounter Z51.81 Comprehensive metabolic panel     Lipid panel reflex to direct LDL Fasting     CK total     CBC with platelets     TSH with free T4 reflex     Hemoglobin A1c     Vitamin D Deficiency     naproxen sodium (ALEVE) 220 MG tablet     CANCELED: Albumin Random Urine Quantitative with Creat Ratio     CANCELED: *UA reflex to Microscopic and Culture ([facility] (except [place] and [place])   10. Screening for prostate cancer Z12.5 Prostate spec antigen screen     *UA reflex to Microscopic and Culture ([facility] (except [place] and [place])     *UA reflex to  Microscopic and Culture ([place] [facility] (except [place] and [place])   11. Screen for colon cancer Z12.11 Fecal colorectal cancer screen (FIT)        Discussed treatment/modality options, including risk and benefits, he desires further diagnostics, routine labs and medication change (increase Zoloft), await Dr Bustos visit next week. All diagnosis above reviewed and noted above, otherwise stable.  See [facility] orders for further details.  Follow up as needed, every 4-6 months.    Health Maintenance Due   Topic Date Due     PNEUMOVAX 1X HI RISK PATIENT < 65 (NO IB MSG)  03/18/1972     FOOT EXAM Q1 YEAR  02/16/2017     BMP Q1 YR  01/10/2018     MICROALBUMIN Q1 YEAR  01/10/2018     PSA Q1 YR  01/10/2018     WELLNESS VISIT Q1 YR  01/10/2018     A1C Q6 MO  01/18/2018       See Patient Instructions           Pepe Spear MD [address]  [address] [phone] Fax

## 2018-01-30 NOTE — MR AVS SNAPSHOT
After Visit Summary   1/30/2018    Braden De Leon    MRN: [id number]           Patient Information     Date Of Birth          1970        Visit Information        Provider Department      1/30/2018 10:40 AM Pepe Spear MD [facility]        Today's Diagnoses     Type 2 diabetes mellitus with stable proliferative retinopathy, with long-term current use of insulin, unspecified laterality (H)    -  1    Proliferative diabetic retinopathy associated with type 2 diabetes mellitus, unspecified laterality, unspecified proliferative retinopathy type (H)        Hypertension goal BP (blood pressure) < 140/90        Hyperlipidemia with target LDL less than 70        Fatigue, unspecified type        Mood changes (H)        Gastroesophageal reflux disease without esophagitis        Obesity (BMI 30.0-34.9)        Medication monitoring encounter        Screening for prostate cancer        Screen for colon cancer          Care Instructions    Ask your wife to listen if you are snoring or your breathing stops for periods of time at night. Consider scheduling a sleep study.         [facility]                        To reach your care team during and after hours:   [phone]  To reach our pharmacy:        [phone]    Clinic Hours                        Our clinic hours are:    Monday   7:30 am to 7:00 pm                  Tuesday through Friday 7:30 am to 5:00 pm                             Saturday   8:00 am to 12:00 pm      Sunday   Closed      Pharmacy Hours                        Our pharmacy hours are:    Monday   8:30 am to 7:00 pm       Tuesday to Friday  8:30 am to 6:00 pm                       Saturday    9:00 am to 1:00 pm              Sunday    Closed              There is also information available at our web site:  [email or web address]    If your provider ordered any lab tests and you do not receive the results within 10 business days, please call the clinic.    If  you need a medication refill please contact your pharmacy.  Please allow 2-3 business days for your refill to be completed.    Our clinic offers telephone visits and e visits.  Please ask one of your team members to explain more.      Use ShareMem[employer]art (secure email communication and access to your chart) to send your primary care provider a message or make an appointment. Ask someone on your Team how to sign up for [employer].  Immunizations                      Immunization History   Administered Date(s) Administered     TD (ADULT, 7+) 04/01/2001        Health Maintenance                         Health Maintenance Due   Topic Date Due     Pneumovax Vaccine  03/18/1970     Diabetic Foot Exam - yearly  02/16/2017     Basic Metabolic Lab - yearly  01/10/2018     Microalbumin Lab - yearly  01/10/2018     Prostate Test (PSA) - yearly  01/10/2018     Wellness Visit with your Primary Provider - yearly  01/10/2018     A1C (Diabetes) Lab - every 6 months  01/18/2018               Follow-ups after your visit        Additional Services     SLEEP EVALUATION & MANAGEMENT REFERRAL - ADULT -[facility]  [phone] (Age 18 and up)       Please be aware that coverage of these services is subject to the terms and limitations of your health insurance plan.  Call member services at your health plan with any benefit or coverage questions.      Please bring the following to your appointment:    >>   List of current medications   >>   This referral request   >>   Any documents/labs given to you for this referral                      Your next 10 appointments already scheduled     Feb 06, 2018 12:30 PM CST   [facility]    [address] [place] [id number]   [phone]            Mar 27, 2018  9:00 AM CDT   [facility] Visit with [facility]   [facility]    [address] [place] [address]  [address] [id number] [phone]              Future tests that were ordered for you today     Open Future Orders        Priority Expected Expires Ordered    Fecal colorectal cancer screen (FIT) Routine 2/20/2018 4/24/2018 1/30/2018    SLEEP EVALUATION & MANAGEMENT REFERRAL - ADULT -[facility]  [phone] (Age 18 and up) Routine  1/30/2019 1/30/2018            Who to contact     If you have questions or need follow up information about today's clinic visit or your schedule please contact [facility] directly at [phone].  Normal or non-critical lab and imaging results will be communicated to you by MyChart, letter or phone within 4 business days after the clinic has received the results. If you do not hear from us within 7 days, please contact the clinic through [employer] or phone. If you have a critical or abnormal lab result, we will notify you by phone as soon as possible.  Submit refill requests through [employer] or call your pharmacy and they will forward the refill request to us. Please allow 3 business days for your refill to be completed.          Additional Information About Your Visit        [employer] Information     [employer] gives you secure access to your electronic health record. If you see a primary care provider, you can also send messages to your care team and make appointments. If you have questions, please call your primary care clinic.  If you do not have a primary care provider, please call [phone] and they will assist you.        Care EveryWhere ID     This is your Care EveryWhere ID. This could be used by other organizations to access your [facility] medical records  [id number]        Your Vitals Were     Pulse Temperature Height Pulse Oximetry BMI (Body Mass Index)       91 97.9  F (36.6  C) (Oral) 6' (1.829 m) 96% 34.86 kg/m2        Blood Pressure from Last 3 Encounters:   01/30/18 138/88   10/31/17 126/84   06/27/17 142/82     Weight from Last 3 Encounters:   01/30/18 257 lb (116.6 kg)   10/31/17 260 lb (117.9 kg)   06/27/17 255 lb (115.7 kg)              We Performed the Following     *UA reflex to Microscopic and Culture ([facility] (except [place] and [place])     Albumin Random Urine Quantitative with Creat Ratio     CBC with platelets     CK total     Comprehensive metabolic panel     Hemoglobin A1c     Lipid panel reflex to direct LDL Fasting     Prostate spec antigen screen     Testosterone Free and Total     TSH with free T4 reflex     Vitamin D Deficiency          Today's Medication Changes          These changes are accurate as of 1/30/18 11:21 AM.  If you have any questions, ask your nurse or doctor.               Start taking these medicines.        Dose/Directions    continuous blood glucose monitoring sensor   Used for:  Type 2 diabetes mellitus with stable proliferative retinopathy, with long-term current use of insulin, unspecified laterality (H)   Started by:  Pepe Spear MD        For use with Freestyle Armando Flash  for continuous monitioring of blood glucose levels. Replace sensor every 10 days.   Quantity:  9 each   Refills:  3         These medicines have changed or have updated prescriptions.        Dose/Directions    naproxen sodium 220 MG tablet   Commonly known as:  ALEVE   This may have changed:  See the new instructions.   Used for:  Medication monitoring encounter   Changed by:  Pepe Spear MD        Dose:  440 mg   Take 2 tablets (440 mg) by mouth At Bedtime   Quantity:  180 tablet   Refills:  3       sertraline 100 MG tablet   Commonly known as:  ZOLOFT   This may have changed:  See the new instructions.   Used for:  Mood changes (H)   Changed by:  Pepe Spear MD        Dose:  150 mg   Take 1.5 tablets (150 mg) by mouth daily   Quantity:  135 tablet   Refills:  3         Stop taking these medicines if you haven't already. Please contact your care team if you have questions.      ACCU-CHEK JERMAINE PLUS test strip   Generic drug:  blood glucose monitoring   Stopped by:  Pepe Spear MD                Where to get your medicines      These medications were sent to [id number] [address] AT [facility] [address]  [address] [facility] [address]     Phone:  [phone]     sertraline 100 MG tablet         Some of these will need a paper prescription and others can be bought over the counter.  Ask your nurse if you have questions.     You don't need a prescription for these medications     continuous blood glucose monitoring sensor    naproxen sodium 220 MG tablet                Primary Care Provider Office Phone # Fax #    Pepe Spear [phone]       [facility] [address]        Equal Access to Services     LOIS WHELAN : Hadii giuliana kowalski hadasho Soomaali, waaxda luqadaha, qaybta kaalmada adeegyada, nannette lisa . [facility] [phone].    ATENCIÓN: Si habla español, tiene a durham disposición servicios gratuitos de asistencia lingüística. [facility] [phone].    We comply with applicable federal civil rights laws and [place] laws. We do not discriminate on the basis of race, color, national origin, age, disability, sex, sexual orientation, or gender identity.            Thank you!     Thank you for choosing [facility]  for your care. Our goal is always to provide you with excellent care. Hearing back from our patients is one way we can continue to improve our services. Please take a few minutes to complete the written survey that you may receive in the mail after your visit with us. Thank you!             Your Updated Medication List - Protect others around you: Learn how to safely use, store and throw away your medicines at www.disposemymeds.org.          This list is accurate as of 1/30/18 11:21 AM.  Always use your most recent med list.                   Brand  Name Dispense Instructions for use Diagnosis    aspirin 81 MG tablet     30 tablet    Take 1 tablet (81 mg) by mouth daily    Type 2 diabetes mellitus without complication (H)       blood glucose monitoring lancets     2 Box    USE WHEN TESTING BLOOD SUGARS TWICE DAILY.    Type 2 diabetes mellitus without complication (H)       continuous blood glucose monitoring sensor     9 each    For use with Freestyle Armando Flash  for continuous monitioring of blood glucose levels. Replace sensor every 10 days.    Type 2 diabetes mellitus with stable proliferative retinopathy, with long-term current use of insulin, unspecified laterality (H)       glimepiride 4 MG tablet    AMARYL     TK 1/2 TO 1 T PO QPM UTD        HUMALOG KWIKPEN 200 UNIT/ML soln   Generic drug:  Insulin Lispro      Inject 15-20 Units Subcutaneous 3 times daily (before meals)    Type 2 diabetes mellitus with stable proliferative retinopathy, with long-term current use of insulin, unspecified laterality (H)       LEVEMIR FLEXTOUCH 100 UNIT/ML injection   Generic drug:  insulin detemir      INJ 60 UNITS BID        losartan 100 MG tablet    COZAAR    90 tablet    TAKE 1 TABLET BY MOUTH DAILY    Hypertension goal BP (blood pressure) < 140/90, Type 2 diabetes mellitus with stable proliferative retinopathy, with long-term current use of insulin, unspecified laterality (H)       naproxen sodium 220 MG tablet    ALEVE    180 tablet    Take 2 tablets (440 mg) by mouth At Bedtime    Medication monitoring encounter       omeprazole 40 MG capsule    priLOSEC    90 capsule    Take 1 capsule (40 mg) by mouth daily    Gastroesophageal reflux disease without esophagitis       pravastatin 40 MG tablet    PRAVACHOL    90 tablet    TAKE 1 TABLET(40 MG) BY MOUTH DAILY    Hyperlipidemia with target LDL less than 70, Type 2 diabetes mellitus with stable proliferative retinopathy, with long-term current use of insulin, unspecified laterality (H), Proliferative diabetic  retinopathy without macular edema associated with type 2 diabetes mellitus (H)       sertraline 100 MG tablet    ZOLOFT    135 tablet    Take 1.5 tablets (150 mg) by mouth daily    Mood changes (H)       VIAGRA 100 MG tablet   Generic drug:  sildenafil     6 tablet    TAKE 1/2 TO 1 TABLET(50  MG) BY MOUTH DAILY AS NEEDED FOR ERECTILE DYSFUNCTION    Erectile dysfunction, unspecified erectile dysfunction type       VICTOZA PEN 18 MG/3ML soln   Generic drug:  liraglutide

## 2018-01-31 LAB — DEPRECATED CALCIDIOL+CALCIFEROL SERPL-MC: 8 UG/L (ref 20–75)

## 2018-01-31 ASSESSMENT — PATIENT HEALTH QUESTIONNAIRE - PHQ9: SUM OF ALL RESPONSES TO PHQ QUESTIONS 1-9: 8

## 2018-01-31 ASSESSMENT — ANXIETY QUESTIONNAIRES: GAD7 TOTAL SCORE: 4

## 2018-02-03 LAB
SHBG SERPL-SCNC: 30 NMOL/L (ref 11–80)
TESTOST FREE SERPL-MCNC: 8.39 NG/DL (ref 4.7–24.4)
TESTOST SERPL-MCNC: 392 NG/DL (ref 240–950)

## 2018-02-06 ENCOUNTER — OFFICE VISIT (OUTPATIENT)
Dept: ENDOCRINOLOGY | Facility: CLINIC | Age: 48
End: 2018-02-06
Payer: COMMERCIAL

## 2018-02-06 ENCOUNTER — TELEPHONE (OUTPATIENT)
Dept: FAMILY MEDICINE | Facility: CLINIC | Age: 48
End: 2018-02-06

## 2018-02-06 VITALS
BODY MASS INDEX: 34.81 KG/M2 | WEIGHT: 257 LBS | SYSTOLIC BLOOD PRESSURE: 164 MMHG | HEART RATE: 90 BPM | DIASTOLIC BLOOD PRESSURE: 91 MMHG | HEIGHT: 72 IN

## 2018-02-06 DIAGNOSIS — Z79.4 TYPE 2 DIABETES MELLITUS WITH DIABETIC NEUROPATHY, WITH LONG-TERM CURRENT USE OF INSULIN (H): ICD-10-CM

## 2018-02-06 DIAGNOSIS — Z79.4 TYPE 2 DIABETES MELLITUS WITH DIABETIC NEPHROPATHY, WITH LONG-TERM CURRENT USE OF INSULIN (H): ICD-10-CM

## 2018-02-06 DIAGNOSIS — E11.21 TYPE 2 DIABETES MELLITUS WITH DIABETIC NEPHROPATHY, WITH LONG-TERM CURRENT USE OF INSULIN (H): ICD-10-CM

## 2018-02-06 DIAGNOSIS — R80.9 PROTEINURIA: Primary | ICD-10-CM

## 2018-02-06 DIAGNOSIS — E11.3599 TYPE 2 DIABETES MELLITUS WITH PROLIFERATIVE RETINOPATHY WITHOUT MACULAR EDEMA, WITH LONG-TERM CURRENT USE OF INSULIN, UNSPECIFIED LATERALITY (H): Primary | ICD-10-CM

## 2018-02-06 DIAGNOSIS — Z79.4 TYPE 2 DIABETES MELLITUS WITH PROLIFERATIVE RETINOPATHY WITHOUT MACULAR EDEMA, WITH LONG-TERM CURRENT USE OF INSULIN, UNSPECIFIED LATERALITY (H): Primary | ICD-10-CM

## 2018-02-06 DIAGNOSIS — E11.40 TYPE 2 DIABETES MELLITUS WITH DIABETIC NEUROPATHY, WITH LONG-TERM CURRENT USE OF INSULIN (H): ICD-10-CM

## 2018-02-06 PROCEDURE — 99215 OFFICE O/P EST HI 40 MIN: CPT | Performed by: INTERNAL MEDICINE

## 2018-02-06 NOTE — NURSING NOTE
Chief Complaint   Patient presents with     Diabetes       Initial BP (!) 164/91 (BP Location: Right arm, Cuff Size: Adult Regular)  Pulse 90  Ht 6' (1.829 m)  Wt 257 lb (116.6 kg)  BMI 34.86 kg/m2 Estimated body mass index is 34.86 kg/(m^2) as calculated from the following:    Height as of this encounter: 6' (1.829 m).    Weight as of this encounter: 257 lb (116.6 kg).  Medication Reconciliation: rakesh Ernandez

## 2018-02-06 NOTE — PROGRESS NOTES
Name: Braden De Leon is a 47 yr old man with a previous diagnosis of T2DM  Patient previously seen by me at my previous clinic ([facility]), here to establish care with [facility].    Chief Complaint   Patient presents with     Diabetes     HPI:  Recent issues:  Feeling well overall  Recalls recent hgbA1c 6.6% last week at his primary care clinic        1997. Diagnosis of T2DM  Initial treatment with Glucophage x several months, but developed GI symptoms and discontinued  Changed to Glucotrol, then addition of Avandia  3/2005. Saw Dr. СЕРГЕЙ Luevano/Endocrinology  Previous hgbA1c's 8.4-9.1% range  Subsequent addition of basal insulin as QD... Then BID dosing routine  Has used Lantus, Levemir, then Tresiba (QD) and then Levemir.  Tresiba non formulary  Current DM meds:   Levemir 54U sq QAM and 58U sq QPM   Humalog as H40- H20 (-35)- H20 (-25) schedule    Humalog sscale 5U per 50>150    glimeparide 4 mg 1 1/2 tabs QPM    Victoza 1.8 mg sq QD (recalls that his pharmacy/insurance only covers the 1.2 mg dose amount)  Using Accucheck ? Meter, uses occasionally  12/2017 Began use of Freestyle Armando Personal CGMS device, likes it   Recent avg 7d 118, 30d 131 and recalls sensors $25/sensor/10 days   1/30/18 labs include:  hgbA1c 6.6%, Cr 0.93, TSH 0.65, ALT 22, t.chol 134, , HDL 42, LDL 61, t.testosterone 392; urine A/C 1071.07   DM Complications:   Retinopathy    Previous laser PC? And Avastin eye injections    Sees Dr. BRIE Zuñiga/[facility]   Nephropathy    Microalbuminuria   Neuropathy    Decreased sensation vs numbness at feet    , wife Lela.  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 MAI Spear/[facility] for gen med evaluations    PMH/PSH:  Past Medical History:   Diagnosis Date     Benign neoplasm of colon 11/07, 2/12    adenomatous polyps - due 5 yrs     Diabetic macular edema (H)     bilateral - avastatin x 8 -  Dr CANDELARIA Zuñiga     ED (erectile dysfunction)      Esophageal reflux 1995     Esophageal stricture 8/13     Esophagitis 8/13    LA Grade A     Hyperlipidemia LDL goal < 70      Hypertension goal BP (blood pressure) < 140/90 3/09     Migraine 1986    rare migraines 2/yr     Obesity (BMI 30.0-34.9)      Other premature beats 11/07    bigeminy     Proliferative diabetic retinopathy (H) 06/2017    OU - Avastatin - Dr Rosalva Zuñiga     Type 2 diabetes, HbA1c goal < 7% (H) 1992    Dr Bustos     Unspecified gastritis and gastroduodenitis without mention of hemorrhage 1995     Past Surgical History:   Procedure Laterality Date     COLONOSCOPY  11/07, 2/12    polyps x 2 - internal hemorrhoids - adenomatous polyps - due every 5 yrs     COLONOSCOPY N/A 2/21/2017    diverticula x 1 - due 5 yrs     ESOPHAGOSCOPY, GASTROSCOPY, DUODENOSCOPY (EGD), COMBINED  8/20/2013    Esophagitis LA Grade A, esophageal stricture     HC REPAIR ING HERNIA,5+Y/O,REDUCIBL  1978    rt     STRESS ECHO (METRO)  3/12    normal       Family Hx:  Family History   Problem Relation Age of Onset     Hypertension Mother 58     Connective Tissue Disorder Father 70     DIABETES Father 55     Breast Cancer Maternal Grandmother      Respiratory Paternal Grandfather      Colon Cancer No family hx of          Social Hx:  Social History     Social History     Marital status:      Spouse name: Lela     Number of children: 1     Years of education: 13     Occupational History     manage [employer]     Social History Main Topics     Smoking status: Never Smoker     Smokeless tobacco: Never Used     Alcohol use No     Drug use: No     Sexual activity: Yes     Partners: Female     Other Topics Concern      Service Yes     [place]y     Caffeine Concern Yes     24 oz diet     Exercise Yes     3-5/wk, Ref's     Seat Belt Yes     Parent/Sibling W/ Cabg, Mi Or Angioplasty Before 65f 55m? No     Social History Narrative           MEDICATIONS:  has a current medication list which includes the following prescription(s): continuous blood glucose monitoring, naproxen sodium, sertraline, pravastatin, viagra, losartan, insulin lispro, glimepiride, omeprazole, victoza pen, levemir flextouch, blood glucose monitoring, and aspirin.    ROS:     ROS: 10 point ROS neg other than the symptoms noted above in the HPI.    GENERAL: no weight changes, fevers, chills, night sweats.   HEENT: reports good vision acuity; no dysphagia, odonophagia, diplopia  THYROID:  no apparent hyper or hypothyroid symptoms  CV: no chest pain, pressure, palpitations, skipped beats  LUNGS: no SOB, LEAVITT, cough, wheezing   ABDOMEN: no diarrhea, constipation, abdominal pain  EXTREMITIES: no rashes, ulcers, edema  NEUROLOGY: decreased sensation at feet at nighttime; no headaches, denies changes in vision,   MSK: some arthralgias at knees, ankles, back; no apparent weakness  SKIN: no rashes or lesions  PSYCH:  Stable mood, no significant anxiety or depression  ENDOCRINE: no heat or cold intolerance    Physical Exam   VS: BP (!) 164/91 (BP Location: Right arm, Cuff Size: Adult Regular)  Pulse 90  Ht 1.829 m (6')  Wt 116.6 kg (257 lb)  BMI 34.86 kg/m2  GENERAL: AXOX3, NAD, well dressed, answering questions appropriately, appears stated age.  THYROID:  normal gland, no apparent nodules or goiter  HEENT: neck non-tender, no exopthalmous, no proptosis, EOMI, no lig lag or retraction  CV: RRR, no rubs, gallops, no murmurs  LUNGS: CTAB, no wheezes, rales, or ronchi  ABDOMEN: obese, soft, nontender, nondistended, no organomegaly  EXTREMITIES: no edema, +pulses R/L DP4/4 and PT 4/4, no rashes, no lesions  NEUROLOGY: CN grossly intact, somewhat reduced tactile sensation; no tremors  MSK: grossly intact  SKIN: no rashes, no lesions    LABS:    All pertinent notes, labs, and images personally reviewed by me.     A/P:  Encounter Diagnoses   Name Primary?     Type 2 diabetes mellitus with  proliferative retinopathy without macular edema, with long-term current use of insulin, unspecified laterality (H) Yes     Type 2 diabetes mellitus with diabetic nephropathy, with long-term current use of insulin (H)      Type 2 diabetes mellitus with diabetic neuropathy, with long-term current use of insulin (H)      Comments:  I am pleased patient feeling well overall and having success with use of the Freestyle Armando Personal CGMS and glucose control.  Recent hgbA1c excellent but high urine microalbumin level and recent BP level.  Needs more aggressive BP control.    Plan:  Discussed general issues with the diabetes mellitus diagnosis and management  Reviewed the pathophysiology of T2DM   Discussed the [id number] test which reflects previous overall glucose levels or control  Discussed the importance of blood glucose (BG) vs CGMS sensor glucose testing to assess glucose trends  Provided general overview of diabetes (oral and injectable) medication use    Recommend:  Patient Instructions   Reviewed overall DM management plan  Continue Humalog U200 and Levemir insulin pens   Continue current Humalog mealtime dose and sscale   Change Levemir as 54U morning and 50U evening  Watch for nocturnal decline pattern with glucose levels  Consider future discontinuation of the glimeparide medication  Continue same losartan 100 mg daily dose but add lisinopril 5 mg daily dose  Goal BP <130/<80  Updated Victoza 1.8 mg Rx and lisinopril [facility]  Future appts on Tuesdays... Best day available  Call if questions    Keep focus on diet, exercise, weight management  Watch for possible low glucose levels after breakfasttime with his high dose Humalog routine  Advise having fasting lipid panel testing and dilated eye examination, at least annually    Addressed patient questions today    Labs ordered today: No orders of the defined types were placed in this encounter.    Radiology/Consults ordered today:  None    More than 50% of the time spent with Mr. De Leon on counseling / coordinating his care.  Total appointment time was 40 minutes.      Follow-up:  3 mo.    Vic Bustos MD  Endocrinology  [facility]  CC: Pepe Spear

## 2018-02-06 NOTE — MR AVS SNAPSHOT
After Visit Summary   2/6/2018    Braden De Leon    MRN: [id number]           Patient Information     Date Of Birth          1970        Visit Information        Provider Department      2/6/2018 12:30 PM Vic Bustos MD [facility]        Today's Diagnoses     Type 2 diabetes mellitus with proliferative retinopathy without macular edema, with long-term current use of insulin, unspecified laterality (H)    -  1    Type 2 diabetes mellitus with diabetic nephropathy, with long-term current use of insulin (H)          Care Instructions    Reviewed overall DM management plan  Continue Humalog U200 and Levemir insulin pens   Continue current Humalog mealtime dose and sscale   Change Levemir as 54U morning and 50U evening  Watch for nocturnal decline pattern with glucose levels  Consider future discontinuation of the glimeparide medication  Continue same losartan 100 mg daily dose but add lisinopril 5 mg daily dose  Goal BP <130/<80  Updated Victoza 1.8 mg Rx and lisinopril sent to [address]/[place]  Future appts on Tuesdays... Best day available  Call if questions            Follow-ups after your visit        Your next 10 appointments already scheduled     Mar 27, 2018  9:00 AM CDT   [facility] Visit with Vic Bustos MD   [facility]    [address] [place] [address] [phone]              Who to contact     If you have questions or need follow up information about today's clinic visit or your schedule please contact [facility] directly at [phone].  Normal or non-critical lab and imaging results will be communicated to you by MyChart, letter or phone within 4 business days after the clinic has received the results. If you do not hear from us within 7 days, please contact the clinic through MyChart or phone. If you have a critical or abnormal lab result, we will notify you by phone as soon as  possible.  Submit refill requests through M-Files or call your pharmacy and they will forward the refill request to us. Please allow 3 business days for your refill to be completed.          Additional Information About Your Visit        Bettyvisionhart Information     M-Files gives you secure access to your electronic health record. If you see a primary care provider, you can also send messages to your care team and make appointments. If you have questions, please call your primary care clinic.  If you do not have a primary care provider, please call [phone] and they will assist you.        Care EveryWhere ID     This is your Care EveryWhere ID. This could be used by other organizations to access your [facility] medical records  [id number]        Your Vitals Were     Pulse Height BMI (Body Mass Index)             90 1.829 m (6') 34.86 kg/m2          Blood Pressure from Last 3 Encounters:   02/06/18 (!) 164/91   01/30/18 138/88   10/31/17 126/84    Weight from Last 3 Encounters:   02/06/18 116.6 kg (257 lb)   01/30/18 116.6 kg (257 lb)   10/31/17 117.9 kg (260 lb)              Today, you had the following     No orders found for display       Primary Care Provider Office Phone # Fax #    Pepe Spear [phone] [address] [facility] [address]        Equal Access to Services     [facility] CLEOPATRA : Hadii aad ku hadasho Soomaali, waaxda luqadaha, qaybta kaalmada adeegyada, nannette tuttle. So [facility] [phone].    ATENCIÓN: Si habla español, tiene a durham disposición servicios gratuitos de asistencia lingüística. Bernadine al [phone].    We comply with applicable federal civil rights laws and [place] laws. We do not discriminate on the basis of race, color, national origin, age, disability, sex, sexual orientation, or gender identity.            Thank you!     Thank you for choosing [facility]  for your care. Our goal is always to provide you with  excellent care. Hearing back from our patients is one way we can continue to improve our services. Please take a few minutes to complete the written survey that you may receive in the mail after your visit with us. Thank you!             Your Updated Medication List - Protect others around you: Learn how to safely use, store and throw away your medicines at www.disposemymeds.org.          This list is accurate as of 2/6/18  1:31 PM.  Always use your most recent med list.                   Brand Name Dispense Instructions for use Diagnosis    aspirin 81 MG tablet     30 tablet    Take 1 tablet (81 mg) by mouth daily    Type 2 diabetes mellitus without complication (H)       blood glucose monitoring lancets     2 Box    USE WHEN TESTING BLOOD SUGARS TWICE DAILY.    Type 2 diabetes mellitus without complication (H)       continuous blood glucose monitoring sensor     9 each    For use with Freestyle Armando Flash  for continuous monitioring of blood glucose levels. Replace sensor every 10 days.    Type 2 diabetes mellitus with stable proliferative retinopathy, with long-term current use of insulin, unspecified laterality (H)       glimepiride 4 MG tablet    AMARYL     TK 1/2 TO 1 T PO QPM UTD        HUMALOG KWIKPEN 200 UNIT/ML soln   Generic drug:  Insulin Lispro      Inject 15-20 Units Subcutaneous 3 times daily (before meals)    Type 2 diabetes mellitus with stable proliferative retinopathy, with long-term current use of insulin, unspecified laterality (H)       LEVEMIR FLEXTOUCH 100 UNIT/ML injection   Generic drug:  insulin detemir      INJ 60 UNITS BID        losartan 100 MG tablet    COZAAR    90 tablet    TAKE 1 TABLET BY MOUTH DAILY    Hypertension goal BP (blood pressure) < 140/90, Type 2 diabetes mellitus with stable proliferative retinopathy, with long-term current use of insulin, unspecified laterality (H)       naproxen sodium 220 MG tablet    ALEVE    180 tablet    Take 2 tablets (440 mg) by mouth  At Bedtime    Medication monitoring encounter       omeprazole 40 MG capsule    priLOSEC    90 capsule    Take 1 capsule (40 mg) by mouth daily    Gastroesophageal reflux disease without esophagitis       pravastatin 40 MG tablet    PRAVACHOL    90 tablet    TAKE 1 TABLET(40 MG) BY MOUTH DAILY    Hyperlipidemia with target LDL less than 70, Type 2 diabetes mellitus with stable proliferative retinopathy, with long-term current use of insulin, unspecified laterality (H), Proliferative diabetic retinopathy without macular edema associated with type 2 diabetes mellitus (H)       sertraline 100 MG tablet    ZOLOFT    135 tablet    Take 1.5 tablets (150 mg) by mouth daily    Mood changes (H)       VIAGRA 100 MG tablet   Generic drug:  sildenafil     6 tablet    TAKE 1/2 TO 1 TABLET(50  MG) BY MOUTH DAILY AS NEEDED FOR ERECTILE DYSFUNCTION    Erectile dysfunction, unspecified erectile dysfunction type       VICTOZA PEN 18 MG/3ML soln   Generic drug:  liraglutide

## 2018-02-06 NOTE — TELEPHONE ENCOUNTER
Reason for Call:  Other returning call    Detailed comments: Pt just missed call,  Pt calling back    Phone Number Patient can be reached at: Home number on file [phone] (home)    Best Time: anytime    Can we leave a detailed message on this number? YES    Call taken on 2/6/2018 at 9:25 AM by Tracy Lemus

## 2018-02-06 NOTE — PATIENT INSTRUCTIONS
Reviewed overall DM management plan  Continue Humalog U200 and Levemir insulin pens   Continue current Humalog mealtime dose and sscale   Change Levemir as 54U morning and 50U evening  Watch for nocturnal decline pattern with glucose levels  Consider future discontinuation of the glimeparide medication  Continue same losartan 100 mg daily dose but add lisinopril 5 mg daily dose  Goal BP <130/<80  Updated Victoza 1.8 mg Rx and lisinopril sent to [address]/[place]  Future appts on Tuesdays... Best day available  Call if questions

## 2018-02-06 NOTE — LETTER
2/6/2018         RE: Braden De Leon  [address] [place] [address]        Dear Colleague,    Thank you for referring your patient, Braden De Leon, to the [facility]. Please see a copy of my visit note below.    Name: Braden De Leon is a 47 yr old man with a previous diagnosis of T2DM  Patient previously seen by me at my previous clinic ([facility]), here to establish care with [facility].    Chief Complaint   Patient presents with     Diabetes     HPI:  Recent issues:  Feeling well overall  Recalls recent hgbA1c 6.6% last week at his primary care clinic        1997. Diagnosis of T2DM  Initial treatment with Glucophage x several months, but developed GI symptoms and discontinued  Changed to Glucotrol, then addition of Avandia  3/2005. Saw Dr. СЕРГЕЙ Luevano/Endocrinology  Previous hgbA1c's 8.4-9.1% range  Subsequent addition of basal insulin as QD... Then BID dosing routine  Has used Lantus, Levemir, then Tresiba (QD) and then Levemir.  Tresiba non formulary  Current DM meds:   Levemir 54U sq QAM and 58U sq QPM   Humalog as H40- H20 (-35)- H20 (-25) schedule    Humalog sscale 5U per 50>150    glimeparide 4 mg 1 1/2 tabs QPM    Victoza 1.8 mg sq QD (recalls that his pharmacy/insurance only covers the 1.2 mg dose amount)  Using Accucheck ? Meter, uses occasionally  12/2017 Began use of Freestyle Armando Personal CGMS device, likes it   Recent avg 7d 118, 30d 131 and recalls sensors $25/sensor/10 days   1/30/18 labs include:  hgbA1c 6.6%, Cr 0.93, TSH 0.65, ALT 22, t.chol 134, , HDL 42, LDL 61, t.testosterone 392; urine A/C 1071.07   DM Complications:   Retinopathy    Previous laser PC? And Avastin eye injections    Sees Dr. BRIE Zuñiga/[facility]   Nephropathy    Microalbuminuria   Neuropathy    Decreased sensation vs numbness at feet    , wife Lela.  He works at [employer]   Sees  Dr. MAI Spear/ [facility] for gen med evaluations    PMH/PSH:  Past Medical History:   Diagnosis Date     Benign neoplasm of colon 11/07, 2/12    adenomatous polyps - due 5 yrs     Diabetic macular edema (H)     bilateral - avastatin x 8 - Dr CANDELARIA Zuñiga     ED (erectile dysfunction)      Esophageal reflux 1995     Esophageal stricture 8/13     Esophagitis 8/13    LA Grade A     Hyperlipidemia LDL goal < 70      Hypertension goal BP (blood pressure) < 140/90 3/09     Migraine 1986    rare migraines 2/yr     Obesity (BMI 30.0-34.9)      Other premature beats 11/07    bigeminy     Proliferative diabetic retinopathy (H) 06/2017    OU - Avastatin - Dr Rosalva Zuñiga     Type 2 diabetes, HbA1c goal < 7% (H) 1992    Dr Bustos     Unspecified gastritis and gastroduodenitis without mention of hemorrhage 1995     Past Surgical History:   Procedure Laterality Date     COLONOSCOPY  11/07, 2/12    polyps x 2 - internal hemorrhoids - adenomatous polyps - due every 5 yrs     COLONOSCOPY N/A 2/21/2017    diverticula x 1 - due 5 yrs     ESOPHAGOSCOPY, GASTROSCOPY, DUODENOSCOPY (EGD), COMBINED  8/20/2013    Esophagitis LA Grade A, esophageal stricture     HC REPAIR ING HERNIA,5+Y/O,REDUCIBL  1978    rt     STRESS ECHO (METRO)  3/12    normal       Family Hx:  Family History   Problem Relation Age of Onset     Hypertension Mother 58     Connective Tissue Disorder Father 70     DIABETES Father 55     Breast Cancer Maternal Grandmother      Respiratory Paternal Grandfather      Colon Cancer No family hx of          Social Hx:  Social History     Social History     Marital status:      Spouse name: Lela     Number of children: 1     Years of education: 13     Occupational History     manage [employer]     Social History Main Topics     Smoking status: Never Smoker     Smokeless tobacco: Never Used     Alcohol use No     Drug use: No     Sexual activity: Yes     Partners: Female     Other Topics Concern       Service Yes     Kiel     Caffeine Concern Yes     24 oz diet     Exercise Yes     3-5/wk, Ref's     Seat Belt Yes     Parent/Sibling W/ Cabg, Mi Or Angioplasty Before 65f 55m? No     Social History Narrative          MEDICATIONS:  has a current medication list which includes the following prescription(s): continuous blood glucose monitoring, naproxen sodium, sertraline, pravastatin, viagra, losartan, insulin lispro, glimepiride, omeprazole, victoza pen, levemir flextouch, blood glucose monitoring, and aspirin.    ROS:     ROS: 10 point ROS neg other than the symptoms noted above in the HPI.    GENERAL: no weight changes, fevers, chills, night sweats.   HEENT: reports good vision acuity; no dysphagia, odonophagia, diplopia  THYROID:  no apparent hyper or hypothyroid symptoms  CV: no chest pain, pressure, palpitations, skipped beats  LUNGS: no SOB, LEAVITT, cough, wheezing   ABDOMEN: no diarrhea, constipation, abdominal pain  EXTREMITIES: no rashes, ulcers, edema  NEUROLOGY: decreased sensation at feet at nighttime; no headaches, denies changes in vision,   MSK: some arthralgias at knees, ankles, back; no apparent weakness  SKIN: no rashes or lesions  PSYCH:  Stable mood, no significant anxiety or depression  ENDOCRINE: no heat or cold intolerance    Physical Exam   VS: BP (!) 164/91 (BP Location: Right arm, Cuff Size: Adult Regular)  Pulse 90  Ht 1.829 m (6')  Wt 116.6 kg (257 lb)  BMI 34.86 kg/m2  GENERAL: AXOX3, NAD, well dressed, answering questions appropriately, appears stated age.  THYROID:  normal gland, no apparent nodules or goiter  HEENT: neck non-tender, no exopthalmous, no proptosis, EOMI, no lig lag or retraction  CV: RRR, no rubs, gallops, no murmurs  LUNGS: CTAB, no wheezes, rales, or ronchi  ABDOMEN: obese, soft, nontender, nondistended, no organomegaly  EXTREMITIES: no edema, +pulses R/L DP4/4 and PT 4/4, no rashes, no lesions  NEUROLOGY: CN grossly intact, somewhat reduced tactile  sensation; no tremors  MSK: grossly intact  SKIN: no rashes, no lesions    LABS:    All pertinent notes, labs, and images personally reviewed by me.     A/P:  Encounter Diagnoses   Name Primary?     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  Recent hgbA1c excellent but high urine microalbumin level and recent BP level.  Needs more aggressive BP control.    Plan:  Discussed general issues with the diabetes mellitus diagnosis and management  Reviewed the pathophysiology of T2DM   Discussed the rseplplbsvT1x test which reflects previous overall glucose levels or control  Discussed the importance of blood glucose (BG) vs CGMS sensor glucose testing to assess glucose trends  Provided general overview of diabetes (oral and injectable) medication use    Recommend:  Patient Instructions   Reviewed overall DM management plan  Continue Humalog U200 and Levemir insulin pens   Continue current Humalog mealtime dose and sscale   Change Levemir as 54U morning and 50U evening  Watch for nocturnal decline pattern with glucose levels  Consider future discontinuation of the glimeparide medication  Continue same losartan 100 mg daily dose but add lisinopril 5 mg daily dose  Goal BP <130/<80  Updated Victoza 1.8 mg Rx and lisinopril sent to [facility]  Future appts on Tuesdays... Best day available  Call if questions    Keep focus on diet, exercise, weight management  Watch for possible low glucose levels after breakfasttime with his high dose Humalog routine  Advise having fasting lipid  panel testing and dilated eye examination, at least annually    Addressed patient questions today    Labs ordered today: No orders of the defined types were placed in this encounter.    Radiology/Consults ordered today: None    More than 50% of the time spent with Mr. De Leon on counseling / coordinating his care.  Total appointment time was 40 minutes.      Follow-up:  3 mo.    Vic Bustos MD  Endocrinology  [facility]  CC: Pepe Spear       Again, thank you for allowing me to participate in the care of your patient.        Sincerely,        Vic Bustos MD

## 2018-02-06 NOTE — TELEPHONE ENCOUNTER
Patient notified with providers response below and agrees with plan. Per pt, referral info sent to pt's MyChart as he was driving during phone call.  Kristin Silva RN  [employer]

## 2018-02-07 DIAGNOSIS — R80.9 PROTEINURIA: Primary | ICD-10-CM

## 2018-02-19 ENCOUNTER — MYC MEDICAL ADVICE (OUTPATIENT)
Dept: ENDOCRINOLOGY | Facility: CLINIC | Age: 48
End: 2018-02-19

## 2018-02-19 DIAGNOSIS — I10 ESSENTIAL HYPERTENSION: Primary | ICD-10-CM

## 2018-02-20 RX ORDER — LISINOPRIL 5 MG/1
5 TABLET ORAL DAILY
Qty: 90 TABLET | Refills: 3 | Status: SHIPPED | OUTPATIENT
Start: 2018-02-20 | End: 2018-10-09

## 2018-03-10 ENCOUNTER — TELEPHONE (OUTPATIENT)
Dept: ENDOCRINOLOGY | Facility: CLINIC | Age: 48
End: 2018-03-10

## 2018-03-10 DIAGNOSIS — Z79.4 TYPE 2 DIABETES MELLITUS WITH MODERATE NONPROLIFERATIVE RETINOPATHY, WITH LONG-TERM CURRENT USE OF INSULIN, MACULAR EDEMA PRESENCE UNSPECIFIED, UNSPECIFIED LATERALITY (H): ICD-10-CM

## 2018-03-10 DIAGNOSIS — E11.3399 TYPE 2 DIABETES MELLITUS WITH MODERATE NONPROLIFERATIVE RETINOPATHY, WITH LONG-TERM CURRENT USE OF INSULIN, MACULAR EDEMA PRESENCE UNSPECIFIED, UNSPECIFIED LATERALITY (H): ICD-10-CM

## 2018-03-10 DIAGNOSIS — E11.9 TYPE 2 DIABETES, HBA1C GOAL < 7% (H): Primary | ICD-10-CM

## 2018-03-10 NOTE — TELEPHONE ENCOUNTER
"Last Written Prescription Date:  ?  Last Fill Quantity: ?,   # refills: ?  Last Office Visit: 2/06/18 ([place])  Future Office visit:    Next 5 appointments (look out 90 days)     Mar 27, 2018  9:00 AM CDT   Return Visit with Vic Bustos MD   [facility]    [address]  [facility] [address]   [phone]            May 08, 2018  9:30 AM CDT   Return Visit with Vic Bustos MD   [facility] ([facility])    [address]  [facility] [address]   [phone]                   Routing refill request to provider for review/approval because:  Medication is historical    Requested Prescriptions   Pending Prescriptions Disp Refills     VICTOZA PEN 18 MG/3ML soln  12    GLP-1 Agonists Protocol Failed    3/10/2018 11:38 AM       Failed - Blood pressure less than 140/90 in past 6 months    BP Readings from Last 3 Encounters:   02/06/18 (!) 164/91   01/30/18 138/88   10/31/17 126/84                Passed - LDL on file in past 12 months    Recent Labs   Lab Test  01/30/18   1110   LDL  61            Passed - Microalbumin on file in past 12 months    Recent Labs   Lab Test  01/30/18   1131   MICROL  2110   UMALCR  1071.07*            Passed - HgbA1C in past 3 or 6 months    Recent Labs   Lab Test  01/30/18   1110   A1C  6.6*            Passed - Patient is age 18 or older       Passed - Normal serum creatinine on file in past 12 months    Recent Labs   Lab Test  01/30/18   1110   CR  0.93            Passed - Recent (6 mo) or future (30 days) visit within the authorizing provider's specialty    Patient had office visit in the last 6 months or has a visit in the next 30 days with authorizing provider.  See \"Patient Info\" tab in inbasket, or \"Choose Columns\" in Meds & Orders section of the refill encounter.              "

## 2018-03-13 RX ORDER — LIRAGLUTIDE 6 MG/ML
1.8 INJECTION SUBCUTANEOUS EVERY MORNING
Qty: 9 ML | Refills: 11 | Status: SHIPPED | OUTPATIENT
Start: 2018-03-13 | End: 2019-10-26

## 2018-03-13 NOTE — TELEPHONE ENCOUNTER
Dr. Bustos.    Routing refill request to provider for review/approval because:  Medication is reported/historical, Per your scanned visit notes from 7/18/17 pt was on 1.8 mg QAM Victoza.    Pended 30 day supply. Please approve if appropriate  Tierney Scott RN

## 2018-04-09 NOTE — TELEPHONE ENCOUNTER
Charisse calling back saying that the pt wants 2 pens of the Victoza  instead of 3 Victoza Pens  They need a New rx for this  Charisse  [phone]    Thank you

## 2018-04-09 NOTE — TELEPHONE ENCOUNTER
I called and gave verbal to pharmacist to dispense 2 pens instead of 3 pens Victoza at patient's request.    Audrey Pratt RN

## 2018-04-22 DIAGNOSIS — E78.5 HYPERLIPIDEMIA WITH TARGET LDL LESS THAN 70: ICD-10-CM

## 2018-04-22 DIAGNOSIS — E11.3559 TYPE 2 DIABETES MELLITUS WITH STABLE PROLIFERATIVE RETINOPATHY, WITH LONG-TERM CURRENT USE OF INSULIN, UNSPECIFIED LATERALITY (H): ICD-10-CM

## 2018-04-22 DIAGNOSIS — Z79.4 TYPE 2 DIABETES MELLITUS WITH STABLE PROLIFERATIVE RETINOPATHY, WITH LONG-TERM CURRENT USE OF INSULIN, UNSPECIFIED LATERALITY (H): ICD-10-CM

## 2018-04-22 DIAGNOSIS — E11.3599 PROLIFERATIVE DIABETIC RETINOPATHY WITHOUT MACULAR EDEMA ASSOCIATED WITH TYPE 2 DIABETES MELLITUS (H): ICD-10-CM

## 2018-04-23 RX ORDER — PRAVASTATIN SODIUM 40 MG
TABLET ORAL
Qty: 90 TABLET | Refills: 1 | Status: SHIPPED | OUTPATIENT
Start: 2018-04-23 | End: 2018-11-30

## 2018-04-23 NOTE — TELEPHONE ENCOUNTER
"Requested Prescriptions   Pending Prescriptions Disp Refills     pravastatin (PRAVACHOL) 40 MG tablet [Pharmacy Med Name: PRAVASTATIN 40MG TABLETS] 90 tablet 0        Last Written Prescription Date:  1.26.18  Last Fill Quantity: 90,  # refills: 0   Last office visit: 1/30/2018 with prescribing provider:     Future Office Visit:   Next 5 appointments (look out 90 days)     May 08, 2018  9:30 AM CDT   Return Visit with Vic Bustos MD   [facility] ([address] [id number] [phone]                  Sig: TAKE 1 TABLET(40 MG) BY MOUTH DAILY    Statins Protocol Passed    4/22/2018  3:43 AM       Passed - LDL on file in past 12 months    Recent Labs   Lab Test  01/30/18   1110   LDL  61            Passed - No abnormal creatine kinase in past 12 months    Recent Labs   Lab Test  01/30/18   1110   CKT  135               Passed - Recent (12 mo) or future (30 days) visit within the authorizing provider's specialty    Patient had office visit in the last 12 months or has a visit in the next 30 days with authorizing provider or within the authorizing provider's specialty.  See \"Patient Info\" tab in inbasket, or \"Choose Columns\" in Meds & Orders section of the refill encounter.           Passed - Patient is age 18 or older          "

## 2018-04-25 ENCOUNTER — TELEPHONE (OUTPATIENT)
Dept: ENDOCRINOLOGY | Facility: CLINIC | Age: 48
End: 2018-04-25

## 2018-04-25 DIAGNOSIS — E11.3559 TYPE 2 DIABETES MELLITUS WITH STABLE PROLIFERATIVE RETINOPATHY, WITH LONG-TERM CURRENT USE OF INSULIN, UNSPECIFIED LATERALITY (H): ICD-10-CM

## 2018-04-25 DIAGNOSIS — Z79.4 TYPE 2 DIABETES MELLITUS WITH STABLE PROLIFERATIVE RETINOPATHY, WITH LONG-TERM CURRENT USE OF INSULIN, UNSPECIFIED LATERALITY (H): ICD-10-CM

## 2018-04-25 NOTE — TELEPHONE ENCOUNTER
Humalog Kwikpen is historical in patient's chart with Sig: Inject 15-20 Units Subcutaneous 3 times daily (before meals)    Fax from [facility] pharmacy states SIG inject 20 units SQ every morning, 5 units with snacks, 15 units at noon, and 17 units in evening along with sliding scale.   Last dispensed 2-23-18  #30    What is correct dose?    LOV Dr Bustos 2-6-18    Next 5 appointments (look out 90 days)     May 08, 2018  9:30 AM CDT   [facility] Visit with Vic [facility]os MD   [facility]    [address] [place] [address] [phone]                RT Sera (R)

## 2018-04-25 NOTE — TELEPHONE ENCOUNTER
Dr. Bustos-    Please review message below:     Per Last OV notes: Continue current Humalog mealtime dose and sscale    Is the correct dosing: inject 20 units SQ every morning, 5 units with snacks, 15 units at noon, and 17 units in evening along with sliding scale    Can this be updated in MAR?      Thank you!   Olivia KANG RN

## 2018-04-26 NOTE — TELEPHONE ENCOUNTER
I called patient and spoke with him.   Has 2 concerns:    1.) He reports taking Humalog 40 mg units in morning, 20 units at noon, and 20 units in evening according to sliding scale.   Patient reports that he will be out of medication TOMORROW. I explained that I need to send this along to Dr. Bustos since it is reported as HISTORICAL on medication list. However, Dr. Bustos is not in clinic today.  Also explained that pharmacy are willing to dispense a 2-3 day supply if patients are going to run out of medication. Dr. Bustos, please see pended medication.     2.) Additionally, patient is scheduled 5/8/18 (Tuesday) for follow up with Dr. Bustos. Patient is requesting to change this appointment to either Monday (5/7/18) or Monday (5/14/18). Patient does not work on Mondays so prefers to be see on Monday. Olena can you please assist patient with scheduling on either of these days?    Audrey Pratt RN

## 2018-04-26 NOTE — TELEPHONE ENCOUNTER
Reason for Call:  Other prescription    Detailed comments: patient calling to check on status of humalog refill, he will be out by tomorrow.     Phone Number Patient can be reached at: Home number on file [phone] (home)    Best Time: asap    Can we leave a detailed message on this number? YES    Call taken on 4/26/2018 at 3:07 PM by Tracy Diane

## 2018-04-26 NOTE — TELEPHONE ENCOUNTER
Message noted.  I have now signed/sent the Humalog pen Rx as requested.  Also, I can see him for a f/u workin appt at 12:30pm on 5/14/18 at our [facility] office.  Please relay message and then create the appt on my schedule.  Thanks.    MAI Bustos MD  [facility]

## 2018-05-14 ENCOUNTER — OFFICE VISIT (OUTPATIENT)
Dept: ENDOCRINOLOGY | Facility: CLINIC | Age: 48
End: 2018-05-14
Payer: COMMERCIAL

## 2018-05-14 VITALS
HEART RATE: 82 BPM | DIASTOLIC BLOOD PRESSURE: 83 MMHG | SYSTOLIC BLOOD PRESSURE: 141 MMHG | HEIGHT: 72 IN | WEIGHT: 259 LBS | BODY MASS INDEX: 35.08 KG/M2

## 2018-05-14 DIAGNOSIS — R80.9 TYPE 2 DIABETES MELLITUS WITH MICROALBUMINURIA, WITH LONG-TERM CURRENT USE OF INSULIN (H): ICD-10-CM

## 2018-05-14 DIAGNOSIS — E11.311 DIABETIC MACULAR EDEMA (H): ICD-10-CM

## 2018-05-14 DIAGNOSIS — E11.9 TYPE 2 DIABETES MELLITUS WITHOUT COMPLICATION, WITH LONG-TERM CURRENT USE OF INSULIN (H): Primary | ICD-10-CM

## 2018-05-14 DIAGNOSIS — Z79.4 TYPE 2 DIABETES MELLITUS WITHOUT COMPLICATION, WITH LONG-TERM CURRENT USE OF INSULIN (H): Primary | ICD-10-CM

## 2018-05-14 DIAGNOSIS — E11.29 TYPE 2 DIABETES MELLITUS WITH MICROALBUMINURIA, WITH LONG-TERM CURRENT USE OF INSULIN (H): ICD-10-CM

## 2018-05-14 DIAGNOSIS — Z79.4 TYPE 2 DIABETES MELLITUS WITH MICROALBUMINURIA, WITH LONG-TERM CURRENT USE OF INSULIN (H): ICD-10-CM

## 2018-05-14 DIAGNOSIS — Z79.4 TYPE 2 DIABETES MELLITUS WITH STABLE PROLIFERATIVE RETINOPATHY, WITH LONG-TERM CURRENT USE OF INSULIN, UNSPECIFIED LATERALITY (H): Primary | ICD-10-CM

## 2018-05-14 DIAGNOSIS — E11.3559 TYPE 2 DIABETES MELLITUS WITH STABLE PROLIFERATIVE RETINOPATHY, WITH LONG-TERM CURRENT USE OF INSULIN, UNSPECIFIED LATERALITY (H): Primary | ICD-10-CM

## 2018-05-14 PROBLEM — E11.3593 PROLIFERATIVE DIABETIC RETINOPATHY OF BOTH EYES WITHOUT MACULAR EDEMA ASSOCIATED WITH TYPE 2 DIABETES MELLITUS (H): Status: RESOLVED | Noted: 2017-06-27 | Resolved: 2018-05-14

## 2018-05-14 LAB — HBA1C MFR BLD: 6.7 % (ref 0–5.6)

## 2018-05-14 PROCEDURE — 80048 BASIC METABOLIC PNL TOTAL CA: CPT | Performed by: INTERNAL MEDICINE

## 2018-05-14 PROCEDURE — 36415 COLL VENOUS BLD VENIPUNCTURE: CPT | Performed by: INTERNAL MEDICINE

## 2018-05-14 PROCEDURE — 83036 HEMOGLOBIN GLYCOSYLATED A1C: CPT | Performed by: INTERNAL MEDICINE

## 2018-05-14 PROCEDURE — 82043 UR ALBUMIN QUANTITATIVE: CPT | Performed by: INTERNAL MEDICINE

## 2018-05-14 PROCEDURE — 99214 OFFICE O/P EST MOD 30 MIN: CPT | Performed by: INTERNAL MEDICINE

## 2018-05-14 RX ORDER — INSULIN DETEMIR 100 [IU]/ML
INJECTION, SOLUTION SUBCUTANEOUS
Qty: 45 ML | Refills: 11 | Status: SHIPPED | OUTPATIENT
Start: 2018-05-14 | End: 2019-05-21

## 2018-05-14 NOTE — TELEPHONE ENCOUNTER
Reason for Call:  Medication or medication refill:    Do you use a [facility]?  Name of the pharmacy and phone number for the current request:  [facility] [id number] [place] [address] - [facility] [address] [facility] [address]      Name of the medication requested: LEVEMIR FLEXTOUCH 100 UNIT/ML soln       Other request:       Can we leave a detailed message on this number? YES    Phone number patient can be reached at: Home number on file [phone] (home)    Best Time: any    Call taken on 5/14/2018 at 2:45 PM by Manasa Badillo

## 2018-05-14 NOTE — PROGRESS NOTES
Name: Braden De Leon      Chief Complaint   Patient presents with     Diabetes     HPI:  Recent issues:  Here for f/u diabetes evaluation.    Feeling well overall        1997. Diagnosis of T2DM  Initial treatment with Glucophage x several months, but developed GI symptoms and discontinued  Changed to Glucotrol, then addition of Avandia  3/2005. Saw Dr. СЕРГЕЙ Luevano/Endocrinology  Previous hgbA1c's 8.4-9.1% range  Subsequent addition of basal insulin as QD... Then BID dosing routine  Has used Lantus, Levemir, then Tresiba (QD) and then Levemir.  Tresiba non formulary  Current DM meds:   Levemir 54U sq QAM and 58U sq QPM   Humalog as H40- H20 (-35)- H20 (-25) schedule    Humalog sscale 5U per 50>150    glimeparide 4 mg 1 1/2 tabs QPM    Victoza 1.8 mg sq QD (recalls that his pharmacy/insurance only covers the 1.2 mg dose amount)  Using [employer] Manager Software ? Meter, uses occasionally  12/2017 Began use of Freestyle Armando Personal CGMS device, likes it   Recent avg 7d 156, 30d 140 and recalls sensors $25/sensor/10 days   Previous [facility] labs include:  Lab Results   Component Value Date    A1C 6.6 (H) 01/30/2018     01/30/2018    POTASSIUM 4.1 01/30/2018    CHLORIDE 113 (H) 01/30/2018    CO2 23 01/30/2018    ANIONGAP 7 01/30/2018     (H) 01/30/2018    BUN 17 01/30/2018    CR 0.93 01/30/2018    GFRESTIMATED 86 01/30/2018    GFRESTBLACK >90 01/30/2018    SILVIANO 8.6 01/30/2018    CHOL 134 01/30/2018    TRIG 153 (H) 01/30/2018    HDL 42 01/30/2018    LDL 61 01/30/2018    NHDL 92 01/30/2018    UCRR 197 01/30/2018    MICROL 2110 01/30/2018    UMALCR 1071.07 (H) 01/30/2018     DM Complications:   Retinopathy    Previous laser PC? and Avastin eye injections OU    Sees Dr. BRIE Zuñiga/[facility]   Nephropathy    Microalbuminuria    Taking losartan and lisinopril medications   Neuropathy    Decreased sensation vs numbness at feet    , wife Lela.  He works at [employer]   Sees Dr. ONEILL  Rainer/[facility] for gen med evaluations    PMH/PSH:  Past Medical History:   Diagnosis Date     Benign neoplasm of colon 11/07, 2/12    adenomatous polyps - due 5 yrs     Diabetic macular edema (H)     bilateral - avastatin x 8 - Dr CANDELARIA Zuñiga     ED (erectile dysfunction)      Esophageal reflux 1995     Esophageal stricture 8/13     Esophagitis 8/13    LA Grade A     Hyperlipidemia LDL goal < 70      Hypertension goal BP (blood pressure) < 140/90 3/09     Migraine 1986    rare migraines 2/yr     Obesity (BMI 30.0-34.9)      Other premature beats 11/07    bigeminy     Proliferative diabetic retinopathy (H) 06/2017    OU - Avastatin - Dr Rosalva Zuñiga     Type 2 diabetes, HbA1c goal < 7% (H) 1992    Dr Bustos     Unspecified gastritis and gastroduodenitis without mention of hemorrhage 1995     Past Surgical History:   Procedure Laterality Date     COLONOSCOPY  11/07, 2/12    polyps x 2 - internal hemorrhoids - adenomatous polyps - due every 5 yrs     COLONOSCOPY N/A 2/21/2017    diverticula x 1 - due 5 yrs     ESOPHAGOSCOPY, GASTROSCOPY, DUODENOSCOPY (EGD), COMBINED  8/20/2013    Esophagitis LA Grade A, esophageal stricture     HC REPAIR ING HERNIA,5+Y/O,REDUCIBL  1978    rt     STRESS ECHO (METRO)  3/12    normal       Family Hx:  Family History   Problem Relation Age of Onset     Hypertension Mother 58     Connective Tissue Disorder Father 70     DIABETES Father 55     Breast Cancer Maternal Grandmother      Respiratory Paternal Grandfather      Colon Cancer No family hx of          Social Hx:  Social History     Social History     Marital status:      Spouse name: Lela     Number of children: 1     Years of education: 13     Occupational History     manage [employer]     Social History Main Topics     Smoking status: Never Smoker     Smokeless tobacco: Never Used     Alcohol use No     Drug use: No     Sexual activity: Yes     Partners: Female     Other Topics Concern       Service Yes     [employer] [place]     Caffeine Concern Yes     24 oz diet     Exercise Yes     3-5/wk, Ref's     Seat Belt Yes     Parent/Sibling W/ Cabg, Mi Or Angioplasty Before 65f 55m? No     Social History Narrative          MEDICATIONS:  has a current medication list which includes the following prescription(s): aspirin, blood glucose monitoring, continuous blood glucose monitoring, glimepiride, insulin lispro, levemir flextouch, lisinopril, losartan, naproxen sodium, omeprazole, pravastatin, sertraline, viagra, and victoza pen.    ROS:     ROS: 10 point ROS neg other than the symptoms noted above in the HPI.    GENERAL: no weight changes, fevers, chills, night sweats.   HEENT: reports good vision acuity; no dysphagia, odonophagia, diplopia  THYROID:  no apparent hyper or hypothyroid symptoms  CV: no chest pain, pressure, palpitations, skipped beats  LUNGS: no SOB, LEAVITT, cough, wheezing   ABDOMEN: no diarrhea, constipation, abdominal pain  EXTREMITIES: no rashes, ulcers, edema  NEUROLOGY: decreased sensation at feet at nighttime; no headaches, denies changes in vision,   MSK: some arthralgias at knees, ankles, back; no apparent weakness  SKIN: no rashes or lesions  PSYCH:  Stable mood, no significant anxiety or depression  ENDOCRINE: no heat or cold intolerance    Physical Exam   VS: /83 (BP Location: Right arm, Cuff Size: Adult Large)  Pulse 82  Ht 6' (1.829 m)  Wt 259 lb (117.5 kg)  BMI 35.13 kg/m2  GENERAL: AXOX3, NAD, well dressed, answering questions appropriately, appears stated age.  THYROID:  normal gland, no apparent nodules or goiter  ABDOMEN: obese, soft, nontender, nondistended, no organomegaly  MSK: grossly intact  SKIN: scalp balding; no rashes, no lesions    LABS:    All pertinent notes, labs, and images personally reviewed by me.     A/P:  Encounter Diagnoses   Name Primary?     Type 2 diabetes mellitus with stable proliferative retinopathy, with long-term current use of insulin,  unspecified laterality (H) Yes     Diabetic macular edema (H)      Type 2 diabetes mellitus with microalbuminuria, with long-term current use of insulin (H)      Comments:  I am pleased patient feeling well overall and having success with use of the Freestyle Armando Personal CGMS and glucose control.  Recent hgbA1c excellent but high urine microalbumin level and recent BP level.  Needs more aggressive BP control.    Plan:  Discussed general issues with the diabetes mellitus diagnosis and management  Reviewed the [id number] test which reflects previous overall glucose levels or control  Discussed the importance of blood glucose (BG) vs CGMS sensor glucose testing to assess glucose trends  Provided general overview of diabetes (oral and injectable) medication use    Recommend:  Continue current DM meds:   Levemir 54U sq QAM and 58U sq QPM   Humalog as H40- H20 (-35)- H20 (-25) schedule    Humalog sscale 5U per 50>150    glimeparide 4 mg 1 1/2 tabs QPM    Victoza 1.8 mg sq QD (recalls that his pharmacy/insurance only covers the 1.2 mg dose amount)  Check lab tests today, including urine micral while on new ARB + ACEI med plan.  Reviewed hypoglycemia prevention topics:   Lower the Levemir and Humalog doses by 10% prior to aerobic exercise, such as soccer reffing   Have glucose tablets or regular Gatoraid available at soccer events if feeling low BG   Use the Armando monitor wanding technique during exercise/soccer games, if possible, to assess glucose trends  Keep focus on diet, exercise, weight management  Advise having fasting lipid panel testing and dilated eye examination, at least annually    Addressed patient questions today    Labs ordered today:   Orders Placed This Encounter   Procedures     Basic metabolic panel     Hemoglobin A1c     Albumin Random Urine Quantitative with Creat Ratio     Radiology/Consults ordered today: None    More than 50% of the time spent with Mr. De Leon on counseling / coordinating his  care.  Total appointment time was 25 minutes.      Follow-up:  3 mo.    Vic Bustos MD  Endocrinology  [facility]

## 2018-05-14 NOTE — MR AVS SNAPSHOT
After Visit Summary   5/14/2018    Braden De Leon    MRN: [id number]           Patient Information     Date Of Birth          1970        Visit Information        Provider Department      5/14/2018 12:30 PM Vic Bustos MD [facility]        Today's Diagnoses     Type 2 diabetes mellitus with stable proliferative retinopathy, with long-term current use of insulin, unspecified laterality (H)    -  1    Diabetic macular edema (H)        Type 2 diabetes mellitus with microalbuminuria, with long-term current use of insulin (H)           Follow-ups after your visit        Follow-up notes from your care team     Return in about 3 months (around 8/14/2018).      Your next 10 appointments already scheduled     Aug 13, 2018  9:00 AM CDT   [facility] Visit with Vic [facility]ustos MD   [facility]    [address] [place] [address] [phone]              Who to contact     If you have questions or need follow up information about today's clinic visit or your schedule please contact [facility] directly at [phone].  Normal or non-critical lab and imaging results will be communicated to you by Alter-Ghart, letter or phone within 4 business days after the clinic has received the results. If you do not hear from us within 7 days, please contact the clinic through Alter-Ghart or phone. If you have a critical or abnormal lab result, we will notify you by phone as soon as possible.  Submit refill requests through Gazoob or call your pharmacy and they will forward the refill request to us. Please allow 3 business days for your refill to be completed.          Additional Information About Your Visit        Alter-Ghart Information     Gazoob gives you secure access to your electronic health record. If you see a primary care provider, you can also send messages to your care team and make appointments. If you have questions, please call  your primary care clinic.  If you do not have a primary care provider, please call [phone] and they will assist you.        Care EveryWhere ID     This is your Care EveryWhere ID. This could be used by other organizations to access your [place] medical records  [id number]        Your Vitals Were     Pulse Height BMI (Body Mass Index)             82 6' (1.829 m) 35.13 kg/m2          Blood Pressure from Last 3 Encounters:   05/14/18 141/83   02/06/18 (!) 164/91   01/30/18 138/88    Weight from Last 3 Encounters:   05/14/18 259 lb (117.5 kg)   02/06/18 257 lb (116.6 kg)   01/30/18 257 lb (116.6 kg)              We Performed the Following     Albumin Random Urine Quantitative with Creat Ratio     Basic metabolic panel     Hemoglobin A1c        Primary Care Provider Office Phone # Fax #    Pepe Spear [phone] [address] [facility] [address]        Equal Access to Services     LOIS WHELAN : Hadii aad ku hadasho Soomaali, waaxda luqadaha, qaybta kaalmada adeegyada, waxay idiin hayaan mauro lisa . So [facility] [phone].    ATENCIÓN: Si habla español, tiene a durham disposición servicios gratuitos de asistencia lingüística. [facility] [phone].    We comply with applicable federal civil rights laws and [place] laws. We do not discriminate on the basis of race, color, national origin, age, disability, sex, sexual orientation, or gender identity.            Thank you!     Thank you for choosing [facility]  for your care. Our goal is always to provide you with excellent care. Hearing back from our patients is one way we can continue to improve our services. Please take a few minutes to complete the written survey that you may receive in the mail after your visit with us. Thank you!             Your Updated Medication List - Protect others around you: Learn how to safely use, store and throw away your medicines at www.disposemymeds.org.          This list is  accurate as of 5/14/18  3:25 PM.  Always use your most recent med list.                   Brand Name Dispense Instructions for use Diagnosis    aspirin 81 MG tablet     30 tablet    Take 1 tablet (81 mg) by mouth daily    Type 2 diabetes mellitus without complication (H)       blood glucose monitoring lancets     2 Box    USE WHEN TESTING BLOOD SUGARS TWICE DAILY.    Type 2 diabetes mellitus without complication (H)       continuous blood glucose monitoring sensor     9 each    For use with Freestyle Armando Flash  for continuous monitioring of blood glucose levels. Replace sensor every 10 days.    Type 2 diabetes mellitus with stable proliferative retinopathy, with long-term current use of insulin, unspecified laterality (H)       glimepiride 4 MG tablet    AMARYL     TK 1/2 TO 1 T PO QPM UTD        Insulin Lispro 200 UNIT/ML soln    HUMALOG KWIKPEN    30 mL    Inject 40 units in the morning, 20 units at noon, and 20 units in evening according to sliding scale.    Type 2 diabetes mellitus with stable proliferative retinopathy, with long-term current use of insulin, unspecified laterality (H)       LEVEMIR FLEXTOUCH 100 UNIT/ML injection   Generic drug:  insulin detemir      INJ 60 UNITS BID        lisinopril 5 MG tablet    PRINIVIL/ZESTRIL    90 tablet    Take 1 tablet (5 mg) by mouth daily    Essential hypertension       losartan 100 MG tablet    COZAAR    90 tablet    TAKE 1 TABLET BY MOUTH DAILY    Hypertension goal BP (blood pressure) < 140/90, Type 2 diabetes mellitus with stable proliferative retinopathy, with long-term current use of insulin, unspecified laterality (H)       naproxen sodium 220 MG tablet    ALEVE    180 tablet    Take 2 tablets (440 mg) by mouth At Bedtime    Medication monitoring encounter       omeprazole 40 MG capsule    priLOSEC    90 capsule    TAKE 1 CAPSULE(40 MG) BY MOUTH DAILY    Gastroesophageal reflux disease without esophagitis       pravastatin 40 MG tablet    PRAVACHOL     90 tablet    TAKE 1 TABLET(40 MG) BY MOUTH DAILY    Hyperlipidemia with target LDL less than 70, Type 2 diabetes mellitus with stable proliferative retinopathy, with long-term current use of insulin, unspecified laterality (H), Proliferative diabetic retinopathy without macular edema associated with type 2 diabetes mellitus (H)       sertraline 100 MG tablet    ZOLOFT    135 tablet    Take 1.5 tablets (150 mg) by mouth daily    Mood changes (H)       VIAGRA 100 MG tablet   Generic drug:  sildenafil     6 tablet    TAKE 1/2 TO 1 TABLET(50  MG) BY MOUTH DAILY AS NEEDED FOR ERECTILE DYSFUNCTION    Erectile dysfunction, unspecified erectile dysfunction type       VICTOZA PEN 18 MG/3ML soln   Generic drug:  liraglutide     9 mL    Inject 1.8 mg Subcutaneous every morning    Type 2 diabetes mellitus with moderate nonproliferative retinopathy, with long-term current use of insulin, macular edema presence unspecified, unspecified laterality (H)

## 2018-05-15 LAB
ANION GAP SERPL CALCULATED.3IONS-SCNC: 9 MMOL/L (ref 3–14)
BUN SERPL-MCNC: 13 MG/DL (ref 7–30)
CALCIUM SERPL-MCNC: 8.5 MG/DL (ref 8.5–10.1)
CHLORIDE SERPL-SCNC: 110 MMOL/L (ref 94–109)
CO2 SERPL-SCNC: 27 MMOL/L (ref 20–32)
CREAT SERPL-MCNC: 1 MG/DL (ref 0.66–1.25)
CREAT UR-MCNC: 154 MG/DL
GFR SERPL CREATININE-BSD FRML MDRD: 79 ML/MIN/1.7M2
GLUCOSE SERPL-MCNC: 131 MG/DL (ref 70–99)
MICROALBUMIN UR-MCNC: 1770 MG/L
MICROALBUMIN/CREAT UR: 1149.35 MG/G CR (ref 0–17)
POTASSIUM SERPL-SCNC: 3.8 MMOL/L (ref 3.4–5.3)
SODIUM SERPL-SCNC: 146 MMOL/L (ref 133–144)

## 2018-05-29 ENCOUNTER — TRANSFERRED RECORDS (OUTPATIENT)
Dept: HEALTH INFORMATION MANAGEMENT | Facility: CLINIC | Age: 48
End: 2018-05-29

## 2018-06-06 DIAGNOSIS — K21.9 GASTROESOPHAGEAL REFLUX DISEASE WITHOUT ESOPHAGITIS: ICD-10-CM

## 2018-06-06 NOTE — TELEPHONE ENCOUNTER
"Requested Prescriptions   Pending Prescriptions Disp Refills     omeprazole (PRILOSEC) 40 MG capsule [Pharmacy Med Name: OMEPRAZOLE 40MG CAPSULES] 90 capsule 0    Last Written Prescription Date:  3.12.18  Last Fill Quantity: 90 capsule,  # refills: 0   Last Office Visit: 1/30/2018   Future Office Visit:    Next 5 appointments (look out 90 days)     Aug 13, 2018  9:00 AM CDT   Return Visit with Vic Bustos MD   [facility] ([address] [id number] [phone]                  Sig: TAKE 1 CAPSULE(40 MG) BY MOUTH DAILY    PPI Protocol Passed    6/6/2018  3:26 AM       Passed - Not on Clopidogrel (unless Pantoprazole ordered)       Passed - No diagnosis of osteoporosis on record       Passed - Recent (12 mo) or future (30 days) visit within the authorizing provider's specialty    Patient had office visit in the last 12 months or has a visit in the next 30 days with authorizing provider or within the authorizing provider's specialty.  See \"Patient Info\" tab in inbasket, or \"Choose Columns\" in Meds & Orders section of the refill encounter.           Passed - Patient is age 18 or older          "

## 2018-06-07 RX ORDER — OMEPRAZOLE 40 MG/1
CAPSULE, DELAYED RELEASE ORAL
Qty: 30 CAPSULE | Refills: 1 | Status: SHIPPED | OUTPATIENT
Start: 2018-06-07 | End: 2018-12-24

## 2018-06-07 NOTE — TELEPHONE ENCOUNTER
Prescription approved per [facility] Refill Protocol.  #30 with 1 refill given. Due for diabetes recheck in July 2018.    LADARIUS Saez, RN, N  [facility]) [phone]

## 2018-06-21 ENCOUNTER — TELEPHONE (OUTPATIENT)
Dept: ENDOCRINOLOGY | Facility: CLINIC | Age: 48
End: 2018-06-21

## 2018-06-21 DIAGNOSIS — E11.3559 TYPE 2 DIABETES MELLITUS WITH STABLE PROLIFERATIVE RETINOPATHY, WITH LONG-TERM CURRENT USE OF INSULIN, UNSPECIFIED LATERALITY (H): Primary | ICD-10-CM

## 2018-06-21 DIAGNOSIS — Z79.4 TYPE 2 DIABETES MELLITUS WITH STABLE PROLIFERATIVE RETINOPATHY, WITH LONG-TERM CURRENT USE OF INSULIN, UNSPECIFIED LATERALITY (H): Primary | ICD-10-CM

## 2018-06-21 NOTE — TELEPHONE ENCOUNTER
Reason for call:  Patient reporting a symptom    Symptom or request: High blood sugar levels today. 404 was highest, hovering in 360s and higher. Currently 364. Pt would like to be contacted to discuss    Duration (how long have symptoms been present): Today    Have you been treated for this before? Yes    Phone Number patient can be reached at:  Home number on file [phone] (home)    Best Time:  Anytime    Can we leave a detailed message on this number:  YES    Call taken on 6/21/2018 at 1:28 PM by Pattie Matias

## 2018-06-21 NOTE — TELEPHONE ENCOUNTER
Please route to a triage RN to call this patient back as the CDE team is not following him and we do not have our protocol in place for him.     Melissa Lux, FABI LD CDE

## 2018-06-21 NOTE — TELEPHONE ENCOUNTER
"Spoke with patient:   BGL was as high as 404 today - highest he's ever been (at 12:56pm)   Denies any symptoms   NO: Excessive thirst, dry mouth, frequent urination, fatigue, drowsiness, no vision changes, fruity breath   Took insulin - rechecked 10 mins ago - down to 168 now   Takes insulin per sliding scale     6:30am 119 BGL   10:55 333 BGL  11:16 320  12:17 365  12:56 404  1:03 397  1:15 368  2:20 364  2:37 199  Now: 168     Now needing a higher \"jolt\" of insulin   Weird spike today   No changes in diet/exercise   No illness     Yesterday   6:21am 100 BGL  8:00am 277 30 mins after eating   4:00pm - 215 BGL    6/19/18:   2:20pm 198 after eating   11am 71, 9am 137     Discussed symptoms requiring ER visit if pt experiences them.    Dr. Mcintyre, would this patient benefit from a referral to diabetes educator?   I have pended an order in  for review/completion     Tricia MARQUEZ RN    "

## 2018-06-23 NOTE — TELEPHONE ENCOUNTER
Recent patient message noted... I was away from the clinic.  Appreciate triage nurse assistance.  I agree with the plan for Diabetes Education Referral and have signed/sent this order today, also sent patient MyChart message.    MAI Bustos MD  Endocrinology  [facility]

## 2018-06-25 ENCOUNTER — TELEPHONE (OUTPATIENT)
Dept: FAMILY MEDICINE | Facility: CLINIC | Age: 48
End: 2018-06-25

## 2018-06-25 DIAGNOSIS — Z79.4 TYPE 2 DIABETES MELLITUS WITH STABLE PROLIFERATIVE RETINOPATHY, WITH LONG-TERM CURRENT USE OF INSULIN, UNSPECIFIED LATERALITY (H): Primary | ICD-10-CM

## 2018-06-25 DIAGNOSIS — E11.3559 TYPE 2 DIABETES MELLITUS WITH STABLE PROLIFERATIVE RETINOPATHY, WITH LONG-TERM CURRENT USE OF INSULIN, UNSPECIFIED LATERALITY (H): Primary | ICD-10-CM

## 2018-06-25 NOTE — LETTER
[facility]  [address] [place] [address]  [phone]  July 9, 2018    Braden De Leon  [address] [facility] [address]    Dear Braden,    I care about your health and have reviewed your health plan. I have reviewed your medical conditions, medication list, and lab results and am making recommendations based on this review, to better manage your health.    You are in particular need of attention regarding:  -Wellness (Physical) Visit     I am recommending that you:  -schedule a WELLNESS (Physical) APPOINTMENT with me.   I will check fasting labs the same day - nothing to eat except water and meds for 8-10 hours prior.      Here is a list of Health Maintenance topics that are due now or due soon:    Health Maintenance Due   Topic Date Due     Pneumovax Vaccine  03/18/1970     HIV SCREEN (SYSTEM ASSIGNED)  03/18/1986     Diabetic Foot Exam - yearly  02/16/2017     Wellness Visit with your Primary Provider - yearly  01/10/2018     Colon Cancer Screening - FIT Test - yearly  05/23/2018     Eye Exam - yearly  08/01/2018       Please call us at [phone] (or use [employer]) to address the above recommendations.                       Thank you for trusting [facility] and we appreciate the opportunity to serve you.  We look forward to supporting your healthcare needs in the future.    Healthy RegardsPepe M.D.

## 2018-06-26 NOTE — TELEPHONE ENCOUNTER
Routing refill request to provider for review/approval because:  Medication is reported/historical  Sarah Early RN  [place]

## 2018-06-26 NOTE — TELEPHONE ENCOUNTER
"Requested Prescriptions   Pending Prescriptions Disp Refills     NOVOFINE PLUS 32G X 4 MM [Pharmacy Med Name: NOVOFINE PLS [id number] PEN NEEDLES] 300 each 0      Last Written Prescription Date:  None noted-did not find med in history or on current med list   Last Fill Quantity: n/a,  # refills: n/a --   Last office visit: 1/30/2018   Next 5 appointments (look out 90 days)     Aug 13, 2018  9:00 AM CDT   Return Visit with Vic Bustos MD   [facility] ([address] [address]   [phone]                  Sig: USE AS DIRECTED    Diabetic Supplies Protocol Passed    6/26/2018 11:04 AM       Passed - Patient is 18 years of age or older       Passed - Recent (6 mo) or future (30 days) visit within the authorizing provider's specialty    Patient had office visit in the last 6 months or has a visit in the next 30 days with authorizing provider.  See \"Patient Info\" tab in inbasket, or \"Choose Columns\" in Meds & Orders section of the refill encounter.              "

## 2018-06-27 ENCOUNTER — TELEPHONE (OUTPATIENT)
Dept: EDUCATION SERVICES | Facility: CLINIC | Age: 48
End: 2018-06-27

## 2018-06-27 NOTE — TELEPHONE ENCOUNTER
Diabetes Education Scheduling Outreach #1:    Call to patient to schedule. Left message with phone number to call to schedule.    Plan for 2nd outreach attempt within 1 week.    More Farmer  [place] [facility]  Diabetes and Nutrition Scheduling

## 2018-06-28 NOTE — TELEPHONE ENCOUNTER
Reason for Call:  Other returning call    Detailed comments: Pt called this afternoon returning a phone call from Dr. Bustos's office. Please give pt a call back ASAP. Thank you.    Phone Number Patient can be reached at: Home number on file [phone] (home)    Best Time:     Can we leave a detailed message on this number? YES    Call taken on 6/28/2018 at 4:57 PM by Sara Mckeon

## 2018-06-29 NOTE — TELEPHONE ENCOUNTER
Pt was called to schedule Diabetes Education. Routing to [facility] as an FYI that pt called back to schedule.       Thank you!     Olivia KANG RN

## 2018-07-09 NOTE — TELEPHONE ENCOUNTER
Second attempt - Left non-detailed message for patient to call back.  Please schedule follow up when patient calls back.  (see previous notes for details)  Letter sent.  Closing encounter.    Anushka Bray

## 2018-10-06 ENCOUNTER — HOSPITAL ENCOUNTER (EMERGENCY)
Facility: CLINIC | Age: 48
Discharge: HOME | End: 2018-10-06
Attending: INTERNAL MEDICINE | Admitting: INTERNAL MEDICINE
Payer: COMMERCIAL

## 2018-10-06 ENCOUNTER — APPOINTMENT (OUTPATIENT)
Dept: CT IMAGING | Facility: CLINIC | Age: 48
End: 2018-10-06
Attending: INTERNAL MEDICINE
Payer: COMMERCIAL

## 2018-10-06 VITALS
TEMPERATURE: 97.6 F | OXYGEN SATURATION: 98 % | SYSTOLIC BLOOD PRESSURE: 180 MMHG | DIASTOLIC BLOOD PRESSURE: 95 MMHG | HEART RATE: 85 BPM | RESPIRATION RATE: 16 BRPM

## 2018-10-06 DIAGNOSIS — N20.1 URETEROLITHIASIS: ICD-10-CM

## 2018-10-06 PROBLEM — N20.0 NEPHROLITHIASIS: Status: ACTIVE | Noted: 2018-10-01

## 2018-10-06 LAB
ALBUMIN SERPL-MCNC: 4.2 G/DL (ref 3.4–5)
ALBUMIN UR-MCNC: 100 MG/DL
ALP SERPL-CCNC: 110 U/L (ref 40–150)
ALT SERPL W P-5'-P-CCNC: 24 U/L (ref 0–70)
ANION GAP SERPL CALCULATED.3IONS-SCNC: 9 MMOL/L (ref 3–14)
APPEARANCE UR: CLEAR
AST SERPL W P-5'-P-CCNC: 32 U/L (ref 0–45)
BASOPHILS # BLD AUTO: 0 10E9/L (ref 0–0.2)
BASOPHILS NFR BLD AUTO: 0.2 %
BILIRUB SERPL-MCNC: 0.9 MG/DL (ref 0.2–1.3)
BILIRUB UR QL STRIP: NEGATIVE
BUN SERPL-MCNC: 22 MG/DL (ref 7–30)
CALCIUM SERPL-MCNC: 8.6 MG/DL (ref 8.5–10.1)
CHLORIDE SERPL-SCNC: 105 MMOL/L (ref 94–109)
CO2 SERPL-SCNC: 26 MMOL/L (ref 20–32)
COLOR UR AUTO: ABNORMAL
CREAT SERPL-MCNC: 1.19 MG/DL (ref 0.66–1.25)
DIFFERENTIAL METHOD BLD: ABNORMAL
EOSINOPHIL # BLD AUTO: 0 10E9/L (ref 0–0.7)
EOSINOPHIL NFR BLD AUTO: 0.1 %
ERYTHROCYTE [DISTWIDTH] IN BLOOD BY AUTOMATED COUNT: 13 % (ref 10–15)
GFR SERPL CREATININE-BSD FRML MDRD: 65 ML/MIN/1.7M2
GLUCOSE SERPL-MCNC: 291 MG/DL (ref 70–99)
GLUCOSE UR STRIP-MCNC: >499 MG/DL
HCT VFR BLD AUTO: 41.8 % (ref 40–53)
HGB BLD-MCNC: 14.2 G/DL (ref 13.3–17.7)
HGB UR QL STRIP: ABNORMAL
IMM GRANULOCYTES # BLD: 0.2 10E9/L (ref 0–0.4)
IMM GRANULOCYTES NFR BLD: 1 %
KETONES UR STRIP-MCNC: 5 MG/DL
LACTATE BLD-SCNC: 1.7 MMOL/L (ref 0.7–2)
LEUKOCYTE ESTERASE UR QL STRIP: NEGATIVE
LIPASE SERPL-CCNC: 499 U/L (ref 73–393)
LYMPHOCYTES # BLD AUTO: 1.4 10E9/L (ref 0.8–5.3)
LYMPHOCYTES NFR BLD AUTO: 7.7 %
MCH RBC QN AUTO: 27.7 PG (ref 26.5–33)
MCHC RBC AUTO-ENTMCNC: 34 G/DL (ref 31.5–36.5)
MCV RBC AUTO: 82 FL (ref 78–100)
MONOCYTES # BLD AUTO: 0.6 10E9/L (ref 0–1.3)
MONOCYTES NFR BLD AUTO: 3.1 %
NEUTROPHILS # BLD AUTO: 15.9 10E9/L (ref 1.6–8.3)
NEUTROPHILS NFR BLD AUTO: 87.9 %
NITRATE UR QL: NEGATIVE
NRBC # BLD AUTO: 0 10*3/UL
NRBC BLD AUTO-RTO: 0 /100
PH UR STRIP: 6 PH (ref 5–7)
PLATELET # BLD AUTO: 211 10E9/L (ref 150–450)
POTASSIUM SERPL-SCNC: 4.1 MMOL/L (ref 3.4–5.3)
PROT SERPL-MCNC: 8.2 G/DL (ref 6.8–8.8)
RBC # BLD AUTO: 5.12 10E12/L (ref 4.4–5.9)
RBC #/AREA URNS AUTO: 46 /HPF (ref 0–2)
SODIUM SERPL-SCNC: 140 MMOL/L (ref 133–144)
SOURCE: ABNORMAL
SP GR UR STRIP: 1.01 (ref 1–1.03)
UROBILINOGEN UR STRIP-MCNC: 0 MG/DL (ref 0–2)
WBC # BLD AUTO: 18.1 10E9/L (ref 4–11)
WBC #/AREA URNS AUTO: 1 /HPF (ref 0–5)

## 2018-10-06 PROCEDURE — 74176 CT ABD & PELVIS W/O CONTRAST: CPT

## 2018-10-06 PROCEDURE — 80053 COMPREHEN METABOLIC PANEL: CPT | Performed by: INTERNAL MEDICINE

## 2018-10-06 PROCEDURE — 96361 HYDRATE IV INFUSION ADD-ON: CPT

## 2018-10-06 PROCEDURE — 83605 ASSAY OF LACTIC ACID: CPT | Performed by: INTERNAL MEDICINE

## 2018-10-06 PROCEDURE — 83690 ASSAY OF LIPASE: CPT | Performed by: INTERNAL MEDICINE

## 2018-10-06 PROCEDURE — 96374 THER/PROPH/DIAG INJ IV PUSH: CPT

## 2018-10-06 PROCEDURE — 99285 EMERGENCY DEPT VISIT HI MDM: CPT | Mod: 25

## 2018-10-06 PROCEDURE — 25000128 H RX IP 250 OP 636: Performed by: INTERNAL MEDICINE

## 2018-10-06 PROCEDURE — 81001 URINALYSIS AUTO W/SCOPE: CPT | Performed by: INTERNAL MEDICINE

## 2018-10-06 PROCEDURE — 96375 TX/PRO/DX INJ NEW DRUG ADDON: CPT

## 2018-10-06 PROCEDURE — 96376 TX/PRO/DX INJ SAME DRUG ADON: CPT

## 2018-10-06 PROCEDURE — 85025 COMPLETE CBC W/AUTO DIFF WBC: CPT | Performed by: INTERNAL MEDICINE

## 2018-10-06 RX ORDER — OXYCODONE AND ACETAMINOPHEN 5; 325 MG/1; MG/1
1-2 TABLET ORAL EVERY 4 HOURS PRN
Qty: 12 TABLET | Refills: 0 | Status: SHIPPED | OUTPATIENT
Start: 2018-10-06 | End: 2019-06-25

## 2018-10-06 RX ORDER — ONDANSETRON 2 MG/ML
4 INJECTION INTRAMUSCULAR; INTRAVENOUS EVERY 30 MIN PRN
Status: DISCONTINUED | OUTPATIENT
Start: 2018-10-06 | End: 2018-10-06 | Stop reason: HOSPADM

## 2018-10-06 RX ORDER — LABETALOL HYDROCHLORIDE 5 MG/ML
20 INJECTION, SOLUTION INTRAVENOUS EVERY 10 MIN PRN
Status: DISCONTINUED | OUTPATIENT
Start: 2018-10-06 | End: 2018-10-06 | Stop reason: HOSPADM

## 2018-10-06 RX ORDER — ONDANSETRON 4 MG/1
4 TABLET, ORALLY DISINTEGRATING ORAL EVERY 8 HOURS PRN
Qty: 10 TABLET | Refills: 0 | Status: SHIPPED | OUTPATIENT
Start: 2018-10-06 | End: 2019-02-26

## 2018-10-06 RX ORDER — KETOROLAC TROMETHAMINE 15 MG/ML
15 INJECTION, SOLUTION INTRAMUSCULAR; INTRAVENOUS ONCE
Status: COMPLETED | OUTPATIENT
Start: 2018-10-06 | End: 2018-10-06

## 2018-10-06 RX ORDER — TAMSULOSIN HYDROCHLORIDE 0.4 MG/1
0.4 CAPSULE ORAL DAILY
Qty: 10 CAPSULE | Refills: 0 | Status: SHIPPED | OUTPATIENT
Start: 2018-10-06 | End: 2019-02-26

## 2018-10-06 RX ORDER — IBUPROFEN 800 MG/1
800 TABLET, FILM COATED ORAL EVERY 8 HOURS PRN
Qty: 60 TABLET | Refills: 0 | Status: SHIPPED | OUTPATIENT
Start: 2018-10-06 | End: 2019-02-26

## 2018-10-06 RX ORDER — SODIUM CHLORIDE 9 MG/ML
1000 INJECTION, SOLUTION INTRAVENOUS CONTINUOUS
Status: DISCONTINUED | OUTPATIENT
Start: 2018-10-06 | End: 2018-10-06 | Stop reason: HOSPADM

## 2018-10-06 RX ORDER — HYDROMORPHONE HYDROCHLORIDE 1 MG/ML
0.5 INJECTION, SOLUTION INTRAMUSCULAR; INTRAVENOUS; SUBCUTANEOUS
Status: DISCONTINUED | OUTPATIENT
Start: 2018-10-06 | End: 2018-10-06 | Stop reason: HOSPADM

## 2018-10-06 RX ADMIN — ONDANSETRON HYDROCHLORIDE 4 MG: 2 INJECTION, SOLUTION INTRAMUSCULAR; INTRAVENOUS at 09:31

## 2018-10-06 RX ADMIN — Medication 0.5 MG: at 08:23

## 2018-10-06 RX ADMIN — SODIUM CHLORIDE 1000 ML: 9 INJECTION, SOLUTION INTRAVENOUS at 08:24

## 2018-10-06 RX ADMIN — KETOROLAC TROMETHAMINE 15 MG: 15 INJECTION, SOLUTION INTRAMUSCULAR; INTRAVENOUS at 09:27

## 2018-10-06 RX ADMIN — ONDANSETRON HYDROCHLORIDE 4 MG: 2 INJECTION, SOLUTION INTRAMUSCULAR; INTRAVENOUS at 08:22

## 2018-10-06 RX ADMIN — Medication 0.5 MG: at 09:19

## 2018-10-06 ASSESSMENT — ENCOUNTER SYMPTOMS
FEVER: 0
DYSURIA: 0
RHINORRHEA: 0
VOMITING: 1
NAUSEA: 1
HEMATURIA: 0
SORE THROAT: 0
ABDOMINAL PAIN: 1
COUGH: 0

## 2018-10-06 NOTE — ED AVS SNAPSHOT
[facility] Emergency Department    [address]    [facility] [address]    Phone:  [phone]    Fax:  [phone]                                       Braden De Leon   MRN: [id number]    Department:  [facility] Emergency Department   Date of Visit:  10/6/2018           Patient Information     Date Of Birth          1970        Your diagnoses for this visit were:     Ureterolithiasis        You were seen by Angeles Gonzalez MD.      Follow-up Information     Follow up with [facility] In 3 days.    Contact information:    [address]  [address]  [facility] [address] [phone]        Discharge Instructions       Do not take Viagra while taking Flomax      Discharge Instructions  Kidney Stones    Kidney stones are a common problem that can cause a lot of pain but fortunately are usually not dangerous and can be generally treated with medicine at home.  However, sometimes your condition may be worse than it seemed at first, or may get worse with time.     You need to follow-up with your regular doctor within 3 days.    Most kidney stones will pass on their own, but occasionally stones may need to be removed by an urologist. We will send you home with a urine strainer. Be sure to urinate into this, or urinate into a container and pour the urine through the fine filter to catch the kidney stone as it comes out. The stone will seem like a pebble or grain of sand. Be sure to save this in a Ziploc  bag and take it to the doctor s office with you.       Return to the Emergency Department if:    Your pain is not controlled.    You are vomiting and can t keep fluids or medications down.    You develop fever (>101).    You feel much more ill or develop new symptoms.  What can I do to help myself?    Be sure to drink plenty of fluids.    Staying active is good, and may help the stone to pass. You may do whatever you feel up to doing  without restrictions.   Treatment:    Non-steroidal anti-inflammatory drugs (NSAIDs). This includes prescription medicines like Toradol  (ketorolac) and non-prescription medicines like Advil  (ibuprofen) and Nuprin  (ibuprofen). These pain relievers are very effective for kidney stones.    Narcotic pain pills. If you have been given a narcotic such as Vicodin  (hydrocodone with acetaminophen), Percocet  (oxycodone with acetaminophen), or codeine, do not drive for four hours after you have taken it. If the narcotic contains Tylenol  (acetaminophen), do not take Tylenol  with it. All narcotics will cause constipation, so eat a high fiber diet.      Nausea medication.  Nausea and vomiting are common with kidney stones, so your physician may send you home with medicine for this.     Flomax  (tamsulosin). This medicine is sometimes used for men with prostate problems, but also can help kidney stones to pass. This medicine can lower blood pressure, and you may feel faint, especially when you first stand up. Be sure to get up gradually, sit down if you feel faint, and avoid activity where feeling faint would be dangerous, such as climbing ladders.   If you were given a prescription for medicine here today, be sure to read all of the information (including the package insert) that comes with your prescription.  This will include important information about the medicine, its side effects, and any warnings that you need to know about.  The pharmacist who fills the prescription can provide more information and answer questions you may have about the medicine.  If you have questions or concerns that the pharmacist cannot address, please call or return to the Emergency Department.   Opioid Medication Information    Pain medications are among the most commonly prescribed medicines, so we are including this information for all our patients. If you did not receive pain medication or get a prescription for pain medicine, you can  ignore it.     You may have been given a prescription for an opioid (narcotic) pain medicine and/or have received a pain medicine while here in the Emergency Department. These medicines can make you drowsy or impaired. You must not drive, operate dangerous equipment, or engage in any other dangerous activities while taking these medications. If you drive while taking these medications, you could be arrested for DUI, or driving under the influence. Do not drink any alcohol while you are taking these medications.     Opioid pain medications can cause addiction. If you have a history of chemical dependency of any type, you are at a higher risk of becoming addicted to pain medications.  Only take these prescribed medications to treat your pain when all other options have been tried. Take it for as short a time and as few doses as possible. Store your pain pills in a secure place, as they are frequently stolen and provide a dangerous opportunity for children or visitors in your house to start abusing these powerful medications. We will not replace any lost or stolen medicine.  As soon as your pain is better, you should flush all your remaining medication.     Many prescription pain medications contain Tylenol  (acetaminophen), including Vicodin , Tylenol #3 , Norco , Lortab , and Percocet .  You should not take any extra pills of Tylenol  if you are using these prescription medications or you can get very sick.  Do not ever take more than 3000 mg of acetaminophen in any 24 hour period.    All opioids tend to cause constipation. Drink plenty of water and eat foods that have a lot of fiber, such as fruits, vegetables, prune juice, apple juice and high fiber cereal.  Take a laxative if you don t move your bowels at least every other day. Miralax , Milk of Magnesia, Colace , or Senna  can be used to keep you regular.      Remember that you can always come back to the Emergency Department if you are not able to see your  regular doctor in the amount of time listed above, if you get any new symptoms, or if there is anything that worries you.      24 Hour Appointment Hotline       To make an appointment at any [place] clinic, call [phone] ([phone]). If you don't have a family doctor or clinic, we will help you find one. [place] clinics are conveniently located to serve the needs of you and your family.             Review of your medicines      START taking        Dose / Directions Last dose taken    ibuprofen 800 MG tablet   Commonly known as:  ADVIL/MOTRIN   Dose:  800 mg   Quantity:  60 tablet        Take 1 tablet (800 mg) by mouth every 8 hours as needed for moderate pain   Refills:  0        ondansetron 4 MG ODT tab   Commonly known as:  ZOFRAN ODT   Dose:  4 mg   Quantity:  10 tablet        Take 1 tablet (4 mg) by mouth every 8 hours as needed for nausea or vomiting   Refills:  0        oxyCODONE-acetaminophen 5-325 MG per tablet   Commonly known as:  PERCOCET   Dose:  1-2 tablet   Quantity:  12 tablet        Take 1-2 tablets by mouth every 4 hours as needed for pain   Refills:  0        tamsulosin 0.4 MG capsule   Commonly known as:  FLOMAX   Dose:  0.4 mg   Quantity:  10 capsule        Take 1 capsule (0.4 mg) by mouth daily for 10 doses   Refills:  0          Our records show that you are taking the medicines listed below. If these are incorrect, please call your family doctor or clinic.        Dose / Directions Last dose taken    aspirin 81 MG tablet   Dose:  81 mg   Quantity:  30 tablet        Take 1 tablet (81 mg) by mouth daily   Refills:  0        blood glucose monitoring lancets   Quantity:  2 Box        USE WHEN TESTING BLOOD SUGARS TWICE DAILY.   Refills:  3        continuous blood glucose monitoring sensor   Quantity:  9 each        For use with Freestyle Armando Flash  for continuous monitioring of blood glucose levels. Replace sensor every 10 days.   Refills:  3        glimepiride 4 MG tablet    Commonly known as:  AMARYL   Quantity:  90 tablet        Take 1/2 to full tablet by mouth daily as directed   Refills:  3        Insulin Lispro 200 UNIT/ML soln   Commonly known as:  HUMALOG KWIKPEN   Quantity:  30 mL        Inject 40 units in the morning, 20 units at noon, and 20 units in evening according to sliding scale.   Refills:  11        LEVEMIR FLEXTOUCH 100 UNIT/ML injection   Quantity:  45 mL   Generic drug:  insulin detemir        Inject 54- 60 units sq BID, as directed, E11.9   Refills:  11        lisinopril 5 MG tablet   Commonly known as:  PRINIVIL/ZESTRIL   Dose:  5 mg   Quantity:  90 tablet        Take 1 tablet (5 mg) by mouth daily   Refills:  3        losartan 100 MG tablet   Commonly known as:  COZAAR   Quantity:  90 tablet        TAKE 1 TABLET BY MOUTH DAILY   Refills:  3        naproxen sodium 220 MG tablet   Commonly known as:  ALEVE   Dose:  440 mg   Quantity:  180 tablet        Take 2 tablets (440 mg) by mouth At Bedtime   Refills:  3        NOVOFINE PLUS 32G X 4 MM   Quantity:  300 each   Generic drug:  insulin pen needle        USE AS DIRECTED   Refills:  3        omeprazole 40 MG capsule   Commonly known as:  priLOSEC   Quantity:  30 capsule        TAKE 1 CAPSULE(40 MG) BY MOUTH DAILY   Refills:  1        pravastatin 40 MG tablet   Commonly known as:  PRAVACHOL   Quantity:  90 tablet        TAKE 1 TABLET(40 MG) BY MOUTH DAILY   Refills:  1        sertraline 100 MG tablet   Commonly known as:  ZOLOFT   Dose:  150 mg   Quantity:  135 tablet        Take 1.5 tablets (150 mg) by mouth daily   Refills:  3        VIAGRA 100 MG tablet   Quantity:  6 tablet   Generic drug:  sildenafil        TAKE 1/2 TO 1 TABLET(50  MG) BY MOUTH DAILY AS NEEDED FOR ERECTILE DYSFUNCTION   Refills:  0        VICTOZA PEN 18 MG/3ML soln   Dose:  1.8 mg   Quantity:  9 mL   Generic drug:  liraglutide        Inject 1.8 mg Subcutaneous every morning   Refills:  11                Information about OPIOIDS      PRESCRIPTION OPIOIDS: WHAT YOU NEED TO KNOW   We gave you an opioid (narcotic) pain medicine. It is important to manage your pain, but opioids are not always the best choice. You should first try all the other options your care team gave you. Take this medicine for as short a time (and as few doses) as possible.    Some activities can increase your pain, such as bandage changes or therapy sessions. It may help to take your pain medicine 30 to 60 minutes before these activities. Reduce your stress by getting enough sleep, working on hobbies you enjoy and practicing relaxation or meditation. Talk to your care team about ways to manage your pain beyond prescription opioids.    These medicines have risks:    DO NOT drive when on new or higher doses of pain medicine. These medicines can affect your alertness and reaction times, and you could be arrested for driving under the influence (DUI). If you need to use opioids long-term, talk to your care team about driving.    DO NOT operate heavy machinery    DO NOT do any other dangerous activities while taking these medicines.    DO NOT drink any alcohol while taking these medicines.     If the opioid prescribed includes acetaminophen, DO NOT take with any other medicines that contain acetaminophen. Read all labels carefully. Look for the word  acetaminophen  or  Tylenol.  Ask your pharmacist if you have questions or are unsure.    You can get addicted to pain medicines, especially if you have a history of addiction (chemical, alcohol or substance dependence). Talk to your care team about ways to reduce this risk.    All opioids tend to cause constipation. Drink plenty of water and eat foods that have a lot of fiber, such as fruits, vegetables, prune juice, apple juice and high-fiber cereal. Take a laxative (Miralax, milk of magnesia, Colace, Senna) if you don t move your bowels at least every other day. Other side effects include upset stomach, sleepiness, dizziness,  throwing up, tolerance (needing more of the medicine to have the same effect), physical dependence and slowed breathing.    Store your pills in a secure place, locked if possible. We will not replace any lost or stolen medicine. If you don t finish your medicine, please throw away (dispose) as directed by your pharmacist. The [place] [employer]ution [employer] has more information about safe disposal: [email or web address]        Prescriptions were sent or printed at these locations (4 Prescriptions)                   Other Prescriptions                Printed at Department/Unit printer (4 of 4)         ibuprofen (ADVIL/MOTRIN) 800 MG tablet               ondansetron (ZOFRAN ODT) 4 MG ODT tab               oxyCODONE-acetaminophen (PERCOCET) 5-325 MG per tablet               tamsulosin (FLOMAX) 0.4 MG capsule                Procedures and tests performed during your visit     CBC with platelets differential    CT Abdomen Pelvis w/o Contrast    Comprehensive metabolic panel    Lactic acid whole blood    Lipase    UA with Microscopic reflex to Culture      Orders Needing Specimen Collection     None      Pending Results     No orders found from 10/4/2018 to 10/7/2018.            Pending Culture Results     No orders found from 10/4/2018 to 10/7/2018.            Pending Results Instructions     If you had any lab results that were not finalized at the time of your Discharge, you can call the ED Lab Result RN at [phone]. You will be contacted by this team for any positive Lab results or changes in treatment. The nurses are available 7 days a week from 10A to 6:30P.  You can leave a message 24 hours per day and they will return your call.        Test Results From Your Hospital Stay        10/6/2018  8:35 AM      Component Results     Component Value Ref Range & Units Status    WBC 18.1 (H) 4.0 - 11.0 10e9/L Final    RBC Count 5.12 4.4 - 5.9 10e12/L Final    Hemoglobin  14.2 13.3 - 17.7 g/dL Final    Hematocrit 41.8 40.0 - 53.0 % Final    MCV 82 78 - 100 fl Final    MCH 27.7 26.5 - 33.0 pg Final    MCHC 34.0 31.5 - 36.5 g/dL Final    RDW 13.0 10.0 - 15.0 % Final    Platelet Count 211 150 - 450 10e9/L Final    Diff Method Automated Method  Final    % Neutrophils 87.9 % Final    % Lymphocytes 7.7 % Final    % Monocytes 3.1 % Final    % Eosinophils 0.1 % Final    % Basophils 0.2 % Final    % Immature Granulocytes 1.0 % Final    Nucleated RBCs 0 0 /100 Final    Absolute Neutrophil 15.9 (H) 1.6 - 8.3 10e9/L Final    Absolute Lymphocytes 1.4 0.8 - 5.3 10e9/L Final    Absolute Monocytes 0.6 0.0 - 1.3 10e9/L Final    Absolute Eosinophils 0.0 0.0 - 0.7 10e9/L Final    Absolute Basophils 0.0 0.0 - 0.2 10e9/L Final    Abs Immature Granulocytes 0.2 0 - 0.4 10e9/L Final    Absolute Nucleated RBC 0.0  Final         10/6/2018  8:56 AM      Component Results     Component Value Ref Range & Units Status    Sodium 140 133 - 144 mmol/L Final    Potassium 4.1 3.4 - 5.3 mmol/L Final    Chloride 105 94 - 109 mmol/L Final    Carbon Dioxide 26 20 - 32 mmol/L Final    Anion Gap 9 3 - 14 mmol/L Final    Glucose 291 (H) 70 - 99 mg/dL Final    Urea Nitrogen 22 7 - 30 mg/dL Final    Creatinine 1.19 0.66 - 1.25 mg/dL Final    GFR Estimate 65 >60 mL/min/1.7m2 Final    Non  GFR Calc    GFR Estimate If Black 78 >60 mL/min/1.7m2 Final    African American GFR Calc    Calcium 8.6 8.5 - 10.1 mg/dL Final    Bilirubin Total 0.9 0.2 - 1.3 mg/dL Final    Albumin 4.2 3.4 - 5.0 g/dL Final    Protein Total 8.2 6.8 - 8.8 g/dL Final    Alkaline Phosphatase 110 40 - 150 U/L Final    ALT 24 0 - 70 U/L Final    AST 32 0 - 45 U/L Final         10/6/2018  8:56 AM      Component Results     Component Value Ref Range & Units Status    Lipase 499 (H) 73 - 393 U/L Final         10/6/2018  8:34 AM      Component Results     Component Value Ref Range & Units Status    Lactic Acid 1.7 0.7 - 2.0 mmol/L Final          10/6/2018  9:56 AM      Narrative     CT ABDOMEN AND PELVIS WITHOUT CONTRAST   10/6/2018 9:08 AM     HISTORY: Left lower quadrant pain, question stone versus other.     TECHNIQUE:   No IV contrast material. Radiation dose for this scan was  reduced using automated exposure control, adjustment of the mA and/or  kV according to patient size, or iterative reconstruction technique.    COMPARISON: None.    FINDINGS:   The lung bases are clear. The heart is enlarged.    There is a tiny hyperdensity in the dependent aspect of the  gallbladder, likely a gallstone. Within the limits of noncontrast  technique, no acute abnormality in the liver, spleen, pancreas, or  adrenal glands. The large and small bowel are normal in caliber. The  appendix is normal. There is trace pelvic ascites. Mild  atherosclerotic changes in the aorta but no evidence of aneurysm.    There are several nonobstructing right renal calculi. Single tiny  stone in the lower pole of the left kidney. There is mild left  hydronephrosis, and mild dilation of the left ureter extending to the  level of the left distal ureter where there is a 4 mm stone just above  the ureterovesical junction. There is moderate bilateral perinephric  stranding.         Impression     IMPRESSION:   1. 4 mm stone in the distal left ureter just above the ureterovesical  junction. This causes mild left hydronephrosis.  2. Bilateral nephrolithiasis.  3. Cholelithiasis.    CHAPITO SAMUEL MD         10/6/2018  9:38 AM      Component Results     Component Value Ref Range & Units Status    Color Urine Straw  Final    Appearance Urine Clear  Final    Glucose Urine >499 (A) NEG^Negative mg/dL Final    Bilirubin Urine Negative NEG^Negative Final    Ketones Urine 5 (A) NEG^Negative mg/dL Final    Specific Gravity Urine 1.014 1.003 - 1.035 Final    Blood Urine Moderate (A) NEG^Negative Final    pH Urine 6.0 5.0 - 7.0 pH Final    Protein Albumin Urine 100 (A) NEG^Negative mg/dL Final     Urobilinogen mg/dL 0.0 0.0 - 2.0 mg/dL Final    Nitrite Urine Negative NEG^Negative Final    Leukocyte Esterase Urine Negative NEG^Negative Final    Source Midstream Urine  Final    WBC Urine 1 0 - 5 /HPF Final    RBC Urine 46 (H) 0 - 2 /HPF Final                Clinical Quality Measure: Blood Pressure Screening     Your blood pressure was checked while you were in the emergency department today. The last reading we obtained was  BP: (!) 173/95 . Please read the guidelines below about what these numbers mean and what you should do about them.  If your systolic blood pressure (the top number) is less than 120 and your diastolic blood pressure (the bottom number) is less than 80, then your blood pressure is normal. There is nothing more that you need to do about it.  If your systolic blood pressure (the top number) is 120-139 or your diastolic blood pressure (the bottom number) is 80-89, your blood pressure may be higher than it should be. You should have your blood pressure rechecked within a year by a primary care provider.  If your systolic blood pressure (the top number) is 140 or greater or your diastolic blood pressure (the bottom number) is 90 or greater, you may have high blood pressure. High blood pressure is treatable, but if left untreated over time it can put you at risk for heart attack, stroke, or kidney failure. You should have your blood pressure rechecked by a primary care provider within the next 4 weeks.  If your provider in the emergency department today gave you specific instructions to follow-up with your doctor or provider even sooner than that, you should follow that instruction and not wait for up to 4 weeks for your follow-up visit.        Thank you for choosing [facility]       Thank you for choosing [facility] for your care. Our goal is always to provide you with excellent care. Hearing back from our patients is one way we can continue to improve our services. Please take a few minutes to  complete the written survey that you may receive in the mail after you visit with us. Thank you!        [employer] Information     [employer] gives you secure access to your electronic health record. If you see a primary care provider, you can also send messages to your care team and make appointments. If you have questions, please call your primary care clinic.  If you do not have a primary care provider, please call [phone] and they will assist you.        Care EveryWhere ID     This is your Care EveryWhere ID. This could be used by other organizations to access your [place] medical records  [id number]        Equal Access to Services     [facility] : Yovany Cornejo, lelo barton, ranjana tapia, nannette tuttle. So [facility] [phone].    ATENCIÓN: Si habla español, tiene a durham disposición servicios gratuitos de asistencia lingüística. Llame al [phone].    We comply with applicable federal civil rights laws and [place] laws. We do not discriminate on the basis of race, color, national origin, age, disability, sex, sexual orientation, or gender identity.            After Visit Summary       This is your record. Keep this with you and show to your community pharmacist(s) and doctor(s) at your next visit.

## 2018-10-06 NOTE — ED NOTES
Pt continues to be hypertensive (last /119). MD informed of this. Pt states that he took his regular BP meds last night. Denies headache, blurred vision.

## 2018-10-06 NOTE — ED TRIAGE NOTES
Pt presents to ED with sudden onset nausea, vomiting, abdominal pain at 2 am. Pt states that he felt fine when he went to bed. Pt also reports feeling feverish, chilled. ABC intact. A/O x4. Pt checked his BG this morning and reports that it was 265.

## 2018-10-06 NOTE — LETTER
October 6, 2018      To Whom It May Concern:      Braden De Leon was seen in our Emergency Department today, 10/06/18.  I expect his condition to improve over the next 2-3  days.  He may return to work when improved.    Sincerely,        Renuka Funez RN

## 2018-10-06 NOTE — ED AVS SNAPSHOT
[facility] Emergency Department    [address]    [facility] [address]    Phone:  [phone]    Fax:  [phone]                                       Braden De Leon   MRN: [id number]    Department:  [facility] Emergency Department   Date of Visit:  10/6/2018           After Visit Summary Signature Page     I have received my discharge instructions, and my questions have been answered. I have discussed any challenges I see with this plan with the nurse or doctor.    ..........................................................................................................................................  Patient/Patient Representative Signature      ..........................................................................................................................................  Patient Representative Print Name and Relationship to Patient    ..................................................               ................................................  Date                                   Time    ..........................................................................................................................................  Reviewed by Signature/Title    ...................................................              ..............................................  Date                                               Time          22EPIC Rev 08/18

## 2018-10-06 NOTE — DISCHARGE INSTRUCTIONS
Do not take Viagra while taking Flomax      Discharge Instructions  Kidney Stones    Kidney stones are a common problem that can cause a lot of pain but fortunately are usually not dangerous and can be generally treated with medicine at home.  However, sometimes your condition may be worse than it seemed at first, or may get worse with time.     You need to follow-up with your regular doctor within 3 days.    Most kidney stones will pass on their own, but occasionally stones may need to be removed by an urologist. We will send you home with a urine strainer. Be sure to urinate into this, or urinate into a container and pour the urine through the fine filter to catch the kidney stone as it comes out. The stone will seem like a pebble or grain of sand. Be sure to save this in a Ziploc  bag and take it to the doctor s office with you.       Return to the Emergency Department if:    Your pain is not controlled.    You are vomiting and can t keep fluids or medications down.    You develop fever (>101).    You feel much more ill or develop new symptoms.  What can I do to help myself?    Be sure to drink plenty of fluids.    Staying active is good, and may help the stone to pass. You may do whatever you feel up to doing without restrictions.   Treatment:    Non-steroidal anti-inflammatory drugs (NSAIDs). This includes prescription medicines like Toradol  (ketorolac) and non-prescription medicines like Advil  (ibuprofen) and Nuprin  (ibuprofen). These pain relievers are very effective for kidney stones.    Narcotic pain pills. If you have been given a narcotic such as Vicodin  (hydrocodone with acetaminophen), Percocet  (oxycodone with acetaminophen), or codeine, do not drive for four hours after you have taken it. If the narcotic contains Tylenol  (acetaminophen), do not take Tylenol  with it. All narcotics will cause constipation, so eat a high fiber diet.      Nausea medication.  Nausea and vomiting are common with  kidney stones, so your physician may send you home with medicine for this.     Flomax  (tamsulosin). This medicine is sometimes used for men with prostate problems, but also can help kidney stones to pass. This medicine can lower blood pressure, and you may feel faint, especially when you first stand up. Be sure to get up gradually, sit down if you feel faint, and avoid activity where feeling faint would be dangerous, such as climbing ladders.   If you were given a prescription for medicine here today, be sure to read all of the information (including the package insert) that comes with your prescription.  This will include important information about the medicine, its side effects, and any warnings that you need to know about.  The pharmacist who fills the prescription can provide more information and answer questions you may have about the medicine.  If you have questions or concerns that the pharmacist cannot address, please call or return to the Emergency Department.   Opioid Medication Information    Pain medications are among the most commonly prescribed medicines, so we are including this information for all our patients. If you did not receive pain medication or get a prescription for pain medicine, you can ignore it.     You may have been given a prescription for an opioid (narcotic) pain medicine and/or have received a pain medicine while here in the Emergency Department. These medicines can make you drowsy or impaired. You must not drive, operate dangerous equipment, or engage in any other dangerous activities while taking these medications. If you drive while taking these medications, you could be arrested for DUI, or driving under the influence. Do not drink any alcohol while you are taking these medications.     Opioid pain medications can cause addiction. If you have a history of chemical dependency of any type, you are at a higher risk of becoming addicted to pain medications.  Only take these  prescribed medications to treat your pain when all other options have been tried. Take it for as short a time and as few doses as possible. Store your pain pills in a secure place, as they are frequently stolen and provide a dangerous opportunity for children or visitors in your house to start abusing these powerful medications. We will not replace any lost or stolen medicine.  As soon as your pain is better, you should flush all your remaining medication.     Many prescription pain medications contain Tylenol  (acetaminophen), including Vicodin , Tylenol #3 , Norco , Lortab , and Percocet .  You should not take any extra pills of Tylenol  if you are using these prescription medications or you can get very sick.  Do not ever take more than 3000 mg of acetaminophen in any 24 hour period.    All opioids tend to cause constipation. Drink plenty of water and eat foods that have a lot of fiber, such as fruits, vegetables, prune juice, apple juice and high fiber cereal.  Take a laxative if you don t move your bowels at least every other day. Miralax , Milk of Magnesia, Colace , or Senna  can be used to keep you regular.      Remember that you can always come back to the Emergency Department if you are not able to see your regular doctor in the amount of time listed above, if you get any new symptoms, or if there is anything that worries you.

## 2018-10-06 NOTE — ED PROVIDER NOTES
History     Chief Complaint:  Abdominal Pain and Vomiting    HPI   Braden De Leon is a 48 year old male, who presents with his wife to the ED for the evaluation of abdominal pain and vomiting. Initially, the patient was in too much pain to give history. Brief history obtained from wife, then additional history after medication. The patient reports that at 0200 this morning he woke up and started to experience abdominal pain and that he has been vomiting ever since, prompting him to the ED. The patient notes that his pain is in the left lower quadrant of his abdomen and that it radiates to the left side of his groin. The patient also notes that after the onset of his symptoms he had a large bowel movement and felt like he had to urinate. The patient denies hematuria, dysuria, cough, fever, rhinorrhea, and sore throat.    Allergies:  Metformin  Penicillins  Thiethylperazine maleate     Medications:    Aspirin 81  Amaryl  Prinivil  Cozaar  Aleve  Prilosec  Pravachol  Zoloft  Viagra    Past Medical History:    Benign neoplasm of colon  Diabetic macular edemas  Erectile dysfunction  Esophageal reflux  Esophageal stricture  Esophagitis  Hyperlipidemia  Hypertension  Migraine  Obesity  Diabetic  Gastritis  Tarsal tunnel syndrome    Past Surgical History:    HC repairing hernia, 5+ y/o, reducible, right    Family History:    Hypertension  Connective tissue disorder  Diabetes    Social History:  Smoking status: Never Smoker  Alcohol use: No  Marital Status:   [2]     Review of Systems   Constitutional: Negative for fever.   HENT: Negative for rhinorrhea and sore throat.    Respiratory: Negative for cough.    Gastrointestinal: Positive for abdominal pain, nausea and vomiting.   Genitourinary: Negative for dysuria and hematuria.   All other systems reviewed and are negative.    Physical Exam   Patient Vitals for the past 24 hrs:   BP Temp Temp src Heart Rate Resp SpO2   10/06/18 0832 (!) 212/113 - - - - -   10/06/18  0810 - 97.6  F (36.4  C) Temporal 91 16 96 %   10/06/18 0808 (!) 236/134 - - - - -       Physical Exam   Constitutional: He is cooperative. He appears distressed.   Vomiting, diaphoretic   HENT:   Right Ear: Tympanic membrane normal.   Left Ear: Tympanic membrane normal.   Mouth/Throat: Oropharynx is clear and moist and mucous membranes are normal.   Eyes: Conjunctivae are normal.   Neck: Normal range of motion.   Cardiovascular: Regular rhythm and normal heart sounds.    Pulmonary/Chest: Effort normal and breath sounds normal.   Abdominal: Soft. Normal appearance and bowel sounds are normal. There is tenderness in the left lower quadrant. There is no rebound and no guarding.   Musculoskeletal: Normal range of motion.   Lymphadenopathy:     He has no cervical adenopathy.   Neurological: He is alert.   Skin: Skin is warm and dry.     Emergency Department Course   Imaging:  Radiographic findings were communicated with the patient and family who voiced understanding of the findings.  CT Abdomen Pelvis w/o Contrast  IMPRESSION:   1. 4 mm stone in the distal left ureter just above the ureterovesical  junction. This causes mild left hydronephrosis.  2. Bilateral nephrolithiasis.  3. Cholelithiasis.  As read by Radiology.    Laboratory:  CBC: WBC 18.1 (H), WNL (HGB 14.2, )  CMP: Glucose 291 (H), WNL (Creatinine 1.19)  Lipase: 499 (H)  Lactic acid whole blood (0834): 1.7  UA: GLC >499, Urineketon 5, BLOO Moderate, Protein Albumin 100, o/w Negative    Interventions:  0822, zofran, 4mg, IV  0823, dilaudid, 0.5 mg, IV  0824, NS 1L IV Bolus  0919, dilaudid, 0.5 mg, IV  0927, toradol, 15 mg, IV  0931, zofran, 4 mg, IV      Emergency Department Course:  Past medical records, nursing notes, and vitals reviewed.  0807: I performed an exam of the patient and obtained history, as documented above.    IV inserted and blood drawn.    The patient was sent for an abdomen CT while in the emergency department, findings  above.    0919: I rechecked the patient. Explained findings to patient and wife.    1018: I rechecked the patient.  Findings and plan explained to the Patient and spouse. Patient discharged home with instructions regarding supportive care, medications, and reasons to return. The importance of close follow-up was reviewed.     Impression & Plan    Medical Decision Making:    Braden De Leon is a 48 year old male who presents to the emergency department with abdominal pain and vomiting.  CT demonstrates distal left ureteral stone which is consistent with his symptoms.  CT also demonstrated cholelithiasis which I discussed with the patient.  He has had fatty food intolerance suggestive of symptomatic cholelithiasis and I recommended follow-up with general surgery.  While here his blood pressure has been significantly elevated.  He has known hypertension and had not taken his medication this morning.  Finally with pain relief it is come down to only mildly elevated levels.  I will have him take his blood pressure medicine on return home and recheck closely in clinic.  He is discharged with urine strainer, save any stone that passes, follow-up with urology in about 3 days.      Diagnosis:    ICD-10-CM    1. Ureterolithiasis N20.1        Disposition:  discharged to home    Discharge Medications:  New Prescriptions    IBUPROFEN (ADVIL/MOTRIN) 800 MG TABLET    Take 1 tablet (800 mg) by mouth every 8 hours as needed for moderate pain    ONDANSETRON (ZOFRAN ODT) 4 MG ODT TAB    Take 1 tablet (4 mg) by mouth every 8 hours as needed for nausea or vomiting    OXYCODONE-ACETAMINOPHEN (PERCOCET) 5-325 MG PER TABLET    Take 1-2 tablets by mouth every 4 hours as needed for pain    TAMSULOSIN (FLOMAX) 0.4 MG CAPSULE    Take 1 capsule (0.4 mg) by mouth daily for 10 doses         Vic Baxter  10/6/2018   [facility] EMERGENCY DEPARTMENT  Scribe Disclosure:  Vic LOPEZ, am serving as a scribe at 8:07 AM on  10/6/2018 to document services personally performed by Angeles Gonzalez MD based on my observations and the provider's statements to me.      Angeles Gonzalez MD  10/06/18 0627

## 2018-10-09 ENCOUNTER — OFFICE VISIT (OUTPATIENT)
Dept: FAMILY MEDICINE | Facility: CLINIC | Age: 48
End: 2018-10-09
Payer: COMMERCIAL

## 2018-10-09 VITALS
OXYGEN SATURATION: 96 % | HEIGHT: 72 IN | DIASTOLIC BLOOD PRESSURE: 100 MMHG | SYSTOLIC BLOOD PRESSURE: 156 MMHG | WEIGHT: 258 LBS | TEMPERATURE: 98.9 F | BODY MASS INDEX: 34.95 KG/M2 | HEART RATE: 96 BPM

## 2018-10-09 DIAGNOSIS — Z79.4 TYPE 2 DIABETES MELLITUS WITH STABLE PROLIFERATIVE RETINOPATHY, WITH LONG-TERM CURRENT USE OF INSULIN, UNSPECIFIED LATERALITY (H): ICD-10-CM

## 2018-10-09 DIAGNOSIS — I10 HYPERTENSION GOAL BP (BLOOD PRESSURE) < 140/90: ICD-10-CM

## 2018-10-09 DIAGNOSIS — R53.83 FATIGUE, UNSPECIFIED TYPE: ICD-10-CM

## 2018-10-09 DIAGNOSIS — R45.86 MOOD CHANGES: ICD-10-CM

## 2018-10-09 DIAGNOSIS — E66.811 CLASS 1 OBESITY WITH SERIOUS COMORBIDITY AND BODY MASS INDEX (BMI) OF 34.0 TO 34.9 IN ADULT, UNSPECIFIED OBESITY TYPE: ICD-10-CM

## 2018-10-09 DIAGNOSIS — Z00.00 ENCOUNTER FOR ROUTINE ADULT HEALTH EXAMINATION WITHOUT ABNORMAL FINDINGS: ICD-10-CM

## 2018-10-09 DIAGNOSIS — R11.0 NAUSEA: ICD-10-CM

## 2018-10-09 DIAGNOSIS — Z51.81 MEDICATION MONITORING ENCOUNTER: ICD-10-CM

## 2018-10-09 DIAGNOSIS — E11.3559 TYPE 2 DIABETES MELLITUS WITH STABLE PROLIFERATIVE RETINOPATHY, WITH LONG-TERM CURRENT USE OF INSULIN, UNSPECIFIED LATERALITY (H): ICD-10-CM

## 2018-10-09 DIAGNOSIS — K80.20 CALCULUS OF GALLBLADDER WITHOUT CHOLECYSTITIS WITHOUT OBSTRUCTION: ICD-10-CM

## 2018-10-09 DIAGNOSIS — Z12.5 SCREENING FOR PROSTATE CANCER: ICD-10-CM

## 2018-10-09 DIAGNOSIS — K21.9 GASTROESOPHAGEAL REFLUX DISEASE WITHOUT ESOPHAGITIS: ICD-10-CM

## 2018-10-09 DIAGNOSIS — N20.0 NEPHROLITHIASIS: Primary | ICD-10-CM

## 2018-10-09 DIAGNOSIS — G44.209 TENSION HEADACHE: ICD-10-CM

## 2018-10-09 DIAGNOSIS — E78.5 HYPERLIPIDEMIA WITH TARGET LDL LESS THAN 70: ICD-10-CM

## 2018-10-09 LAB
ALBUMIN UR-MCNC: >=300 MG/DL
APPEARANCE UR: CLEAR
BACTERIA #/AREA URNS HPF: ABNORMAL /HPF
BILIRUB UR QL STRIP: NEGATIVE
COLOR UR AUTO: YELLOW
ERYTHROCYTE [DISTWIDTH] IN BLOOD BY AUTOMATED COUNT: 13 % (ref 10–15)
GLUCOSE UR STRIP-MCNC: NEGATIVE MG/DL
HBA1C MFR BLD: 6.5 % (ref 0–5.6)
HCT VFR BLD AUTO: 41.5 % (ref 40–53)
HGB BLD-MCNC: 14 G/DL (ref 13.3–17.7)
HGB UR QL STRIP: ABNORMAL
KETONES UR STRIP-MCNC: NEGATIVE MG/DL
LEUKOCYTE ESTERASE UR QL STRIP: NEGATIVE
LIPASE SERPL-CCNC: 518 U/L (ref 73–393)
MCH RBC QN AUTO: 27.5 PG (ref 26.5–33)
MCHC RBC AUTO-ENTMCNC: 33.7 G/DL (ref 31.5–36.5)
MCV RBC AUTO: 82 FL (ref 78–100)
NITRATE UR QL: NEGATIVE
PH UR STRIP: 6 PH (ref 5–7)
PLATELET # BLD AUTO: 203 10E9/L (ref 150–450)
RBC # BLD AUTO: 5.09 10E12/L (ref 4.4–5.9)
RBC #/AREA URNS AUTO: ABNORMAL /HPF
SOURCE: ABNORMAL
SP GR UR STRIP: 1.02 (ref 1–1.03)
UROBILINOGEN UR STRIP-ACNC: 1 EU/DL (ref 0.2–1)
VIT B12 SERPL-MCNC: 336 PG/ML (ref 193–986)
WBC # BLD AUTO: 11.4 10E9/L (ref 4–11)
WBC #/AREA URNS AUTO: ABNORMAL /HPF

## 2018-10-09 PROCEDURE — 99214 OFFICE O/P EST MOD 30 MIN: CPT | Performed by: FAMILY MEDICINE

## 2018-10-09 PROCEDURE — 83690 ASSAY OF LIPASE: CPT | Performed by: FAMILY MEDICINE

## 2018-10-09 PROCEDURE — 82043 UR ALBUMIN QUANTITATIVE: CPT | Performed by: FAMILY MEDICINE

## 2018-10-09 PROCEDURE — 80061 LIPID PANEL: CPT | Performed by: FAMILY MEDICINE

## 2018-10-09 PROCEDURE — G0103 PSA SCREENING: HCPCS | Performed by: FAMILY MEDICINE

## 2018-10-09 PROCEDURE — 85027 COMPLETE CBC AUTOMATED: CPT | Performed by: FAMILY MEDICINE

## 2018-10-09 PROCEDURE — 82306 VITAMIN D 25 HYDROXY: CPT | Performed by: FAMILY MEDICINE

## 2018-10-09 PROCEDURE — 82607 VITAMIN B-12: CPT | Performed by: FAMILY MEDICINE

## 2018-10-09 PROCEDURE — 36415 COLL VENOUS BLD VENIPUNCTURE: CPT | Performed by: FAMILY MEDICINE

## 2018-10-09 PROCEDURE — 81001 URINALYSIS AUTO W/SCOPE: CPT | Performed by: FAMILY MEDICINE

## 2018-10-09 PROCEDURE — 84443 ASSAY THYROID STIM HORMONE: CPT | Performed by: FAMILY MEDICINE

## 2018-10-09 PROCEDURE — 80053 COMPREHEN METABOLIC PANEL: CPT | Performed by: FAMILY MEDICINE

## 2018-10-09 PROCEDURE — 82550 ASSAY OF CK (CPK): CPT | Performed by: FAMILY MEDICINE

## 2018-10-09 PROCEDURE — 83036 HEMOGLOBIN GLYCOSYLATED A1C: CPT | Performed by: FAMILY MEDICINE

## 2018-10-09 RX ORDER — SERTRALINE HYDROCHLORIDE 100 MG/1
200 TABLET, FILM COATED ORAL DAILY
Qty: 180 TABLET | Refills: 3 | Status: SHIPPED | OUTPATIENT
Start: 2018-10-09 | End: 2019-10-01

## 2018-10-09 NOTE — PATIENT INSTRUCTIONS
Recommend flu shot. Recommend Pneumovax 23. Recommend Tdap tetanus booster. Recommend Shingrix vaccine for shingles.    Recommend lots of fluids to prevent further kidney stones.    [facility]                        To reach your care team during and after hours:   [phone]  To reach our pharmacy:        [phone]    Clinic Hours                        Our clinic hours are:    Monday   7:30 am to 7:00 pm                  Tuesday through Friday 7:30 am to 5:00 pm                             Saturday   8:00 am to 12:00 pm      Sunday   Closed      Pharmacy Hours                        Our pharmacy hours are:    Monday   8:30 am to 7:00 pm       Tuesday to Friday  8:30 am to 6:00 pm                       Saturday    9:00 am to 1:00 pm              Sunday    Closed              There is also information available at our web site:  [email or web address]    If your provider ordered any lab tests and you do not receive the results within 10 business days, please call the clinic.    If you need a medication refill please contact your pharmacy.  Please allow 2-3 business days for your refill to be completed.    Our clinic offers telephone visits and e visits.  Please ask one of your team members to explain more.      Use [email or web address] (secure email communication and access to your chart) to send your primary care provider a message or make an appointment. Ask someone on your Team how to sign up for [employer].  Immunizations                      Immunization History   Administered Date(s) Administered     TD (ADULT, 7+) 04/01/2001        Health Maintenance                         Health Maintenance Due   Topic Date Due     Pneumovax Vaccine  03/18/1970     HIV SCREEN (SYSTEM ASSIGNED)  03/18/1986     Diabetic Foot Exam - yearly  02/16/2017     Wellness Visit with your Primary Provider - yearly  01/10/2018     Colon Cancer Screening - FIT Test - yearly  05/23/2018     Eye Exam - yearly  08/01/2018

## 2018-10-09 NOTE — MR AVS SNAPSHOT
After Visit Summary   10/9/2018    Braden De Leon    MRN: [id number]           Patient Information     Date Of Birth          1970        Visit Information        Provider Department      10/9/2018 9:20 AM Pepe Spear MD [facility]        Today's Diagnoses     Nephrolithiasis    -  1    Calculus of gallbladder without cholecystitis without obstruction        Fatigue, unspecified type        Tension headache        Nausea        Type 2 diabetes mellitus with stable proliferative retinopathy, with long-term current use of insulin, unspecified laterality (H)        Hypertension goal BP (blood pressure) < 140/90        Hyperlipidemia with target LDL less than 70        Gastroesophageal reflux disease without esophagitis        Mood changes        Class 1 obesity with serious comorbidity and body mass index (BMI) of 34.0 to 34.9 in adult, unspecified obesity type        Medication monitoring encounter        Encounter for routine adult health examination without abnormal findings        Proliferative diabetic retinopathy of both eyes without macular edema associated with type 2 diabetes mellitus (H)        Diabetic macular edema (H)        Screening for prostate cancer          Care Instructions    Recommend flu shot. Recommend Pneumovax 23. Recommend Tdap tetanus booster. Recommend Shingrix vaccine for shingles.    Recommend lots of fluids to prevent further kidney stones.    [facility]                        To reach your care team during and after hours:   [phone]  To reach our pharmacy:        [phone]    Clinic Hours                        Our clinic hours are:    Monday   7:30 am to 7:00 pm                  Tuesday through Friday 7:30 am to 5:00 pm                             Saturday   8:00 am to 12:00 pm      Sunday   Closed      Pharmacy Hours                        Our pharmacy hours are:    Monday   8:30 am to 7:00 pm       Tuesday to  Friday  8:30 am to 6:00 pm                       Saturday    9:00 am to 1:00 pm              Sunday    Closed              There is also information available at our web site:  [email or web address]    If your provider ordered any lab tests and you do not receive the results within 10 business days, please call the clinic.    If you need a medication refill please contact your pharmacy.  Please allow 2-3 business days for your refill to be completed.    Our clinic offers telephone visits and e visits.  Please ask one of your team members to explain more.      Use [employer] (secure email communication and access to your chart) to send your primary care provider a message or make an appointment. Ask someone on your Team how to sign up for [email or web address].  Immunizations                      Immunization History   Administered Date(s) Administered     TD (ADULT, 7+) 04/01/2001        Health Maintenance                         Health Maintenance Due   Topic Date Due     Pneumovax Vaccine  03/18/1970     HIV SCREEN (SYSTEM ASSIGNED)  03/18/1986     Diabetic Foot Exam - yearly  02/16/2017     Wellness Visit with your Primary Provider - yearly  01/10/2018     Colon Cancer Screening - FIT Test - yearly  05/23/2018     Eye Exam - yearly  08/01/2018               Follow-ups after your visit        Additional Services     BARIATRIC ADULT REFERRAL       Your provider has referred you to: [facility]: [facility] [phone]. [email or web address]    Please be aware that coverage of these services is subject to the terms and limitations of your health insurance plan.  Call member services at your health plan with any benefit or coverage questions.      Please bring the following with you to your appointment:      (1) List of current medications   (2) This referral request   (3) Any documents/labs given to you for this referral            SLEEP  EVALUATION & MANAGEMENT REFERRAL - [facility]  [phone] (Age 18 and up)       Please be aware that coverage of these services is subject to the terms and limitations of your health insurance plan.  Call member services at your health plan with any benefit or coverage questions.      Please bring the following to your appointment:    >>   List of current medications   >>   This referral request   >>   Any documents/labs given to you for this referral                      Follow-up notes from your care team     Return in about 1 month (around 11/9/2018), or if symptoms worsen or fail to improve, for Medication Recheck Visit.      Your next 10 appointments already scheduled     Nov 30, 2018 10:40 AM CST   PHYSICAL with Pepe Spear MD   [facility]    [address] [place] [id number] [phone]              Future tests that were ordered for you today     Open Future Orders        Priority Expected Expires Ordered    SLEEP EVALUATION & MANAGEMENT REFERRAL - [facility]  [phone] (Age 18 and up) Routine  10/9/2019 10/9/2018    Echocardiogram Complete Routine  10/9/2019 10/9/2018    Exercise Stress Echocardiogram Routine  10/9/2019 10/9/2018            Who to contact     If you have questions or need follow up information about today's clinic visit or your schedule please contact [facility] directly at [phone].  Normal or non-critical lab and imaging results will be communicated to you by MyChart, letter or phone within 4 business days after the clinic has received the results. If you do not hear from us within 7 days, please contact the clinic through MyChart or phone. If you have a critical or abnormal lab result, we will notify you by phone as soon as possible.  Submit refill requests through [employer] or call your pharmacy and they will forward the refill  request to us. Please allow 3 business days for your refill to be completed.          Additional Information About Your Visit        [employer] Information     [employer] gives you secure access to your electronic health record. If you see a primary care provider, you can also send messages to your care team and make appointments. If you have questions, please call your primary care clinic.  If you do not have a primary care provider, please call [phone] and they will assist you.        Care EveryWhere ID     This is your Care EveryWhere ID. This could be used by other organizations to access your [facility] medical records  [id number]        Your Vitals Were     Pulse Temperature Height Pulse Oximetry BMI (Body Mass Index)       96 98.9  F (37.2  C) (Oral) 6' (1.829 m) 96% 34.99 kg/m2        Blood Pressure from Last 3 Encounters:   10/09/18 (!) 156/100   10/06/18 (!) 180/95   05/14/18 141/83    Weight from Last 3 Encounters:   10/09/18 258 lb (117 kg)   05/14/18 259 lb (117.5 kg)   02/06/18 257 lb (116.6 kg)              We Performed the Following     *UA reflex to Microscopic and Culture ([facility] (except [place] and [place])     Albumin Random Urine Quantitative with Creat Ratio     BARIATRIC ADULT REFERRAL     CBC with platelets     CK total     Comprehensive metabolic panel     Hemoglobin A1c     Lipase     Lipid panel reflex to direct LDL Fasting     Prostate spec antigen screen     TSH with free T4 reflex     Vitamin B12     Vitamin D Deficiency          Today's Medication Changes          These changes are accurate as of 10/9/18 10:21 AM.  If you have any questions, ask your nurse or doctor.               These medicines have changed or have updated prescriptions.        Dose/Directions    sertraline 100 MG tablet   Commonly known as:  ZOLOFT   This may have changed:  how much to take   Used for:  Mood changes   Changed by:  Pepe Spear MD        Dose:  200 mg   Take 2 tablets (200 mg)  by mouth daily   Quantity:  180 tablet   Refills:  3         Stop taking these medicines if you haven't already. Please contact your care team if you have questions.     lisinopril 5 MG tablet   Commonly known as:  PRINIVIL/ZESTRIL   Stopped by:  Pepe Spear MD                Where to get your medicines      These medications were [facility] to [id number] - [place], [place] - [address] AT [facility] [address]  [address] [facility] [address]     Phone:  [phone]     sertraline 100 MG tablet                Primary Care Provider Office Phone # Fax #    Pepe Spear [phone]       [address] [place] [address]        Equal Access to Services     QUAN WHELAN AH: Hadii giuliana kowalski hadasho Soomaali, waaxda luqadaha, qaybta kaalmada adeegyada, waxgilbert idiin hayaan mauro lisa . So [facility] [phone].    ATENCIÓN: Si habla español, tiene a durham disposición servicios gratuitos de asistencia lingüística. [facility] [phone].    We comply with applicable federal civil rights laws and [place] laws. We do not discriminate on the basis of race, color, national origin, age, disability, sex, sexual orientation, or gender identity.            Thank you!     Thank you for choosing [facility]  for your care. Our goal is always to provide you with excellent care. Hearing back from our patients is one way we can continue to improve our services. Please take a few minutes to complete the written survey that you may receive in the mail after your visit with us. Thank you!             Your Updated Medication List - Protect others around you: Learn how to safely use, store and throw away your medicines at www.disposemymeds.org.          This list is accurate as of 10/9/18 10:21 AM.  Always use your most recent med list.                   Brand Name Dispense Instructions for use Diagnosis    aspirin 81 MG tablet     30 tablet    Take 1 tablet (81 mg) by  mouth daily    Type 2 diabetes mellitus without complication (H)       blood glucose monitoring lancets     2 Box    USE WHEN TESTING BLOOD SUGARS TWICE DAILY.    Type 2 diabetes mellitus without complication (H)       continuous blood glucose monitoring sensor     9 each    For use with Freestyle Armando Flash  for continuous monitioring of blood glucose levels. Replace sensor every 10 days.    Type 2 diabetes mellitus with stable proliferative retinopathy, with long-term current use of insulin, unspecified laterality (H)       glimepiride 4 MG tablet    AMARYL    90 tablet    Take 1/2 to full tablet by mouth daily as directed    Type 2 diabetes mellitus without complication, with long-term current use of insulin (H)       ibuprofen 800 MG tablet    ADVIL/MOTRIN    60 tablet    Take 1 tablet (800 mg) by mouth every 8 hours as needed for moderate pain        Insulin Lispro 200 UNIT/ML soln    HUMALOG KWIKPEN    30 mL    Inject 40 units in the morning, 20 units at noon, and 20 units in evening according to sliding scale.    Type 2 diabetes mellitus with stable proliferative retinopathy, with long-term current use of insulin, unspecified laterality (H)       LEVEMIR FLEXTOUCH 100 UNIT/ML injection   Generic drug:  insulin detemir     45 mL    Inject 54- 60 units sq BID, as directed, E11.9    Type 2 diabetes mellitus without complication, with long-term current use of insulin (H)       losartan 100 MG tablet    COZAAR    90 tablet    TAKE 1 TABLET BY MOUTH DAILY    Hypertension goal BP (blood pressure) < 140/90, Type 2 diabetes mellitus with stable proliferative retinopathy, with long-term current use of insulin, unspecified laterality (H)       naproxen sodium 220 MG tablet    ALEVE    180 tablet    Take 2 tablets (440 mg) by mouth At Bedtime    Medication monitoring encounter       NOVOFINE PLUS 32G X 4 MM   Generic drug:  insulin pen needle     300 each    USE AS DIRECTED    Type 2 diabetes mellitus with  stable proliferative retinopathy, with long-term current use of insulin, unspecified laterality (H)       omeprazole 40 MG capsule    priLOSEC    30 capsule    TAKE 1 CAPSULE(40 MG) BY MOUTH DAILY    Gastroesophageal reflux disease without esophagitis       ondansetron 4 MG ODT tab    ZOFRAN ODT    10 tablet    Take 1 tablet (4 mg) by mouth every 8 hours as needed for nausea or vomiting        oxyCODONE-acetaminophen 5-325 MG per tablet    PERCOCET    12 tablet    Take 1-2 tablets by mouth every 4 hours as needed for pain        pravastatin 40 MG tablet    PRAVACHOL    90 tablet    TAKE 1 TABLET(40 MG) BY MOUTH DAILY    Hyperlipidemia with target LDL less than 70, Type 2 diabetes mellitus with stable proliferative retinopathy, with long-term current use of insulin, unspecified laterality (H), Proliferative diabetic retinopathy without macular edema associated with type 2 diabetes mellitus (H)       sertraline 100 MG tablet    ZOLOFT    180 tablet    Take 2 tablets (200 mg) by mouth daily    Mood changes       tamsulosin 0.4 MG capsule    FLOMAX    10 capsule    Take 1 capsule (0.4 mg) by mouth daily for 10 doses        VIAGRA 100 MG tablet   Generic drug:  sildenafil     6 tablet    TAKE 1/2 TO 1 TABLET(50  MG) BY MOUTH DAILY AS NEEDED FOR ERECTILE DYSFUNCTION    Erectile dysfunction, unspecified erectile dysfunction type       VICTOZA PEN 18 MG/3ML soln   Generic drug:  liraglutide     9 mL    Inject 1.8 mg Subcutaneous every morning    Type 2 diabetes mellitus with moderate nonproliferative retinopathy, with long-term current use of insulin, macular edema presence unspecified, unspecified laterality (H)

## 2018-10-09 NOTE — PROGRESS NOTES
SUBJECTIVE:   Braden De Leon is a 48 year old male who presents to clinic today for the following health issues:    ED/UC Followup:    Facility:  [facility]  Date of visit: 10/6/18  Reason for visit: Ureterolithiasis  Current Status: still feeling nauseated and has a headache - did pass the kidney stone  Very fatigued     Nephrolithiasis/Cholelithiasis follow up: Patient reports sleeping 3 hours, waking up for 1 hour and then back to sleep. He does not no way he keeps waking up. Patient described headache as a tension headache with some nausea. Patient reports no pain since passing kidney stone from left side.    Type II Diabetes: Patient reports average blood glucose over the last 90 days as 137 mg/dL. Patient is currently prescribed 4 mg Glimepiride daily and Insulin injections for DM management. Patient sees Dr. Bustos of Endocrinology for further DM treatment.    Glucose   Date Value Ref Range Status   10/06/2018 291 (H) 70 - 99 mg/dL Final   05/14/2018 131 (H) 70 - 99 mg/dL Final   01/30/2018 132 (H) 70 - 99 mg/dL Final     Comment:     Fasting specimen   07/18/2017 122 (H) 65 - 99 mg/dL Final   04/11/2017 80 65 - 99 mg/dL Final     Lab Results   Component Value Date    A1C 6.7 05/14/2018    A1C 6.6 01/30/2018    A1C 6.7 07/18/2017    A1C 7.4 04/11/2017    A1C 10.0 01/10/2017     Hypertension: Patient presents today as hypertensive. His blood pressure upon physical exam was 156/100. Patient is currently prescribed 5 mg Lisinopril daily and 100 mg Losartan daily for hypertension management.    BP Readings from Last 5 Encounters:   10/09/18 (!) 156/100   10/06/18 (!) 180/95   05/14/18 141/83   02/06/18 (!) 164/91   01/30/18 138/88     Creatinine   Date Value Ref Range Status   10/06/2018 1.19 0.66 - 1.25 mg/dL Final     Hyperlipidemia: Patient is currently prescribed 40 mg Pravastatin daily for hyperlipidemia management.    Recent Labs   Lab Test  01/30/18   1110 07/18/17   05/12/15   1040  09/09/14   1017   CHOL   "134  126   < >  167  174   HDL  42  36*   < >  44  41   LDL  61  50   < >  97  86   TRIG  153*  199*   < >  129  235*   CHOLHDLRATIO   --    --    --   3.8  4.2    < > = values in this interval not displayed.     GERD: Well controlled. Patient is currently prescribed 40 mg Omeprazole daily for GERD symptom management.    Depression/Anxiety: Patient is currently prescribed 150 mg Sertraline daily for depression/anxiety management.     10/6/2018 Hospital visit:  \"HPI   Braden De Leon is a 48 year old male, who presents with his wife to the ED for the evaluation of abdominal pain and vomiting. Initially, the patient was in too much pain to give history. Brief history obtained from wife, then additional history after medication. The patient reports that at 0200 this morning he woke up and started to experience abdominal pain and that he has been vomiting ever since, prompting him to the ED. The patient notes that his pain is in the left lower quadrant of his abdomen and that it radiates to the left side of his groin. The patient also notes that after the onset of his symptoms he had a large bowel movement and felt like he had to urinate. The patient denies hematuria, dysuria, cough, fever, rhinorrhea, and sore throat.  Medical Decision Making:  Braden De Leon is a 48 year old male who presents to the emergency department with abdominal pain and vomiting.  CT demonstrates distal left ureteral stone which is consistent with his symptoms.  CT also demonstrated cholelithiasis which I discussed with the patient.  He has had fatty food intolerance suggestive of symptomatic cholelithiasis and I recommended follow-up with general surgery.  While here his blood pressure has been significantly elevated.  He has known hypertension and had not taken his medication this morning.  Finally with pain relief it is come down to only mildly elevated levels.  I will have him take his blood pressure medicine on return home and recheck " "closely in clinic.  He is discharged with urine strainer, save any stone that passes, follow-up with urology in about 3 days.\"    Problem list and histories reviewed & adjusted, as indicated.  Additional history: as documented    BP Readings from Last 3 Encounters:   10/09/18 (!) 156/100   10/06/18 (!) 180/95   05/14/18 141/83       body mass index is 34.99 kg/(m^2).    Wt Readings from Last 4 Encounters:   10/09/18 258 lb (117 kg)   05/14/18 259 lb (117.5 kg)   02/06/18 257 lb (116.6 kg)   01/30/18 257 lb (116.6 kg)       Health Maintenance    Health Maintenance Due   Topic Date Due     PNEUMOVAX 1X HI RISK PATIENT < 65 (NO IB MSG)  03/18/1970     HIV SCREEN (SYSTEM ASSIGNED)  03/18/1986     FOOT EXAM Q1 YEAR  02/16/2017     WELLNESS VISIT Q1 YR  01/10/2018     FIT Q1 YR  05/23/2018     EYE EXAM Q1 YEAR  08/01/2018       Current Problem List    Patient Active Problem List   Diagnosis     Esophageal reflux     Hypertension goal BP (blood pressure) < 140/90     Type 2 diabetes, HbA1c goal < 7% (H)     Advanced directives, counseling/discussion     Migraine     Benign neoplasm of colon     Esophagitis     Esophageal stricture     Tarsal tunnel syndrome     Other enthesopathy of ankle and tarsus     ED (erectile dysfunction)     Hyperlipidemia with target LDL less than 70     Proliferative diabetic retinopathy of both eyes without macular edema associated with type 2 diabetes mellitus (H)     Proliferative diabetic retinopathy (H)     Diabetic macular edema (H)     Type 2 diabetes mellitus with stable proliferative retinopathy, with long-term current use of insulin, unspecified laterality (H)     Cholelithiasis     Nephrolithiasis     Class 1 obesity with serious comorbidity and body mass index (BMI) of 34.0 to 34.9 in adult, unspecified obesity type       Past Medical History    Past Medical History:   Diagnosis Date     Benign neoplasm of colon 11/07, 2/12    adenomatous polyps - due 5 yrs     Cholelithiasis      " Diabetic macular edema (H)     bilateral - avastatin >10 each - Dr CANDELARIA Zuñiga     ED (erectile dysfunction)      Esophageal reflux 1995     Esophageal stricture 8/13     Esophagitis 8/13    LA Grade A     Hyperlipidemia LDL goal < 70      Hypertension goal BP (blood pressure) < 140/90 3/09     Migraine 1986    rare migraines 2/yr     Nephrolithiasis 10/2018     Obesity (BMI 30.0-34.9)      Other premature beats 11/07    bigeminy     Proliferative diabetic retinopathy (H) 06/2017    [facility] - Avastatin - Dr Rosalva Zuñiga     Type 2 diabetes, HbA1c goal < 7% (H) 1992    Dr Bustos     Unspecified gastritis and gastroduodenitis without mention of hemorrhage 1995       Past Surgical History    Past Surgical History:   Procedure Laterality Date     COLONOSCOPY  11/07, 2/12    polyps x 2 - internal hemorrhoids - adenomatous polyps - due every 5 yrs     COLONOSCOPY N/A 2/21/2017    diverticula x 1 - due 5 yrs     ESOPHAGOSCOPY, GASTROSCOPY, DUODENOSCOPY (EGD), COMBINED  8/20/2013    Esophagitis LA Grade A, esophageal stricture     HC REPAIR ING HERNIA,5+Y/O,REDUCIBL  1978    rt     STRESS ECHO (METRO)  3/12    normal       Current Medications    Current Outpatient Prescriptions   Medication Sig Dispense Refill     aspirin 81 MG tablet Take 1 tablet (81 mg) by mouth daily 30 tablet      blood glucose monitoring (SOFTCLIX) lancets USE WHEN TESTING BLOOD SUGARS TWICE DAILY. 2 Box 3     continuous blood glucose monitoring (FREESTYLE CHRISTIANE) sensor For use with Freestyle Christiane Flash  for continuous monitioring of blood glucose levels. Replace sensor every 10 days. 9 each 3     glimepiride (AMARYL) 4 MG tablet Take 1/2 to full tablet by mouth daily as directed 90 tablet 3     ibuprofen (ADVIL/MOTRIN) 800 MG tablet Take 1 tablet (800 mg) by mouth every 8 hours as needed for moderate pain 60 tablet 0     Insulin Lispro (HUMALOG KWIKPEN) 200 UNIT/ML soln Inject 40 units in the morning, 20 units at noon, and 20 units in evening  according to sliding scale. 30 mL 11     LEVEMIR FLEXTOUCH 100 UNIT/ML injection Inject 54- 60 units sq BID, as directed, E11.9 45 mL 11     losartan (COZAAR) 100 MG tablet TAKE 1 TABLET BY MOUTH DAILY 90 tablet 3     naproxen sodium (ALEVE) 220 MG tablet Take 2 tablets (440 mg) by mouth At Bedtime 180 tablet 3     NOVOFINE PLUS 32G X 4 MM USE AS DIRECTED 300 each 3     omeprazole (PRILOSEC) 40 MG capsule TAKE 1 CAPSULE(40 MG) BY MOUTH DAILY 30 capsule 1     ondansetron (ZOFRAN ODT) 4 MG ODT tab Take 1 tablet (4 mg) by mouth every 8 hours as needed for nausea or vomiting 10 tablet 0     oxyCODONE-acetaminophen (PERCOCET) 5-325 MG per tablet Take 1-2 tablets by mouth every 4 hours as needed for pain 12 tablet 0     pravastatin (PRAVACHOL) 40 MG tablet TAKE 1 TABLET(40 MG) BY MOUTH DAILY 90 tablet 1     sertraline (ZOLOFT) 100 MG tablet Take 2 tablets (200 mg) by mouth daily 180 tablet 3     tamsulosin (FLOMAX) 0.4 MG capsule Take 1 capsule (0.4 mg) by mouth daily for 10 doses 10 capsule 0     VIAGRA 100 MG tablet TAKE 1/2 TO 1 TABLET(50  MG) BY MOUTH DAILY AS NEEDED FOR ERECTILE DYSFUNCTION 6 tablet 0     VICTOZA PEN 18 MG/3ML soln Inject 1.8 mg Subcutaneous every morning 9 mL 11     [DISCONTINUED] sertraline (ZOLOFT) 100 MG tablet Take 1.5 tablets (150 mg) by mouth daily 135 tablet 3       Allergies    Allergies   Allergen Reactions     Metformin Hcl [Riomet]      Nausea and throat tightening     Penicillins      anaphylaxis     Thiethylperazine Maleate      Torecan Throat Swelling       Immunizations    Immunization History   Administered Date(s) Administered     TD (ADULT, 7+) 04/01/2001       Family History    Family History   Problem Relation Age of Onset     Hypertension Mother 58     Connective Tissue Disorder Father 70     Diabetes Father 55     Breast Cancer Maternal Grandmother      Respiratory Paternal Grandfather      Colon Cancer No family hx of        Social History    Social History     Social  History     Marital status:      Spouse name: Lela     Number of children: 1     Years of education: 13     Occupational History     manage restuarant       [employer]     Social History Main Topics     Smoking status: Never Smoker     Smokeless tobacco: Never Used     Alcohol use No     Drug use: No     Sexual activity: Yes     Partners: Female     Other Topics Concern      Service Yes     [place]     Caffeine Concern Yes     24 oz diet     Exercise Yes     3-5/wk, Ref's     Seat Belt Yes     Parent/Sibling W/ Cabg, Mi Or Angioplasty Before 65f 55m? No     Social History Narrative       All above reviewed and updated, all stable unless otherwise noted    Recent labs reviewed    ROS:  Constitutional, HEENT, cardiovascular, pulmonary, GI, , musculoskeletal, neuro, skin, endocrine and psych systems are negative, except as otherwise noted.    OBJECTIVE:                                                    BP (!) 156/100  Pulse 96  Temp 98.9  F (37.2  C) (Oral)  Ht 6' (1.829 m)  Wt 258 lb (117 kg)  SpO2 96%  BMI 34.99 kg/m2  Body mass index is 34.99 kg/(m^2).  GENERAL: healthy, alert and no distress  EYES: Eyes grossly normal to inspection  HENT:ear canals and TM's normal upon viewing with otoscope, nose and mouth without ulcers or lesions upon viewing with otoscope  RESP: lungs clear to auscultation - no rales, no rhonchi, no wheezes  CV: regular rates and rhythm, normal S1 S2, no S3 or S4 and no murmur, no click or rub -  ABDOMEN: soft, no tenderness, no  hepatosplenomegaly, no masses, normal bowel sounds  MS: extremities- no gross deformities noted, no edema  SKIN: no suspicious lesions, no rashes  NEURO: strength and tone- normal, sensory exam- grossly normal, mentation- intact, speech- normal  BACK: no CVA tenderness, no paralumbar tenderness  PSYCH: Alert and oriented times 3; speech- coherent , normal rate and volume; able to articulate logical thoughts, able to abstract reason, no  tangential thoughts, no hallucinations or delusions, affect- normal    DIAGNOSTICS/PROCEDURES:                                                      Results for orders placed or performed in visit on 10/09/18 (from the past 24 hour(s))   Vitamin B12   Result Value Ref Range    Vitamin B12 336 193 - 986 pg/mL   CBC with platelets   Result Value Ref Range    WBC 11.4 (H) 4.0 - 11.0 10e9/L    RBC Count 5.09 4.4 - 5.9 10e12/L    Hemoglobin 14.0 13.3 - 17.7 g/dL    Hematocrit 41.5 40.0 - 53.0 %    MCV 82 78 - 100 fl    MCH 27.5 26.5 - 33.0 pg    MCHC 33.7 31.5 - 36.5 g/dL    RDW 13.0 10.0 - 15.0 %    Platelet Count 203 150 - 450 10e9/L   Hemoglobin A1c   Result Value Ref [facility]    Hemoglobin A1C 6.5 (H) 0 - 5.6 %   Lipase   Result Value Ref Range    Lipase 518 (H) 73 - 393 U/L   *UA reflex to Microscopic and Culture ([place] [facility] (except [place] and [place])   Result Value Ref Range    Color Urine Yellow     Appearance Urine Clear     Glucose Urine Negative NEG^Negative mg/dL    Bilirubin Urine Negative NEG^Negative    Ketones Urine Negative NEG^Negative mg/dL    Specific Gravity Urine 1.025 1.003 - 1.035    Blood Urine Moderate (A) NEG^Negative    pH Urine 6.0 5.0 - 7.0 pH    Protein Albumin Urine >=300 (A) NEG^Negative mg/dL    Urobilinogen Urine 1.0 0.2 - 1.0 EU/dL    Nitrite Urine Negative NEG^Negative    Leukocyte Esterase Urine Negative NEG^Negative    Source Midstream Urine    Urine Microscopic   Result Value Ref [facility]    WBC Urine 0 - 5 OTO5^0 - 5 /HPF    RBC Urine 5-10 (A) OTO2^O - 2 /HPF    Bacteria Urine Few (A) NEG^Negative /HPF        ASSESSMENT/PLAN:                                                        ICD-10-CM    1. Nephrolithiasis N20.0 Lipase     Urine Microscopic   2. Calculus of gallbladder without cholecystitis without obstruction K80.20 Lipase   3. Fatigue, unspecified type R53.83 Vitamin B12     Vitamin D Deficiency     Echocardiogram Complete     Exercise Stress Echocardiogram      SLEEP EVALUATION & MANAGEMENT REFERRAL - ADULT -[facility]  [phone] (Age 18 and up)   4. Tension headache G44.209    5. Nausea R11.0    6. Type 2 diabetes mellitus with stable proliferative retinopathy, with long-term current use of insulin, unspecified laterality (H) E11.3559 Echocardiogram Complete    Z79.4 Exercise Stress Echocardiogram     Comprehensive metabolic panel     Lipid panel reflex to direct LDL Fasting     Hemoglobin A1c     Albumin Random Urine Quantitative with Creat Ratio     *UA reflex to Microscopic and Culture ([facility] (except [place] and [place])     BARIATRIC ADULT REFERRAL   7. Hypertension goal BP (blood pressure) < 140/90 I10 Echocardiogram Complete     Exercise Stress Echocardiogram     Comprehensive metabolic panel     Albumin Random Urine Quantitative with Creat Ratio     *UA reflex to Microscopic and Culture ([facility] (except [place] and [place])     BARIATRIC ADULT REFERRAL   8. Hyperlipidemia with target LDL less than 70 E78.5 Comprehensive metabolic panel     Lipid panel reflex to direct LDL Fasting     CK total     BARIATRIC ADULT REFERRAL   9. Gastroesophageal reflux disease without esophagitis K21.9    10. Mood changes R45.86 sertraline (ZOLOFT) 100 MG tablet   11. Class 1 obesity with serious comorbidity and body mass index (BMI) of 34.0 to 34.9 in adult, unspecified obesity type E66.9 BARIATRIC ADULT REFERRAL    Z68.34    12. Medication monitoring encounter Z51.81 Vitamin B12     Vitamin D Deficiency     Echocardiogram Complete     Exercise Stress Echocardiogram     Comprehensive metabolic panel     Lipid panel reflex to direct LDL Fasting     CK total     CBC with platelets     TSH with free T4 reflex     Hemoglobin A1c     Albumin Random Urine Quantitative with Creat Ratio     *UA reflex to Microscopic and Culture ([facility] (except [place] and [place])   13. Encounter for routine  adult health examination without abnormal findings Z00.00 Comprehensive metabolic panel     Lipid panel reflex to direct LDL Fasting     CK total     CBC with platelets     TSH with free T4 reflex     Prostate spec antigen screen     Hemoglobin A1c     Albumin Random Urine Quantitative with Creat Ratio     *UA reflex to Microscopic and Culture ([place] [facility] (except [place] and [place])   14. Screening for prostate cancer Z12.5 Prostate spec antigen screen     Discussed treatment/modality options, including risk and benefits, he desires advised aspirin 81 mg po daily, advised calcium 0458-9877 mg/d and Vitamin D 800-1200 IU/d, advised dentist every 6 months, advised diet, exercise, and weight loss and advised opthalmologist every 1-2 years. All diagnosis above reviewed and noted above, otherwise stable.  See [facility] orders for further details.  Follow up as needed.    1) Recommend increase fluid consumption to prevent further kidney stones.    2) Patient reports average blood glucose over the last 90 days as 137 mg/dL. Patient is currently prescribed 4 mg Glimepiride daily and Insulin injections for DM management. Advised continued use. Recommend continued follow up with Dr. Bustos for further DM treatment.    3) Patient presents today as hypertensive. His blood pressure upon physical exam was 156/100. Patient is currently prescribed 5 mg Lisinopril daily and 100 mg Losartan daily for hypertension management. Advised continued use of Losartan. Lisinopril discontinued today.    4) Patient is currently prescribed 40 mg Pravastatin daily for hyperlipidemia management. Advised continued use. Consider change to high intensity statin    5) Well controlled. Patient is currently prescribed 40 mg Omeprazole daily for GERD symptom management. Advised continued use.    6) Patient is currently prescribed 150 mg Sertraline daily for depression/anxiety management. Patient dosage increased to 200 mg Sertraline  daily for depression/anxiety management due to fatigue.    7) Patient referred to [facility] weight loss program.    8) Patient referred to sleep study due to possible sleep apnea.    9) Echocardiogram and Stress echo ordered today.    10) Recommend flu shot. Recommend Pneumovax 23. Recommend Tdap tetanus booster. Recommend Shingrix vaccine for shingles.    1) Follow up in 1 month for med check.    Health Maintenance Due   Topic Date Due     PNEUMOVAX 1X HI RISK PATIENT < 65 (NO IB MSG)  03/18/1970     HIV SCREEN (SYSTEM ASSIGNED)  03/18/1986     FOOT EXAM Q1 YEAR  02/16/2017     WELLNESS VISIT Q1 YR  01/10/2018     FIT Q1 YR  05/23/2018     EYE EXAM Q1 YEAR  08/01/2018     This document serves as a record of the services and decisions personally performed and made by Pepe Spear MD. It was created on his behalf by Eber Ernst, a trained medical scribe. The creation of this document is based on the provider's statements to the medical scribe.  Eber Ernst October 9, 2018 9:42 AM     The information in this document, created by the medical scribe for me, accurately reflects the services I personally performed and the decisions made by me. I have reviewed and approved this document for accuracy prior to leaving the patient care area.  October 9, 2018            Pepe Spear MD [address]  [address] [phone] Fax

## 2018-10-10 LAB
ALBUMIN SERPL-MCNC: 3.7 G/DL (ref 3.4–5)
ALP SERPL-CCNC: 86 U/L (ref 40–150)
ALT SERPL W P-5'-P-CCNC: 24 U/L (ref 0–70)
ANION GAP SERPL CALCULATED.3IONS-SCNC: 7 MMOL/L (ref 3–14)
AST SERPL W P-5'-P-CCNC: 29 U/L (ref 0–45)
BILIRUB SERPL-MCNC: 1 MG/DL (ref 0.2–1.3)
BUN SERPL-MCNC: 16 MG/DL (ref 7–30)
CALCIUM SERPL-MCNC: 8.7 MG/DL (ref 8.5–10.1)
CHLORIDE SERPL-SCNC: 106 MMOL/L (ref 94–109)
CHOLEST SERPL-MCNC: 148 MG/DL
CK SERPL-CCNC: 213 U/L (ref 30–300)
CO2 SERPL-SCNC: 30 MMOL/L (ref 20–32)
CREAT SERPL-MCNC: 1.08 MG/DL (ref 0.66–1.25)
CREAT UR-MCNC: 143 MG/DL
DEPRECATED CALCIDIOL+CALCIFEROL SERPL-MC: 40 UG/L (ref 20–75)
GFR SERPL CREATININE-BSD FRML MDRD: 72 ML/MIN/1.7M2
GLUCOSE SERPL-MCNC: 108 MG/DL (ref 70–99)
HDLC SERPL-MCNC: 40 MG/DL
LDLC SERPL CALC-MCNC: 77 MG/DL
MICROALBUMIN UR-MCNC: 2970 MG/L
MICROALBUMIN/CREAT UR: 2076.92 MG/G CR (ref 0–17)
NONHDLC SERPL-MCNC: 108 MG/DL
POTASSIUM SERPL-SCNC: 3.7 MMOL/L (ref 3.4–5.3)
PROT SERPL-MCNC: 7.1 G/DL (ref 6.8–8.8)
PSA SERPL-ACNC: 0.81 UG/L (ref 0–4)
SODIUM SERPL-SCNC: 143 MMOL/L (ref 133–144)
TRIGL SERPL-MCNC: 156 MG/DL
TSH SERPL DL<=0.005 MIU/L-ACNC: 0.47 MU/L (ref 0.4–4)

## 2018-10-11 DIAGNOSIS — N20.0 KIDNEY STONE: Primary | ICD-10-CM

## 2018-10-15 ENCOUNTER — OFFICE VISIT (OUTPATIENT)
Dept: UROLOGY | Facility: CLINIC | Age: 48
End: 2018-10-15
Payer: COMMERCIAL

## 2018-10-15 VITALS
WEIGHT: 258 LBS | HEART RATE: 87 BPM | DIASTOLIC BLOOD PRESSURE: 88 MMHG | SYSTOLIC BLOOD PRESSURE: 154 MMHG | BODY MASS INDEX: 34.95 KG/M2 | HEIGHT: 72 IN | OXYGEN SATURATION: 97 %

## 2018-10-15 DIAGNOSIS — R74.8 ELEVATED LIPASE: Primary | ICD-10-CM

## 2018-10-15 DIAGNOSIS — N20.0 CALCULUS OF KIDNEY: Primary | ICD-10-CM

## 2018-10-15 DIAGNOSIS — N20.0 KIDNEY STONE: ICD-10-CM

## 2018-10-15 DIAGNOSIS — R31.9 HEMATURIA: Primary | ICD-10-CM

## 2018-10-15 LAB
ALBUMIN UR-MCNC: >=300 MG/DL
APPEARANCE UR: CLEAR
BILIRUB UR QL STRIP: NEGATIVE
COLOR UR AUTO: YELLOW
GLUCOSE UR STRIP-MCNC: NEGATIVE MG/DL
HGB UR QL STRIP: ABNORMAL
HYALINE CASTS #/AREA URNS LPF: 2 /LPF (ref 0–2)
KETONES UR STRIP-MCNC: NEGATIVE MG/DL
LEUKOCYTE ESTERASE UR QL STRIP: NEGATIVE
MUCOUS THREADS #/AREA URNS LPF: PRESENT /LPF
NITRATE UR QL: NEGATIVE
PH UR STRIP: 5.5 PH (ref 5–7)
RBC #/AREA URNS AUTO: 5 /HPF (ref 0–2)
SOURCE: ABNORMAL
SP GR UR STRIP: >1.03 (ref 1–1.03)
UROBILINOGEN UR STRIP-ACNC: 0.2 EU/DL (ref 0.2–1)
WBC #/AREA URNS AUTO: 1 /HPF (ref 0–5)

## 2018-10-15 PROCEDURE — 82365 CALCULUS SPECTROSCOPY: CPT | Mod: 90 | Performed by: PHYSICIAN ASSISTANT

## 2018-10-15 PROCEDURE — 81001 URINALYSIS AUTO W/SCOPE: CPT | Performed by: PHYSICIAN ASSISTANT

## 2018-10-15 PROCEDURE — 99203 OFFICE O/P NEW LOW 30 MIN: CPT | Performed by: PHYSICIAN ASSISTANT

## 2018-10-15 PROCEDURE — 99000 SPECIMEN HANDLING OFFICE-LAB: CPT | Performed by: PHYSICIAN ASSISTANT

## 2018-10-15 RX ORDER — LISINOPRIL 5 MG/1
TABLET ORAL
COMMUNITY
Start: 2018-10-09 | End: 2018-11-30

## 2018-10-15 ASSESSMENT — PAIN SCALES - GENERAL: PAINLEVEL: NO PAIN (0)

## 2018-10-15 NOTE — PATIENT INSTRUCTIONS
Standard recommendations on kidney stone prevention:  -These include maintaining fluid intake of 3 liters per day or more with a goal of making 2 or more liters of urine per day.  If alcoholic or caffeinated beverages are consumed, you need to drink water along with these beverages to maintain hydration.    -A few ounces of lemon juice concentrate a day diluted in water can help prevent stones (citrate is a stone inhibitor).    -Sodium influences calcium excretion in the urine. Limit intake of red meat, salt, and salty processed foods.    -Uric acid-high levels in the blood can lead to kidney stones and gout, especially if the urine pH is low.  Reduce her protein intake, especially red meats.  -Weight loss, if overweight, can reduce the recurrence of kidney stones.  -Diabetes-if diabetic, you are at a greater risk of having kidney stones.  -Maintain calcium intake in diet through continued consumption of dairy products etc.    -Limit foods that are high in oxalate such as spinach, sweet potatoes, dark chocolate, soy products, and some nuts such as peanuts.   -Medications that can increase risk of kidney stones: Ephedrine, Guaifenesin, Triamterene, Lasix, Diamox, Topamax, Zonegran, laxatives.     -Additional/different recommendations pending any stone analysis.

## 2018-10-15 NOTE — NURSING NOTE
Chief Complaint   Patient presents with     Clinic Care Coordination - Follow-up     Pt here for kidney stone     Kizzy Bartlett, CMA

## 2018-10-15 NOTE — MR AVS SNAPSHOT
After Visit Summary   10/15/2018    Braden De Leon    MRN: [id number]           Patient Information     Date Of Birth          1970        Visit Information        Provider Department      10/15/2018 11:00 AM Desirae Oliveira PA-C [facility] [place]        Today's Diagnoses     Calculus of kidney    -  1    Kidney stone          Care Instructions    Standard recommendations on kidney stone prevention:  -These include maintaining fluid intake of 3 liters per day or more with a goal of making 2 or more liters of urine per day.  If alcoholic or caffeinated beverages are consumed, you need to drink water along with these beverages to maintain hydration.    -A few ounces of lemon juice concentrate a day diluted in water can help prevent stones (citrate is a stone inhibitor).    -Sodium influences calcium excretion in the urine. Limit intake of red meat, salt, and salty processed foods.    -Uric acid-high levels in the blood can lead to kidney stones and gout, especially if the urine pH is low.  Reduce her protein intake, especially red meats.  -Weight loss, if overweight, can reduce the recurrence of kidney stones.  -Diabetes-if diabetic, you are at a greater risk of having kidney stones.  -Maintain calcium intake in diet through continued consumption of dairy products etc.    -Limit foods that are high in oxalate such as spinach, sweet potatoes, dark chocolate, soy products, and some nuts such as peanuts.   -Medications that can increase risk of kidney stones: Ephedrine, Guaifenesin, Triamterene, Lasix, Diamox, Topamax, Zonegran, laxatives.     -Additional/different recommendations pending any stone analysis.            Follow-ups after your visit        Follow-up notes from your care team     Return in about 1 year (around 10/15/2019).      Your next 10 appointments already scheduled     Nov 19, 2018 11:00 AM CST   Return Visit with Vic Bustos MD   [place]  [facility] ([facility])    [address] [id number] [phone]            Nov 30, 2018 10:40 AM CST   [facility]    [address] [place] [id number] [phone]              Future tests that were ordered for you today     Open Future Orders        Priority Expected Expires Ordered    CT Abdomen Pelvis w/o Contrast Routine 10/1/2019 10/15/2019 10/15/2018            Who to contact     If you have questions or need follow up information about today's clinic visit or your schedule please contact [facility] UROLOGY [facility] directly at [phone].  Normal or non-critical lab and imaging results will be communicated to you by [employer], letter or phone within 4 business days after the clinic has received the results. If you do not hear from us within 7 days, please contact the clinic through [employer] or phone. If you have a critical or abnormal lab result, we will notify you by phone as soon as possible.  Submit refill requests through [employer] or call your pharmacy and they will forward the refill request to us. Please allow 3 business days for your refill to be completed.          Additional Information About Your Visit        [employer] Information     [employer] gives you secure access to your electronic health record. If you see a primary care provider, you can also send messages to your care team and make appointments. If you have questions, please call your primary care clinic.  If you do not have a primary care provider, please call [phone] and they will assist you.        Care EveryWhere ID     This is your Care EveryWhere ID. This could be used by other organizations to access your [facility] medical records  [id number]        Your Vitals Were     Pulse Height Pulse Oximetry BMI (Body Mass Index)          87 1.829 m (6') 97% 34.99 kg/m2          Blood Pressure from Last 3 Encounters:   10/15/18 154/88   10/09/18 (!) 156/100   10/06/18 (!) 180/95    Weight from Last 3 Encounters:   10/15/18 117 kg (258 lb)   10/09/18 117 kg (258 lb)   05/14/18 117.5 kg (259 lb)              We Performed the Following     Stone analysis [CTN600]     UA without Microscopic     Urine Micro Urologic Phys        Primary Care Provider Office Phone # Fax #    Pepe Spear [phone] [address] [facility] [address]        Equal Access to Services     [facility]: Hadii aad ku hadasho Soomaali, waaxda luqadaha, qaybta kaalmada adeegyada, nannette fontenot hayradha lisa . So [facility] [place] [phone].    ATENCIÓN: Si habla español, tiene a durham disposición servicios gratuitos de asistencia lingüística. [facility] [phone].    We comply with applicable federal civil rights laws and [place] laws. We do not discriminate on the basis of race, color, national origin, age, disability, sex, sexual orientation, or gender identity.            Thank you!     Thank you for choosing [facility]OLOG[facility] CLINIC [place]  for your care. Our goal is always to provide you with excellent care. Hearing back from our patients is one way we can continue to improve our services. Please take a few minutes to complete the written survey that you may receive in the mail after your visit with us. Thank you!             Your Updated Medication List - Protect others around you: Learn how to safely use, store and throw away your medicines at www.disposemymeds.org.          This list is accurate as of 10/15/18 11:23 AM.  Always use your most recent med list.                   Brand Name Dispense Instructions for use Diagnosis    aspirin 81 MG tablet     30 tablet    Take 1 tablet (81 mg) by mouth daily    Type 2 diabetes mellitus without complication (H)       blood glucose monitoring lancets     2 Box    USE WHEN TESTING BLOOD SUGARS TWICE DAILY.     Type 2 diabetes mellitus without complication (H)       continuous blood glucose monitoring sensor     9 each    For use with Freestyle Armando Flash  for continuous monitioring of blood glucose levels. Replace sensor every 10 days.    Type 2 diabetes mellitus with stable proliferative retinopathy, with long-term current use of insulin, unspecified laterality (H)       glimepiride 4 MG tablet    AMARYL    90 tablet    Take 1/2 to full tablet by mouth daily as directed    Type 2 diabetes mellitus without complication, with long-term current use of insulin (H)       Insulin Lispro 200 UNIT/ML soln    HUMALOG KWIKPEN    30 mL    Inject 40 units in the morning, 20 units at noon, and 20 units in evening according to sliding scale.    Type 2 diabetes mellitus with stable proliferative retinopathy, with long-term current use of insulin, unspecified laterality (H)       LEVEMIR FLEXTOUCH 100 UNIT/ML injection   Generic drug:  insulin detemir     45 mL    Inject 54- 60 units sq BID, as directed, E11.9    Type 2 diabetes mellitus without complication, with long-term current use of insulin (H)       lisinopril 5 MG tablet    PRINIVIL/ZESTRIL          losartan 100 MG tablet    COZAAR    90 tablet    TAKE 1 TABLET BY MOUTH DAILY    Hypertension goal BP (blood pressure) < 140/90, Type 2 diabetes mellitus with stable proliferative retinopathy, with long-term current use of insulin, unspecified laterality (H)       naproxen sodium 220 MG tablet    ALEVE    180 tablet    Take 2 tablets (440 mg) by mouth At Bedtime    Medication monitoring encounter       NOVOFINE PLUS 32G X 4 MM   Generic drug:  insulin pen needle     300 each    USE AS DIRECTED    Type 2 diabetes mellitus with stable proliferative retinopathy, with long-term current use of insulin, unspecified laterality (H)       omeprazole 40 MG capsule    priLOSEC    30 capsule    TAKE 1 CAPSULE(40 MG) BY MOUTH DAILY    Gastroesophageal reflux disease without esophagitis        oxyCODONE-acetaminophen 5-325 MG per tablet    PERCOCET    12 tablet    Take 1-2 tablets by mouth every 4 hours as needed for pain        pravastatin 40 MG tablet    PRAVACHOL    90 tablet    TAKE 1 TABLET(40 MG) BY MOUTH DAILY    Hyperlipidemia with target LDL less than 70, Type 2 diabetes mellitus with stable proliferative retinopathy, with long-term current use of insulin, unspecified laterality (H), Proliferative diabetic retinopathy without macular edema associated with type 2 diabetes mellitus (H)       sertraline 100 MG tablet    ZOLOFT    180 tablet    Take 2 tablets (200 mg) by mouth daily    Mood changes       tamsulosin 0.4 MG capsule    FLOMAX    10 capsule    Take 1 capsule (0.4 mg) by mouth daily for 10 doses        VIAGRA 100 MG tablet   Generic drug:  sildenafil     6 tablet    TAKE 1/2 TO 1 TABLET(50  MG) BY MOUTH DAILY AS NEEDED FOR ERECTILE DYSFUNCTION    Erectile dysfunction, unspecified erectile dysfunction type       VICTOZA PEN 18 MG/3ML soln   Generic drug:  liraglutide     9 mL    Inject 1.8 mg Subcutaneous every morning    Type 2 diabetes mellitus with moderate nonproliferative retinopathy, with long-term current use of insulin, macular edema presence unspecified, unspecified laterality (H)

## 2018-10-15 NOTE — LETTER
10/15/2018       RE: Braden De Leon  [address] [place] [address]     Dear Colleague,    Thank you for referring your patient, Braden De Leon, to the [facility] UROLOGY CLINIC [place] at [facility]. Please see a copy of my visit note below.    CC: kidney stone.    HPI: It is a pleasure to see . Braden De Leon, a 48 year old male seen today in the urology clinic in consultation from Dr. Gonzalez of  ER for evaluation of the above. This was first detected on CT in the ED on 10/6/18 after the patient presented complaining of acute renal colic symptoms. The stone measured 4 mm and was located in the distal left ureter with associated hydronephrosis. UA in the ED was negative for infection. BMP revealed Cr and Ca to be normal. With pain under good control, the patient was discharged with a prescription for tamsulosin and instructions to follow up with urology.    Currently, the patient reports resolution of symptoms. Brought stone in for analysis. Denies gross hematuria, dysuria, fevers, chills, N/V. No prior history of kidney stones.    RECENT IMAGING:  CT ABDOMEN AND PELVIS WITHOUT CONTRAST   10/6/2018 9:08 AM      HISTORY: Left lower quadrant pain, question stone versus other.      TECHNIQUE:   No IV contrast material. Radiation dose for this scan was  reduced using automated exposure control, adjustment of the mA and/or  kV according to patient size, or iterative reconstruction technique.     COMPARISON: None.     FINDINGS:   The lung bases are clear. The heart is enlarged.     There is a tiny hyperdensity in the dependent aspect of the  gallbladder, likely a gallstone. Within the limits of noncontrast  technique, no acute abnormality in the liver, spleen, pancreas, or  adrenal glands. The large and small bowel are normal in caliber. The  appendix is normal. There is trace pelvic ascites. Mild  atherosclerotic changes in the aorta but no  evidence of aneurysm.     There are several nonobstructing right renal calculi. Single tiny  stone in the lower pole of the left kidney. There is mild left  hydronephrosis, and mild dilation of the left ureter extending to the  level of the left distal ureter where there is a 4 mm stone just above  the ureterovesical junction. There is moderate bilateral perinephric  stranding.          IMPRESSION:   1. 4 mm stone in the distal left ureter just above the ureterovesical  junction. This causes mild left hydronephrosis.  2. Bilateral nephrolithiasis.  3. Cholelithiasis.    Past Medical History:   Diagnosis Date     Benign neoplasm of colon 11/07, 2/12    adenomatous polyps - due 5 yrs     Cholelithiasis      Diabetic macular edema (H)     bilateral - avastatin >10 each - Dr CANDELARIA Zuñiga     ED (erectile dysfunction)      Esophageal reflux 1995     Esophageal stricture 8/13     Esophagitis 8/13    LA Grade A     Hyperlipidemia LDL goal < 70      Hypertension goal BP (blood pressure) < 140/90 3/09     Migraine 1986    rare migraines 2/yr     Nephrolithiasis 10/2018     Obesity (BMI 30.0-34.9)      Other premature beats 11/07    bigeminy     Proliferative diabetic retinopathy (H) 06/2017    [facility] - Avastatin - Dr Rosalva Zuñiga     Type 2 diabetes, HbA1c goal < 7% (H) 1992    Dr Bustos     Unspecified gastritis and gastroduodenitis without mention of hemorrhage 1995       Past Surgical History:   Procedure Laterality Date     COLONOSCOPY  11/07, 2/12    polyps x 2 - internal hemorrhoids - adenomatous polyps - due every 5 yrs     COLONOSCOPY N/A 2/21/2017    diverticula x 1 - due 5 yrs     ESOPHAGOSCOPY, GASTROSCOPY, DUODENOSCOPY (EGD), COMBINED  8/20/2013    Esophagitis LA Grade A, esophageal stricture     HC REPAIR ING HERNIA,5+Y/O,REDUCIBL  1978    rt     STRESS ECHO (METRO)  3/12    normal       Current Outpatient Prescriptions   Medication Sig Dispense Refill     aspirin 81 MG tablet Take 1 tablet (81 mg) by mouth daily 30  tablet      blood glucose monitoring (SOFTCLIX) lancets USE WHEN TESTING BLOOD SUGARS TWICE DAILY. 2 Box 3     continuous blood glucose monitoring (FREESTYLE CHRISTIANE) sensor For use with Freestyle Christiane Flash  for continuous monitioring of blood glucose levels. Replace sensor every 10 days. 9 each 3     glimepiride (AMARYL) 4 MG tablet Take 1/2 to full tablet by mouth daily as directed 90 tablet 3     Insulin Lispro (HUMALOG KWIKPEN) 200 UNIT/ML soln Inject 40 units in the morning, 20 units at noon, and 20 units in evening according to sliding scale. 30 mL 11     LEVEMIR FLEXTOUCH 100 UNIT/ML injection Inject 54- 60 units sq BID, as directed, E11.9 45 mL 11     losartan (COZAAR) 100 MG tablet TAKE 1 TABLET BY MOUTH DAILY 90 tablet 3     naproxen sodium (ALEVE) 220 MG tablet Take 2 tablets (440 mg) by mouth At Bedtime 180 tablet 3     NOVOFINE PLUS 32G X 4 MM USE AS DIRECTED 300 each 3     omeprazole (PRILOSEC) 40 MG capsule TAKE 1 CAPSULE(40 MG) BY MOUTH DAILY 30 capsule 1     oxyCODONE-acetaminophen (PERCOCET) 5-325 MG per tablet Take 1-2 tablets by mouth every 4 hours as needed for pain 12 tablet 0     pravastatin (PRAVACHOL) 40 MG tablet TAKE 1 TABLET(40 MG) BY MOUTH DAILY 90 tablet 1     sertraline (ZOLOFT) 100 MG tablet Take 2 tablets (200 mg) by mouth daily 180 tablet 3     tamsulosin (FLOMAX) 0.4 MG capsule Take 1 capsule (0.4 mg) by mouth daily for 10 doses 10 capsule 0     VIAGRA 100 MG tablet TAKE 1/2 TO 1 TABLET(50  MG) BY MOUTH DAILY AS NEEDED FOR ERECTILE DYSFUNCTION 6 tablet 0     VICTOZA PEN 18 MG/3ML soln Inject 1.8 mg Subcutaneous every morning 9 mL 11       Allergies   Allergen Reactions     Metformin Hcl [Riomet]      Nausea and throat tightening     Penicillins      anaphylaxis     Thiethylperazine Maleate      Torecan Throat Swelling       FAMILY HISTORY: There is no family h/o  malignancy.  There is no family h/o urolithiasis.     SOCIAL HISTORY: The patient does not smoke  cigarettes. Denies EtOH and illicit drug abuse.     GENERAL PHYSICAL EXAM:   /88 (BP Location: Left arm, Patient Position: Sitting, Cuff Size: Adult Regular)  Pulse 87  Ht 1.829 m (6')  Wt 117 kg (258 lb)  SpO2 97%  BMI 34.99 kg/m2    GEN: NAD, lying in bed  EYES: EOMI  MOUTH: MMM  NECK: Supple  RESP: Unlabored breathing  CARDIAC: No LE edema  SKIN: Warm  ABD: soft  NEURO: AAO    UA today moderate blood.    ASSESSMENT AND PLAN: 48 year old male with a passed stone and bilateral nonobstructing stones.   -Stone analysis. (send via mail)  - Warning signs discussed including fevers, chills, N/V, gross hematuria, severe pain that is refractory to medications which should prompt more urgent evaluation. Patient understands to proceed to the ER should these warning signs occur outside of clinic hours.  -Urine MIcro, if abnormal will recheck in 4 wks.   -RTO 1 year to monitor size of bilateral nonobstructing renal stones.     Standard recommendations on kidney stone prevention were provided.    Desirae Oliveira PA-C  [facility] Urology    30 minutes were spent with the patient today, > 50% in counseling and coordination of care of kidney stone.

## 2018-10-15 NOTE — LETTER
Patient:  Braden De Leon  :   1970  MRN:     [id number]        Mr.Erik SMITA De Leon  [address] [facility] [address]        2018    Dear ,    We are writing to inform you of your test results. Below are some foods that you should avoid eating an drinking while on a low oxalate diet per Desirae FLORES.   Below are some foods that you should avoid eating and drinking while on a low oxalate diet:     Spinach   Rhubarb   Beets   Strawberries   Nuts (almonds, peanuts, walnuts, hazelnuts, and pecans)   Chocolate   Tea (green, black, iced, or instant)   Coffee   Cola   Wheat bran   White corn grits   Wheat germ   Whole-wheat flour   Berries (blackberries, gooseberries, black raspberries)   [place] grapes   Red currants   Damson plums   Peels from geeta, limes and oranges   Tangerines   Fruitcake or other desserts that contain the fruits listed above     Protein foods:   Baked beans   Peanut butter   Tofu   Sweet potatoes   Vegetable soups that contain very few vegetables   Beans (wax, legume and soy)   Celery   Dark, leafy greens (swiss chard, endive, escarole, parsley)   Eggplant   Leeks   Summer squash     Beverages:   Draft beer                    Resulted Orders   UA without Microscopic   Result Value Ref Range    Color Urine Yellow     Appearance Urine Clear     Glucose Urine Negative NEG^Negative mg/dL    Bilirubin Urine Negative NEG^Negative    Ketones Urine Negative NEG^Negative mg/dL    Specific Gravity Urine >1.030 1.003 - 1.035    Blood Urine Moderate (A) NEG^Negative    pH Urine 5.5 5.0 - 7.0 pH    Protein Albumin Urine >=300 (A) NEG^Negative mg/dL    Urobilinogen Urine 0.2 0.2 - 1.0 EU/dL    Nitrite Urine Negative NEG^Negative    Leukocyte Esterase Urine Negative NEG^Negative    Source Midstream Urine    Stone analysis [QIY654]   Result Value Ref Range    Stone Composition SEE NOTE       Comment:      (Note)  Calculi composed primarily of  calcium oxalate  monohydrate.  INTERPRETIVE INFORMATION: Calculi (Stone) analysis  Calculi are the products of physiological processes that   yield crystalline compounds in a matrix of biological   compounds and blood.  Matrix components are not reported.    The clinically significant crystalline components   identified in calculi specimens are reported.  Gross   description may not be consistent with composition   determined by FTIR analysis.  Performed by [facility],  [address] [place] [address] [phone]  [email or web address] Nithin Cantu MD, Lab. Director      Calculi Number 1     Calculi Size 1 to 4 mm    Calculi Description SEE NOTE       Comment:      (Note)  Specimen consists of a single, small,  brown, irregular calculus.      Stone Mass 35 mg   Urine Micro Urologic Phys   Result Value Ref Range    WBC Urine 1 0 - 5 /HPF    RBC Urine 5 (H) 0 - 2 /HPF    Mucous Urine Present (A) NEG^Negative /LPF    Hyaline Casts 2 0 - 2 /LPF                     [id number]  1970

## 2018-10-15 NOTE — PROGRESS NOTES
CC: kidney stone.    HPI: It is a pleasure to see . Braden De Leon, a 48 year old male seen today in the urology clinic in consultation from Dr. Gonzalez of  ER for evaluation of the above. This was first detected on CT in the ED on 10/6/18 after the patient presented complaining of acute renal colic symptoms. The stone measured 4 mm and was located in the distal left ureter with associated hydronephrosis. UA in the ED was negative for infection. BMP revealed Cr and Ca to be normal. With pain under good control, the patient was discharged with a prescription for tamsulosin and instructions to follow up with urology.    Currently, the patient reports resolution of symptoms. Brought stone in for analysis. Denies gross hematuria, dysuria, fevers, chills, N/V. No prior history of kidney stones.    RECENT IMAGING:  CT ABDOMEN AND PELVIS WITHOUT CONTRAST   10/6/2018 9:08 AM      HISTORY: Left lower quadrant pain, question stone versus other.      TECHNIQUE:   No IV contrast material. Radiation dose for this scan was  reduced using automated exposure control, adjustment of the mA and/or  kV according to patient size, or iterative reconstruction technique.     COMPARISON: None.     FINDINGS:   The lung bases are clear. The heart is enlarged.     There is a tiny hyperdensity in the dependent aspect of the  gallbladder, likely a gallstone. Within the limits of noncontrast  technique, no acute abnormality in the liver, spleen, pancreas, or  adrenal glands. The large and small bowel are normal in caliber. The  appendix is normal. There is trace pelvic ascites. Mild  atherosclerotic changes in the aorta but no evidence of aneurysm.     There are several nonobstructing right renal calculi. Single tiny  stone in the lower pole of the left kidney. There is mild left  hydronephrosis, and mild dilation of the left ureter extending to the  level of the left distal ureter where there is a 4 mm stone just above  the ureterovesical  junction. There is moderate bilateral perinephric  stranding.          IMPRESSION:   1. 4 mm stone in the distal left ureter just above the ureterovesical  junction. This causes mild left hydronephrosis.  2. Bilateral nephrolithiasis.  3. Cholelithiasis.    Past Medical History:   Diagnosis Date     Benign neoplasm of colon 11/07, 2/12    adenomatous polyps - due 5 yrs     Cholelithiasis      Diabetic macular edema (H)     bilateral - avastatin >10 each - Dr CANDELARIA Zuñiga     ED (erectile dysfunction)      Esophageal reflux 1995     Esophageal stricture 8/13     Esophagitis 8/13    LA Grade A     Hyperlipidemia LDL goal < 70      Hypertension goal BP (blood pressure) < 140/90 3/09     Migraine 1986    rare migraines 2/yr     Nephrolithiasis 10/2018     Obesity (BMI 30.0-34.9)      Other premature beats 11/07    bigeminy     Proliferative diabetic retinopathy (H) 06/2017    [facility] - Avastatin - Dr Rosalva Zuñiga     Type 2 diabetes, HbA1c goal < 7% (H) 1992    Dr Bustos     Unspecified gastritis and gastroduodenitis without mention of hemorrhage 1995       Past Surgical History:   Procedure Laterality Date     COLONOSCOPY  11/07, 2/12    polyps x 2 - internal hemorrhoids - adenomatous polyps - due every 5 yrs     COLONOSCOPY N/A 2/21/2017    diverticula x 1 - due 5 yrs     ESOPHAGOSCOPY, GASTROSCOPY, DUODENOSCOPY (EGD), COMBINED  8/20/2013    Esophagitis LA Grade A, esophageal stricture     HC REPAIR ING HERNIA,5+Y/O,REDUCIBL  1978    rt     STRESS ECHO (METRO)  3/12    normal       Current Outpatient Prescriptions   Medication Sig Dispense Refill     aspirin 81 MG tablet Take 1 tablet (81 mg) by mouth daily 30 tablet      blood glucose monitoring (SOFTCLIX) lancets USE WHEN TESTING BLOOD SUGARS TWICE DAILY. 2 Box 3     continuous blood glucose monitoring (FREESTYLE CHRISTIANE) sensor For use with Freestyle Christiane Flash  for continuous monitioring of blood glucose levels. Replace sensor every 10 days. 9 each 3      glimepiride (AMARYL) 4 MG tablet Take 1/2 to full tablet by mouth daily as directed 90 tablet 3     Insulin Lispro (HUMALOG KWIKPEN) 200 UNIT/ML soln Inject 40 units in the morning, 20 units at noon, and 20 units in evening according to sliding scale. 30 mL 11     LEVEMIR FLEXTOUCH 100 UNIT/ML injection Inject 54- 60 units sq BID, as directed, E11.9 45 mL 11     losartan (COZAAR) 100 MG tablet TAKE 1 TABLET BY MOUTH DAILY 90 tablet 3     naproxen sodium (ALEVE) 220 MG tablet Take 2 tablets (440 mg) by mouth At Bedtime 180 tablet 3     NOVOFINE PLUS 32G X 4 MM USE AS DIRECTED 300 each 3     omeprazole (PRILOSEC) 40 MG capsule TAKE 1 CAPSULE(40 MG) BY MOUTH DAILY 30 capsule 1     oxyCODONE-acetaminophen (PERCOCET) 5-325 MG per tablet Take 1-2 tablets by mouth every 4 hours as needed for pain 12 tablet 0     pravastatin (PRAVACHOL) 40 MG tablet TAKE 1 TABLET(40 MG) BY MOUTH DAILY 90 tablet 1     sertraline (ZOLOFT) 100 MG tablet Take 2 tablets (200 mg) by mouth daily 180 tablet 3     tamsulosin (FLOMAX) 0.4 MG capsule Take 1 capsule (0.4 mg) by mouth daily for 10 doses 10 capsule 0     VIAGRA 100 MG tablet TAKE 1/2 TO 1 TABLET(50  MG) BY MOUTH DAILY AS NEEDED FOR ERECTILE DYSFUNCTION 6 tablet 0     VICTOZA PEN 18 MG/3ML soln Inject 1.8 mg Subcutaneous every morning 9 mL 11       Allergies   Allergen Reactions     Metformin Hcl [Riomet]      Nausea and throat tightening     Penicillins      anaphylaxis     Thiethylperazine Maleate      Torecan Throat Swelling       FAMILY HISTORY: There is no family h/o  malignancy.  There is no family h/o urolithiasis.     SOCIAL HISTORY: The patient does not smoke cigarettes. Denies EtOH and illicit drug abuse.     ROS: A comprehensive 14 point ROS was obtained and otherwise negative except for that outlined above in the HPI.    GENERAL PHYSICAL EXAM:   /88 (BP Location: Left arm, Patient Position: Sitting, Cuff Size: Adult Regular)  Pulse 87  Ht 1.829 m (6')  Wt 117  kg (258 lb)  SpO2 97%  BMI 34.99 kg/m2    GEN: NAD, lying in bed  EYES: EOMI  MOUTH: MMM  NECK: Supple  RESP: Unlabored breathing  CARDIAC: No LE edema  SKIN: Warm  ABD: soft  NEURO: AAO    UA today moderate blood.    ASSESSMENT AND PLAN: 48 year old male with a passed stone and bilateral nonobstructing stones.   -Stone analysis. (send via mail)  - Warning signs discussed including fevers, chills, N/V, gross hematuria, severe pain that is refractory to medications which should prompt more urgent evaluation. Patient understands to proceed to the ER should these warning signs occur outside of clinic hours.  -Urine MIcro, if abnormal will recheck in 4 wks.   -RTO 1 year to monitor size of bilateral nonobstructing renal stones.     Standard recommendations on kidney stone prevention were provided.    Desirae Oliveira PA-C  [facility]    30 minutes were spent with the patient today, > 50% in counseling and coordination of care of kidney stone.

## 2018-10-18 LAB
APPEARANCE STONE: NORMAL
COMPN STONE: NORMAL
NUMBER STONE: 1
SIZE STONE: NORMAL MM
WT STONE: 35 MG

## 2018-10-29 DIAGNOSIS — N52.9 ERECTILE DYSFUNCTION, UNSPECIFIED ERECTILE DYSFUNCTION TYPE: ICD-10-CM

## 2018-10-29 RX ORDER — SILDENAFIL 100 MG/1
TABLET, FILM COATED ORAL
Qty: 6 TABLET | Refills: 0 | Status: SHIPPED | OUTPATIENT
Start: 2018-10-29 | End: 2018-11-30

## 2018-10-29 NOTE — TELEPHONE ENCOUNTER
Refill approved through [facility] protocol.  Jolanta Thompson RN  [facility]  [phone]

## 2018-10-29 NOTE — TELEPHONE ENCOUNTER
"Requested Prescriptions   Pending Prescriptions Disp Refills     sildenafil (VIAGRA) 100 MG tablet [Pharmacy Med Name: SILDENAFIL 100MG TABLETS] 6 tablet 0        Last Written Prescription Date:  12.6.17  Last Fill Quantity: 6,  # refills: 0   Last Office Visit: 10/9/2018   Future Office Visit:    Next 5 appointments (look out 90 days)     Nov 19, 2018 11:00 AM CST   Return Visit with Vic Bustos MD   [facility] ([address] [id number] [phone]            Nov 30, 2018 10:40 AM CST   [facility]    [address] [place] [id number] [phone]                  Sig: TAKE 1/2 TO 1 TABLET(50  MG) BY MOUTH DAILY AS NEEDED FOR ERECTILE DYSFUNCTION    Erectile Dysfuction Protocol Passed    10/29/2018 12:43 PM       Passed - Absence of nitrates on medication list       Passed - Absence of Alpha Blockers on Med list       Passed - Recent (12 mo) or future (30 days) visit within the authorizing provider's specialty    Patient had office visit in the last 12 months or has a visit in the next 30 days with authorizing provider or within the authorizing provider's specialty.  See \"Patient Info\" tab in inbasket, or \"Choose Columns\" in Meds & Orders section of the refill encounter.             Passed - Patient is age 18 or older          "

## 2018-11-05 DIAGNOSIS — I10 HYPERTENSION GOAL BP (BLOOD PRESSURE) < 140/90: ICD-10-CM

## 2018-11-05 DIAGNOSIS — Z79.4 TYPE 2 DIABETES MELLITUS WITH STABLE PROLIFERATIVE RETINOPATHY, WITH LONG-TERM CURRENT USE OF INSULIN, UNSPECIFIED LATERALITY (H): ICD-10-CM

## 2018-11-05 DIAGNOSIS — E11.3559 TYPE 2 DIABETES MELLITUS WITH STABLE PROLIFERATIVE RETINOPATHY, WITH LONG-TERM CURRENT USE OF INSULIN, UNSPECIFIED LATERALITY (H): ICD-10-CM

## 2018-11-05 RX ORDER — LOSARTAN POTASSIUM 100 MG/1
TABLET ORAL
Qty: 90 TABLET | Refills: 0 | Status: SHIPPED | OUTPATIENT
Start: 2018-11-05 | End: 2018-11-30

## 2018-11-05 NOTE — TELEPHONE ENCOUNTER
"Requested Prescriptions   Pending Prescriptions Disp Refills     losartan (COZAAR) 100 MG tablet [Pharmacy Med Name: LOSARTAN 100MG TABLETS] 90 tablet 0      Last Written Prescription Date:  11.28.17  Last Fill Quantity: 90,  # refills: 3   Last Office Visit: 10/9/2018   Future Office Visit:    Next 5 appointments (look out 90 days)     Nov 19, 2018 11:00 AM CST   [facility]    [address] [place] [id number] [phone]            Nov 30, 2018 10:40 AM CST   PHYSICAL with Pepe Spear MD   [facility]    [address] [place] [phone]                  Sig: TAKE 1 TABLET BY MOUTH DAILY    Angiotensin-II Receptors Failed    11/5/2018  3:27 AM       Failed - Blood pressure under 140/90 in past 12 months    BP Readings from Last 3 Encounters:   10/15/18 154/88   10/09/18 (!) 156/100   10/06/18 (!) 180/95                Passed - Recent (12 mo) or future (30 days) visit within the authorizing provider's specialty    Patient had office visit in the last 12 months or has a visit in the next 30 days with authorizing provider or within the authorizing provider's specialty.  See \"Patient Info\" tab in inbasket, or \"Choose Columns\" in Meds & Orders section of the refill encounter.             Passed - Patient is age 18 or older       Passed - Normal serum creatinine on file in past 12 months    Recent Labs   Lab Test  10/09/18   1031   CR  1.08            Passed - Normal serum potassium on file in past 12 months    Recent Labs   Lab Test  10/09/18   1031   POTASSIUM  3.7                      "

## 2018-11-05 NOTE — TELEPHONE ENCOUNTER
Prescription approved per [facility] Refill Protocol.  Pt has upcoming OV scheduled.  aSrah Early RN  [facility]

## 2018-11-19 ENCOUNTER — OFFICE VISIT (OUTPATIENT)
Dept: ENDOCRINOLOGY | Facility: CLINIC | Age: 48
End: 2018-11-19
Payer: COMMERCIAL

## 2018-11-19 VITALS
HEIGHT: 72 IN | BODY MASS INDEX: 34.86 KG/M2 | SYSTOLIC BLOOD PRESSURE: 140 MMHG | WEIGHT: 257.4 LBS | HEART RATE: 87 BPM | DIASTOLIC BLOOD PRESSURE: 81 MMHG

## 2018-11-19 DIAGNOSIS — E11.3559 TYPE 2 DIABETES MELLITUS WITH STABLE PROLIFERATIVE RETINOPATHY, WITH LONG-TERM CURRENT USE OF INSULIN, UNSPECIFIED LATERALITY (H): Primary | ICD-10-CM

## 2018-11-19 DIAGNOSIS — E11.9 TYPE 2 DIABETES MELLITUS WITHOUT COMPLICATION, WITH LONG-TERM CURRENT USE OF INSULIN (H): ICD-10-CM

## 2018-11-19 DIAGNOSIS — E11.40 TYPE 2 DIABETES MELLITUS WITH DIABETIC NEUROPATHY, WITH LONG-TERM CURRENT USE OF INSULIN (H): ICD-10-CM

## 2018-11-19 DIAGNOSIS — Z79.4 TYPE 2 DIABETES MELLITUS WITHOUT COMPLICATION, WITH LONG-TERM CURRENT USE OF INSULIN (H): ICD-10-CM

## 2018-11-19 DIAGNOSIS — E11.21 TYPE 2 DIABETES MELLITUS WITH DIABETIC NEPHROPATHY, WITH LONG-TERM CURRENT USE OF INSULIN (H): ICD-10-CM

## 2018-11-19 DIAGNOSIS — Z79.4 TYPE 2 DIABETES MELLITUS WITH DIABETIC NEUROPATHY, WITH LONG-TERM CURRENT USE OF INSULIN (H): ICD-10-CM

## 2018-11-19 DIAGNOSIS — Z79.4 TYPE 2 DIABETES MELLITUS WITH DIABETIC NEPHROPATHY, WITH LONG-TERM CURRENT USE OF INSULIN (H): ICD-10-CM

## 2018-11-19 DIAGNOSIS — Z79.4 TYPE 2 DIABETES MELLITUS WITH STABLE PROLIFERATIVE RETINOPATHY, WITH LONG-TERM CURRENT USE OF INSULIN, UNSPECIFIED LATERALITY (H): Primary | ICD-10-CM

## 2018-11-19 PROBLEM — E11.3593 PROLIFERATIVE DIABETIC RETINOPATHY OF BOTH EYES WITHOUT MACULAR EDEMA ASSOCIATED WITH TYPE 2 DIABETES MELLITUS (H): Status: RESOLVED | Noted: 2017-06-27 | Resolved: 2018-11-19

## 2018-11-19 PROCEDURE — 99214 OFFICE O/P EST MOD 30 MIN: CPT | Performed by: INTERNAL MEDICINE

## 2018-11-19 PROCEDURE — 95251 CONT GLUC MNTR ANALYSIS I&R: CPT | Performed by: INTERNAL MEDICINE

## 2018-11-19 RX ORDER — GLIMEPIRIDE 4 MG/1
TABLET ORAL
Qty: 135 TABLET | Refills: 3 | Status: SHIPPED | OUTPATIENT
Start: 2018-11-19 | End: 2019-11-12

## 2018-11-19 NOTE — PROGRESS NOTES
Name: Braden De Leon      Chief Complaint   Patient presents with     Diabetes     Type 2     HPI:  Recent issues:  Here for f/u diabetes evaluation.    Feeling well overall but had kidney stone 10/2018  Reviewed recent lab tests done at  [facility]        1997. Diagnosis of T2DM  Initial treatment with Glucophage x several months, but developed GI symptoms and discontinued  Changed to Glucotrol, then addition of Avandia  3/2005. Saw Dr. СЕРГЕЙ Luevano/Endocrinology  Previous hgbA1c's 8.4-9.1% range  Subsequent addition of basal insulin as QD... Then BID dosing routine  Has used Lantus, Levemir, then Tresiba (QD) and then Levemir.  Tresiba non formulary  Current DM meds:   Levemir 54U sq QAM and 58U sq QPM   Humalog as H40- H20 (-35)- H20 (-25) schedule    Humalog sscale 5U per 50>150    glimeparide 4 mg 1-tabs QPM    Victoza 1.2 mg sq QD (recalls that his pharmacy/insurance fills 1.8 mg dose)    Using Accucheck ? Meter, uses occasionally  12/2017 Began use of Freestyle Armando Personal CGMS device, likes it   Recent avg 14d 119 mg/dl, 30d 119 mg/dl  Previous  labs include:  Lab Results   Component Value Date    A1C 6.5 (H) 10/09/2018     10/09/2018    POTASSIUM 3.7 10/09/2018    CHLORIDE 106 10/09/2018    CO2 30 10/09/2018    ANIONGAP 7 10/09/2018     (H) 10/09/2018    BUN 16 10/09/2018    CR 1.08 10/09/2018    GFRESTIMATED 72 10/09/2018    GFRESTBLACK 87 10/09/2018    SILVIANO 8.7 10/09/2018    CHOL 148 10/09/2018    TRIG 156 (H) 10/09/2018    HDL 40 10/09/2018    LDL 77 10/09/2018    NHDL 108 10/09/2018    UCRR 143 10/09/2018    MICROL 2970 10/09/2018    UMALCR 2076.92 (H) 10/09/2018     DM Complications:   Retinopathy    Previous laser PC? and Avastin eye injections OU    Sees Dr. BRIE Zuñiga/[facility]   Nephropathy    Microalbuminuria    Taking losartan daily (and lisinopril discontinued Fall '19)   Neuropathy    Decreased sensation vs numbness at feet      , wife Lela.  He works at  [employer]   Sees Dr. MAI Spear/ [facility] for gen med evaluations    PMH/PSH:  Past Medical History:   Diagnosis Date     Benign neoplasm of colon 11/07, 2/12    adenomatous polyps - due 5 yrs     Cholelithiasis      Diabetic macular edema (H)     bilateral - avastatin >10 each - Dr CANDELARIA Zuñiga     ED (erectile dysfunction)      Esophageal reflux 1995     Esophageal stricture 8/13     Esophagitis 8/13    LA Grade A     Hyperlipidemia LDL goal < 70      Hypertension goal BP (blood pressure) < 140/90 3/09     Migraine 1986    rare migraines 2/yr     Nephrolithiasis 10/2018    4 mm - urology     Obesity (BMI 30.0-34.9)      Other premature beats 11/07    bigeminy     Proliferative diabetic retinopathy (H) 06/2017    [facility] - Avastatin - Dr Rosalva Zuñiga     Type 2 diabetes, HbA1c goal < 7% (H) 1992    Dr Bustos     Unspecified gastritis and gastroduodenitis without mention of hemorrhage 1995     Past Surgical History:   Procedure Laterality Date     COLONOSCOPY  11/07, 2/12    polyps x 2 - internal hemorrhoids - adenomatous polyps - due every 5 yrs     COLONOSCOPY N/A 2/21/2017    diverticula x 1 - due 5 yrs     ESOPHAGOSCOPY, GASTROSCOPY, DUODENOSCOPY (EGD), COMBINED  8/20/2013    Esophagitis LA Grade A, esophageal stricture     HC REPAIR ING HERNIA,5+Y/O,REDUCIBL  1978    rt     STRESS ECHO (METRO)  3/12    normal       Family Hx:  Family History   Problem Relation Age of Onset     Hypertension Mother 58     Connective Tissue Disorder Father 70     Diabetes Father 55     Breast Cancer Maternal Grandmother      Respiratory Paternal Grandfather      Colon Cancer No family hx of          Social Hx:  Social History     Social History     Marital status:      Spouse name: Lela     Number of children: 1     Years of education: 13     Occupational History     manage [employer]     Social History Main Topics     Smoking status: Never Smoker     Smokeless  tobacco: Never Used     Alcohol use No     Drug use: No     Sexual activity: Yes     Partners: Female     Other Topics Concern      Service Yes     [place]     Caffeine Concern Yes     24 oz diet     Exercise Yes     3-5/wk, Ref's     Seat Belt Yes     Parent/Sibling W/ Cabg, Mi Or Angioplasty Before 65f 55m? No     Social History Narrative          MEDICATIONS:  has a current medication list which includes the following prescription(s): aspirin, blood glucose monitoring, continuous blood glucose monitoring, glimepiride, insulin lispro, levemir flextouch, losartan, naproxen sodium, novofine plus, omeprazole, oxycodone-acetaminophen, pravastatin, sertraline, sildenafil, victoza pen, and lisinopril.    ROS:     ROS: 10 point ROS neg other than the symptoms noted above in the HPI.    GENERAL: mild fatigue; no weight changes, fevers, chills, night sweats.   HEENT: reports good vision acuity; no dysphagia, odonophagia, diplopia  THYROID:  no apparent hyper or hypothyroid symptoms  CV: no chest pain, pressure, palpitations, skipped beats  LUNGS: no SOB, LEAVITT, cough, wheezing   ABDOMEN: no diarrhea, constipation, abdominal pain  EXTREMITIES: no rashes, ulcers, edema  NEUROLOGY: decreased sensation at feet at nighttime; no headaches, denies changes in vision,   MSK: some arthralgias at knees, ankles, back; no apparent weakness  SKIN: no rashes or lesions  PSYCH:  stable mood, no significant anxiety or depression  ENDOCRINE: no heat or cold intolerance    Physical Exam   VS: /81  Pulse 87  Ht 1.829 m (6')  Wt 116.8 kg (257 lb 6.4 oz)  BMI 34.91 kg/m2  GENERAL: AXOX3, NAD, well dressed, answering questions appropriately, appears stated age.  THYROID:  normal gland, no apparent nodules or goiter  ABDOMEN: obese, soft, nontender, nondistended, no organomegaly  MSK: grossly intact  SKIN: scalp balding; no rashes, no lesions    LABS:    All pertinent notes, labs, and images personally reviewed by me.      A/P:  Encounter Diagnoses   Name Primary?     Type 2 diabetes mellitus with stable proliferative retinopathy, with long-term current use of insulin, unspecified laterality (H) Yes     Type 2 diabetes mellitus with diabetic nephropathy, with long-term current use of insulin (H)      Type 2 diabetes mellitus with diabetic neuropathy, with long-term current use of insulin (H)      Type 2 diabetes mellitus without complication, with long-term current use of insulin (H)      Comments:  Reviewed health history and diabetes issues.  Recent glucose trends excellent.    Plan:  Discussed general issues with the diabetes mellitus diagnosis and management  Reviewed the [id number] test which reflects previous overall glucose levels or control  Discussed the importance of blood glucose (BG) vs CGMS sensor glucose testing to assess glucose trends  Provided general overview of diabetes (oral and injectable) medication use  Reviewed recent LibrePersonal CGMS glucose trend data, in detail.    Recommend:  Discussed adjustments with the diabetes medications:   Lower dose as Levemir 54U sq QAM and 54U sq QPM   Humalog as H40- H20 (-35)- H20 (-25) schedule    Humalog sscale 5U per 50>150    Raise dose as glimeparide 4 mg 1 1/2-tabs QPM    Victoza 1.2 mg sq QD (recalls that his pharmacy/insurance fills 1.8 mg dose)  No lab tests ordered today  Needs close monitoring of BP, blood Cr and BUN, urine microalbumin ratio   Consider nephrology consultation  Reviewed hypoglycemia prevention topics:   Lower the Levemir and Humalog doses by 10% prior to aerobic exercise, such as soccer reffing   Have glucose tablets or regular Gatoraid available at soccer events if feeling low BG   Use the Armando monitor wanding technique during exercise/soccer games, if possible, to assess glucose trends  Keep focus on diet, exercise, weight management  Advise having fasting lipid panel testing and dilated eye examination, at least annually    Addressed  patient questions today    Labs ordered today:   Orders Placed This Encounter   Procedures     GLUCOSE MONITOR, 72 HOUR, PHYS INTERP     Radiology/Consults ordered today: None    More than 50% of the time spent with Mr. De Leon on counseling / coordinating his care.  Total appointment time was 25 minutes.      Follow-up:  3 mo.    Vic Bustos MD  Endocrinology  [place] [facility]  Copy:  MAI Spear

## 2018-11-19 NOTE — MR AVS SNAPSHOT
After Visit Summary   11/19/2018    Braden De Leon    MRN: [id number]           Patient Information     Date Of Birth          1970        Visit Information        Provider Department      11/19/2018 11:00 AM Vic Bustos MD [facility]        Today's Diagnoses     Type 2 diabetes mellitus with stable proliferative retinopathy, with long-term current use of insulin, unspecified laterality (H)    -  1    Type 2 diabetes mellitus with diabetic nephropathy, with long-term current use of insulin (H)        Type 2 diabetes mellitus with diabetic neuropathy, with long-term current use of insulin (H)        Type 2 diabetes mellitus without complication, with long-term current use of insulin (H)           Follow-ups after your visit        Follow-up notes from your care team     Return in about 3 months (around 2/19/2019).      Your next 10 appointments already scheduled     Nov 30, 2018 10:40 AM CST   [facility]    [address] [place] [address] [phone]            Feb 26, 2019  9:00 AM CST   Return Visit with Vic Bustos MD   [facility]    [address] [place] [address] [phone]              Who to contact     If you have questions or need follow up information about today's clinic visit or your schedule please contact [facility] directly at [phone].  Normal or non-critical lab and imaging results will be communicated to you by MyChart, letter or phone within 4 business days after the clinic has received the results. If you do not hear from us within 7 days, please contact the clinic through MyChart or phone. If you have a critical or abnormal lab result, we will notify you by phone as soon as possible.  Submit refill requests through [employer] or call your pharmacy and they will forward the  refill request to us. Please allow 3 business days for your refill to be completed.          Additional Information About Your Visit        [employer] Information     [employer] gives you secure access to your electronic health record. If you see a primary care provider, you can also send messages to your care team and make appointments. If you have questions, please call your primary care clinic.  If you do not have a primary care provider, please call [phone] and they will assist you.        Care EveryWhere ID     This is your Care EveryWhere ID. This could be used by other organizations to access your [place] medical records  [id number]        Your Vitals Were     Pulse Height BMI (Body Mass Index)             87 1.829 m (6') 34.91 kg/m2          Blood Pressure from Last 3 Encounters:   11/19/18 140/81   10/15/18 154/88   10/09/18 (!) 156/100    Weight from Last 3 Encounters:   11/19/18 116.8 kg (257 lb 6.4 oz)   10/15/18 117 kg (258 lb)   10/09/18 117 kg (258 lb)              Today, you had the following     No orders found for display         Today's Medication Changes          These changes are accurate as of 11/19/18  7:51 PM.  If you have any questions, ask your nurse or doctor.               These medicines have changed or have updated prescriptions.        Dose/Directions    glimepiride 4 MG tablet   Commonly known as:  AMARYL   This may have changed:  additional instructions   Used for:  Type 2 diabetes mellitus without complication, with long-term current use of insulin (H)   Changed by:  Vic Bustos MD        Take 1 1/2 tablets (6 mg) by mouth daily as directed   Quantity:  135 tablet   Refills:  3            Where to get your medicines      These medications were sent to [facility] [address] [place], [place] - [address] AT [facility] [address] [facility] [address]     Phone:  [phone]     glimepiride 4 MG tablet                Primary Care Provider  Office Phone # Fax #    Pepe Spear [phone] [address] [facility] [address]        Equal Access to Services     QUAN WHELAN : Hadii aad ku hadbrandendaisy Sozainabali, waedithda luqadaha, qaybta kaalmada willie, nannette solorio beccaseema valle laBharatiradha tuttle. So [facility] [phone].    ATENCIÓN: Si habla español, tiene a durham disposición servicios gratuitos de asistencia lingüística. Llame al [phone].    We comply with applicable federal civil rights laws and [place] laws. We do not discriminate on the basis of race, color, national origin, age, disability, sex, sexual orientation, or gender identity.            Thank you!     Thank you for choosing [facility]  for your care. Our goal is always to provide you with excellent care. Hearing back from our patients is one way we can continue to improve our services. Please take a few minutes to complete the written survey that you may receive in the mail after your visit with us. Thank you!             Your Updated Medication List - Protect others around you: Learn how to safely use, store and throw away your medicines at www.disposemymeds.org.          This list is accurate as of 11/19/18  7:51 PM.  Always use your most recent med list.                   Brand Name Dispense Instructions for use Diagnosis    aspirin 81 MG tablet     30 tablet    Take 1 tablet (81 mg) by mouth daily    Type 2 diabetes mellitus without complication (H)       blood glucose monitoring lancets     2 Box    USE WHEN TESTING BLOOD SUGARS TWICE DAILY.    Type 2 diabetes mellitus without complication (H)       continuous blood glucose monitoring sensor     9 each    For use with Freestyle Armando Flash  for continuous monitioring of blood glucose levels. Replace sensor every 10 days.    Type 2 diabetes mellitus with stable proliferative retinopathy, with long-term current use of insulin, unspecified laterality (H)       glimepiride 4 MG tablet    AMARYL     135 tablet    Take 1 1/2 tablets (6 mg) by mouth daily as directed    Type 2 diabetes mellitus without complication, with long-term current use of insulin (H)       Insulin Lispro 200 UNIT/ML soln    HUMALOG KWIKPEN    30 mL    Inject 40 units in the morning, 20 units at noon, and 20 units in evening according to sliding scale.    Type 2 diabetes mellitus with stable proliferative retinopathy, with long-term current use of insulin, unspecified laterality (H)       LEVEMIR FLEXTOUCH 100 UNIT/ML injection   Generic drug:  insulin detemir     45 mL    Inject 54- 60 units sq BID, as directed, E11.9    Type 2 diabetes mellitus without complication, with long-term current use of insulin (H)       lisinopril 5 MG tablet    PRINIVIL/ZESTRIL          losartan 100 MG tablet    COZAAR    90 tablet    TAKE 1 TABLET BY MOUTH DAILY    Hypertension goal BP (blood pressure) < 140/90, Type 2 diabetes mellitus with stable proliferative retinopathy, with long-term current use of insulin, unspecified laterality (H)       naproxen sodium 220 MG tablet    ALEVE    180 tablet    Take 2 tablets (440 mg) by mouth At Bedtime    Medication monitoring encounter       NOVOFINE PLUS 32G X 4 MM miscellaneous   Generic drug:  insulin pen needle     300 each    USE AS DIRECTED    Type 2 diabetes mellitus with stable proliferative retinopathy, with long-term current use of insulin, unspecified laterality (H)       omeprazole 40 MG capsule    priLOSEC    30 capsule    TAKE 1 CAPSULE(40 MG) BY MOUTH DAILY    Gastroesophageal reflux disease without esophagitis       oxyCODONE-acetaminophen 5-325 MG per tablet    PERCOCET    12 tablet    Take 1-2 tablets by mouth every 4 hours as needed for pain        pravastatin 40 MG tablet    PRAVACHOL    90 tablet    TAKE 1 TABLET(40 MG) BY MOUTH DAILY    Hyperlipidemia with target LDL less than 70, Type 2 diabetes mellitus with stable proliferative retinopathy, with long-term current use of insulin, unspecified  laterality (H), Proliferative diabetic retinopathy without macular edema associated with type 2 diabetes mellitus (H)       sertraline 100 MG tablet    ZOLOFT    180 tablet    Take 2 tablets (200 mg) by mouth daily    Mood changes       sildenafil 100 MG tablet    VIAGRA    6 tablet    TAKE 1/2 TO 1 TABLET(50  MG) BY MOUTH DAILY AS NEEDED FOR ERECTILE DYSFUNCTION    Erectile dysfunction, unspecified erectile dysfunction type       VICTOZA PEN 18 MG/3ML soln   Generic drug:  liraglutide     9 mL    Inject 1.8 mg Subcutaneous every morning    Type 2 diabetes mellitus with moderate nonproliferative retinopathy, with long-term current use of insulin, macular edema presence unspecified, unspecified laterality (H)

## 2018-11-30 ENCOUNTER — OFFICE VISIT (OUTPATIENT)
Dept: FAMILY MEDICINE | Facility: CLINIC | Age: 48
End: 2018-11-30
Payer: COMMERCIAL

## 2018-11-30 VITALS
DIASTOLIC BLOOD PRESSURE: 86 MMHG | HEART RATE: 93 BPM | SYSTOLIC BLOOD PRESSURE: 136 MMHG | OXYGEN SATURATION: 94 % | BODY MASS INDEX: 34.54 KG/M2 | WEIGHT: 255 LBS | TEMPERATURE: 99 F | HEIGHT: 72 IN

## 2018-11-30 DIAGNOSIS — Z79.4 TYPE 2 DIABETES MELLITUS WITH DIABETIC NEPHROPATHY, WITH LONG-TERM CURRENT USE OF INSULIN (H): ICD-10-CM

## 2018-11-30 DIAGNOSIS — Z12.11 SCREEN FOR COLON CANCER: ICD-10-CM

## 2018-11-30 DIAGNOSIS — Z13.89 SCREENING FOR DIABETIC PERIPHERAL NEUROPATHY: ICD-10-CM

## 2018-11-30 DIAGNOSIS — I10 HYPERTENSION GOAL BP (BLOOD PRESSURE) < 140/90: ICD-10-CM

## 2018-11-30 DIAGNOSIS — E11.3559 TYPE 2 DIABETES MELLITUS WITH STABLE PROLIFERATIVE RETINOPATHY, WITH LONG-TERM CURRENT USE OF INSULIN, UNSPECIFIED LATERALITY (H): ICD-10-CM

## 2018-11-30 DIAGNOSIS — E11.21 TYPE 2 DIABETES MELLITUS WITH DIABETIC NEPHROPATHY, WITH LONG-TERM CURRENT USE OF INSULIN (H): ICD-10-CM

## 2018-11-30 DIAGNOSIS — N52.9 ERECTILE DYSFUNCTION, UNSPECIFIED ERECTILE DYSFUNCTION TYPE: ICD-10-CM

## 2018-11-30 DIAGNOSIS — Z12.5 SCREENING FOR PROSTATE CANCER: ICD-10-CM

## 2018-11-30 DIAGNOSIS — Z51.81 MEDICATION MONITORING ENCOUNTER: ICD-10-CM

## 2018-11-30 DIAGNOSIS — E66.811 CLASS 1 OBESITY WITH SERIOUS COMORBIDITY AND BODY MASS INDEX (BMI) OF 34.0 TO 34.9 IN ADULT, UNSPECIFIED OBESITY TYPE: ICD-10-CM

## 2018-11-30 DIAGNOSIS — Z23 NEED FOR PROPHYLACTIC VACCINATION AGAINST STREPTOCOCCUS PNEUMONIAE (PNEUMOCOCCUS): ICD-10-CM

## 2018-11-30 DIAGNOSIS — E11.40 TYPE 2 DIABETES MELLITUS WITH DIABETIC NEUROPATHY, WITH LONG-TERM CURRENT USE OF INSULIN (H): ICD-10-CM

## 2018-11-30 DIAGNOSIS — Z00.00 ENCOUNTER FOR ROUTINE ADULT HEALTH EXAMINATION WITHOUT ABNORMAL FINDINGS: Primary | ICD-10-CM

## 2018-11-30 DIAGNOSIS — E78.5 HYPERLIPIDEMIA WITH TARGET LDL LESS THAN 70: ICD-10-CM

## 2018-11-30 DIAGNOSIS — Z79.4 TYPE 2 DIABETES MELLITUS WITH DIABETIC NEUROPATHY, WITH LONG-TERM CURRENT USE OF INSULIN (H): ICD-10-CM

## 2018-11-30 DIAGNOSIS — K21.9 GASTROESOPHAGEAL REFLUX DISEASE WITHOUT ESOPHAGITIS: ICD-10-CM

## 2018-11-30 DIAGNOSIS — D12.6 BENIGN NEOPLASM OF COLON, UNSPECIFIED PART OF COLON: ICD-10-CM

## 2018-11-30 DIAGNOSIS — G43.109 MIGRAINE WITH AURA AND WITHOUT STATUS MIGRAINOSUS, NOT INTRACTABLE: ICD-10-CM

## 2018-11-30 DIAGNOSIS — E11.311 DIABETIC MACULAR EDEMA (H): ICD-10-CM

## 2018-11-30 DIAGNOSIS — Z79.4 TYPE 2 DIABETES MELLITUS WITH STABLE PROLIFERATIVE RETINOPATHY, WITH LONG-TERM CURRENT USE OF INSULIN, UNSPECIFIED LATERALITY (H): ICD-10-CM

## 2018-11-30 PROCEDURE — 99396 PREV VISIT EST AGE 40-64: CPT | Performed by: FAMILY MEDICINE

## 2018-11-30 PROCEDURE — 99207 C FOOT EXAM  NO CHARGE: CPT | Mod: 25 | Performed by: FAMILY MEDICINE

## 2018-11-30 RX ORDER — LOSARTAN POTASSIUM 100 MG/1
100 TABLET ORAL DAILY
Qty: 90 TABLET | Refills: 3 | Status: SHIPPED | OUTPATIENT
Start: 2018-11-30 | End: 2019-12-16

## 2018-11-30 RX ORDER — ROSUVASTATIN CALCIUM 20 MG/1
20 TABLET, COATED ORAL DAILY
Qty: 90 TABLET | Refills: 3 | Status: SHIPPED | OUTPATIENT
Start: 2018-11-30 | End: 2019-12-16

## 2018-11-30 RX ORDER — SILDENAFIL 100 MG/1
50-100 TABLET, FILM COATED ORAL DAILY PRN
Qty: 18 TABLET | Refills: 3 | Status: SHIPPED | OUTPATIENT
Start: 2018-11-30 | End: 2021-05-07

## 2018-11-30 NOTE — MR AVS SNAPSHOT
After Visit Summary   11/30/2018    Braden De Leon    MRN: [id number]           Patient Information     Date Of Birth          1970        Visit Information        Provider Department      11/30/2018 10:40 AM Pepe Spear MD [facility]        Today's Diagnoses     Encounter for routine adult health examination without abnormal findings    -  1    Type 2 diabetes mellitus with stable proliferative retinopathy, with long-term current use of insulin, unspecified laterality (H)        Type 2 diabetes mellitus with diabetic nephropathy, with long-term current use of insulin (H)        Type 2 diabetes mellitus with diabetic neuropathy, with long-term current use of insulin (H)        Hypertension goal BP (blood pressure) < 140/90        Hyperlipidemia with target LDL less than 70        Diabetic macular edema (H)        Gastroesophageal reflux disease without esophagitis        Migraine with aura and without status migrainosus, not intractable        Benign neoplasm of colon, unspecified part of colon        Screen for colon cancer        Screening for prostate cancer        Screening for diabetic peripheral neuropathy        Class 1 obesity with serious comorbidity and body mass index (BMI) of 34.0 to 34.9 in adult, unspecified obesity type        Medication monitoring encounter        Need for prophylactic vaccination against Streptococcus pneumoniae (pneumococcus)        Erectile dysfunction, unspecified erectile dysfunction type          Care Instructions    Diabetes Goals:   1) Blood glucose  fasting.  2) Blood glucose less than 180 2 hours after eating.    [facility]                        To reach your care team during and after hours:   [phone]  To reach our pharmacy:        [phone]    Clinic Hours                        Our clinic hours are:    Monday   7:30 am to 7:00 pm                  Tuesday through Friday 7:30 am to 5:00 pm                              Saturday   8:00 am to 12:00 pm      Sunday   Closed      Pharmacy Hours                        Our pharmacy hours are:    Monday   8:30 am to 7:00 pm       Tuesday to Friday  8:30 am to 6:00 pm                       Saturday    9:00 am to 1:00 pm              Sunday    Closed              There is also information available at our web site:  [email or web address]    If your provider ordered any lab tests and you do not receive the results within 10 business days, please call the clinic.    If you need a medication refill please contact your pharmacy.  Please allow 2-3 business days for your refill to be completed.    Our clinic offers telephone visits and e visits.  Please ask one of your team members to explain more.      Use [email or web address] (secure email communication and access to your chart) to send your primary care provider a message or make an appointment. Ask someone on your Team how to sign up for [employer].  Immunizations                      Immunization History   Administered Date(s) Administered     TD (ADULT, 7+) 04/01/2001        Health Maintenance                         Health Maintenance Due   Topic Date Due     Pneumovax Vaccine  03/18/1970     Diabetic Foot Exam - yearly  02/16/2017     Wellness Visit with your Primary Provider - yearly  01/10/2018     Colon Cancer Screening - FIT Test - yearly  05/23/2018     Eye Exam - yearly  08/01/2018       Preventive Health Recommendations  Male Ages 50 - 64    Yearly exam:             See your health care provider every year in order to  o   Review health changes.   o   Discuss preventive care.    o   Review your medicines if your doctor has prescribed any.     Have a cholesterol test every 5 years, or more frequently if you are at risk for high cholesterol/heart disease.     Have a diabetes test (fasting glucose) every three years. If you are at risk for diabetes, you should have this test more often.     Have a colonoscopy at age 50, or have a yearly FIT  test (stool test). These exams will check for colon cancer.      Talk with your health care provider about whether or not a prostate cancer screening test (PSA) is right for you.    You should be tested each year for STDs (sexually transmitted diseases), if you re at risk.     Shots: Get a flu shot each year. Get a tetanus shot every 10 years.     Nutrition:    Eat at least 5 servings of fruits and vegetables daily.     Eat whole-grain bread, whole-wheat pasta and brown rice instead of white grains and rice.     Get adequate Calcium and Vitamin D.     Lifestyle    Exercise for at least 150 minutes a week (30 minutes a day, 5 days a week). This will help you control your weight and prevent disease.     Limit alcohol to one drink per day.     No smoking.     Wear sunscreen to prevent skin cancer.     See your dentist every six months for an exam and cleaning.     See your eye doctor every 1 to 2 years.            Follow-ups after your visit        Your next 10 appointments already scheduled     Feb 26, 2019  9:00 AM CST   Return Visit with Vic Bustos MD   [facility] ([address] [address] [phone]              Who to contact     If you have questions or need follow up information about today's clinic visit or your schedule please contact [facility] directly at [phone].  Normal or non-critical lab and imaging results will be communicated to you by MyChart, letter or phone within 4 business days after the clinic has received the results. If you do not hear from us within 7 days, please contact the clinic through MyChart or phone. If you have a critical or abnormal lab result, we will notify you by phone as soon as possible.  Submit refill requests through [employer] or call your pharmacy and they will forward the refill request to us. Please allow 3 business days for your refill to be completed.          Additional  Information About Your Visit        [employer] Information     [employer] gives you secure access to your electronic health record. If you see a primary care provider, you can also send messages to your care team and make appointments. If you have questions, please call your primary care clinic.  If you do not have a primary care provider, please call [phone] and they will assist you.        Care EveryWhere ID     This is your Care EveryWhere ID. This could be used by other organizations to access your [place] medical records  [id number]        Your Vitals Were     Pulse Temperature Height Pulse Oximetry BMI (Body Mass Index)       93 99  F (37.2  C) (Oral) 6' (1.829 m) 94% 34.58 kg/m2        Blood Pressure from Last 3 Encounters:   11/30/18 136/86   11/19/18 140/81   10/15/18 154/88    Weight from Last 3 Encounters:   11/30/18 255 lb (115.7 kg)   11/19/18 257 lb 6.4 oz (116.8 kg)   10/15/18 258 lb (117 kg)              We Performed the Following     FOOT EXAM  NO CHARGE [30075.114]          Today's Medication Changes          These changes are accurate as of 11/30/18 11:44 AM.  If you have any questions, ask your nurse or doctor.               Start taking these medicines.        Dose/Directions    rosuvastatin 20 MG tablet   Commonly known as:  CRESTOR   Used for:  Type 2 diabetes mellitus with stable proliferative retinopathy, with long-term current use of insulin, unspecified laterality (H), Type 2 diabetes mellitus with diabetic nephropathy, with long-term current use of insulin (H), Type 2 diabetes mellitus with diabetic neuropathy, with long-term current use of insulin (H), Hyperlipidemia with target LDL less than 70   Started by:  Pepe Spear MD        Dose:  20 mg   Take 1 tablet (20 mg) by mouth daily   Quantity:  90 tablet   Refills:  3         These medicines have changed or have updated prescriptions.        Dose/Directions    losartan 100 MG tablet   Commonly known as:  COZAAR   This may have  changed:  See the new instructions.   Used for:  Hypertension goal BP (blood pressure) < 140/90, Type 2 diabetes mellitus with stable proliferative retinopathy, with long-term current use of insulin, unspecified laterality (H), Type 2 diabetes mellitus with diabetic nephropathy, with long-term current use of insulin (H), Type 2 diabetes mellitus with diabetic neuropathy, with long-term current use of insulin (H)   Changed by:  Pepe Spear MD        Dose:  100 mg   Take 1 tablet (100 mg) by mouth daily   Quantity:  90 tablet   Refills:  3       sildenafil 100 MG tablet   Commonly known as:  VIAGRA   This may have changed:  See the new instructions.   Used for:  Erectile dysfunction, unspecified erectile dysfunction type   Changed by:  Pepe Spear MD        Dose:   mg   Take 0.5-1 tablets ( mg) by mouth daily as needed   Quantity:  18 tablet   Refills:  3         Stop taking these medicines if you haven't already. Please contact your care team if you have questions.     lisinopril 5 MG tablet   Commonly known as:  PRINIVIL/ZESTRIL   Stopped by:  Pepe Spear MD           pravastatin 40 MG tablet   Commonly known as:  PRAVACHOL   Stopped by:  Pepe Spear MD                Where to get your medicines      These medications were sent to [facility] [address] [place] [address] [facility] [address]  [address] [facility] [address]     Phone:  [phone]     losartan 100 MG tablet    rosuvastatin 20 MG tablet    sildenafil 100 MG tablet                Primary Care Provider Office Phone # Fax #    Pepe Spear [phone]       [address] [place] [address]        Equal Access to Services     [facility] : Hadketan Cornejo, waaxda luqlisset, qaybta kaalnannette steward. So [facility] [phone].    ATENCIÓN: Si habla español, tiene a udrham disposición servicios gratuitos de asistencia  lingüística. Bernadine al [phone].    We comply with applicable federal civil rights laws and [place] laws. We do not discriminate on the basis of race, color, national origin, age, disability, sex, sexual orientation, or gender identity.            Thank you!     Thank you for choosing [facility]  for your care. Our goal is always to provide you with excellent care. Hearing back from our patients is one way we can continue to improve our services. Please take a few minutes to complete the written survey that you may receive in the mail after your visit with us. Thank you!             Your Updated Medication List - Protect others around you: Learn how to safely use, store and throw away your medicines at www.disposemymeds.org.          This list is accurate as of 11/30/18 11:44 AM.  Always use your most recent med list.                   Brand Name Dispense Instructions for use Diagnosis    aspirin 81 MG tablet    ASA    30 tablet    Take 1 tablet (81 mg) by mouth daily    Type 2 diabetes mellitus without complication (H)       blood glucose monitoring lancets     2 Box    USE WHEN TESTING BLOOD SUGARS TWICE DAILY.    Type 2 diabetes mellitus without complication (H)       continuous blood glucose monitoring sensor     9 each    For use with Freestyle Armando Flash  for continuous monitioring of blood glucose levels. Replace sensor every 10 days.    Type 2 diabetes mellitus with stable proliferative retinopathy, with long-term current use of insulin, unspecified laterality (H)       glimepiride 4 MG tablet    AMARYL    135 tablet    Take 1 1/2 tablets (6 mg) by mouth daily as directed    Type 2 diabetes mellitus without complication, with long-term current use of insulin (H)       Insulin Lispro 200 UNIT/ML soln    HUMALOG KWIKPEN    30 mL    Inject 40 units in the morning, 20 units at noon, and 20 units in evening according to sliding scale.    Type 2 diabetes mellitus with stable  proliferative retinopathy, with long-term current use of insulin, unspecified laterality (H)       LEVEMIR FLEXTOUCH 100 UNIT/ML pen   Generic drug:  insulin detemir     45 mL    Inject 54- 60 units sq BID, as directed, E11.9    Type 2 diabetes mellitus without complication, with long-term current use of insulin (H)       losartan 100 MG tablet    COZAAR    90 tablet    Take 1 tablet (100 mg) by mouth daily    Hypertension goal BP (blood pressure) < 140/90, Type 2 diabetes mellitus with stable proliferative retinopathy, with long-term current use of insulin, unspecified laterality (H), Type 2 diabetes mellitus with diabetic nephropathy, with long-term current use of insulin (H), Type 2 diabetes mellitus with diabetic neuropathy, with long-term current use of insulin (H)       naproxen sodium 220 MG tablet    ALEVE    180 tablet    Take 2 tablets (440 mg) by mouth At Bedtime    Medication monitoring encounter       NOVOFINE PLUS 32G X 4 MM miscellaneous   Generic drug:  insulin pen needle     300 each    USE AS DIRECTED    Type 2 diabetes mellitus with stable proliferative retinopathy, with long-term current use of insulin, unspecified laterality (H)       omeprazole 40 MG DR capsule    priLOSEC    30 capsule    TAKE 1 CAPSULE(40 MG) BY MOUTH DAILY    Gastroesophageal reflux disease without esophagitis       oxyCODONE-acetaminophen 5-325 MG tablet    PERCOCET    12 tablet    Take 1-2 tablets by mouth every 4 hours as needed for pain        rosuvastatin 20 MG tablet    CRESTOR    90 tablet    Take 1 tablet (20 mg) by mouth daily    Type 2 diabetes mellitus with stable proliferative retinopathy, with long-term current use of insulin, unspecified laterality (H), Type 2 diabetes mellitus with diabetic nephropathy, with long-term current use of insulin (H), Type 2 diabetes mellitus with diabetic neuropathy, with long-term current use of insulin (H), Hyperlipidemia with target LDL less than 70       sertraline 100 MG  tablet    ZOLOFT    180 tablet    Take 2 tablets (200 mg) by mouth daily    Mood changes       sildenafil 100 MG tablet    VIAGRA    18 tablet    Take 0.5-1 tablets ( mg) by mouth daily as needed    Erectile dysfunction, unspecified erectile dysfunction type       VICTOZA PEN 18 MG/3ML solution   Generic drug:  liraglutide     9 mL    Inject 1.8 mg Subcutaneous every morning    Type 2 diabetes mellitus with moderate nonproliferative retinopathy, with long-term current use of insulin, macular edema presence unspecified, unspecified laterality (H)

## 2018-11-30 NOTE — PATIENT INSTRUCTIONS
Diabetes Goals:   1) Blood glucose  fasting.  2) Blood glucose less than 180 2 hours after eating.    Recommend tetanus booster. Recommend flu shot. Recommend Shingrix vaccine for shingles. Check with insurance to see where the Shingrix vaccine is the cheapest. Follow up 2-6 months after receiving the first shot for the second shot.    [facility]                        To reach your care team during and after hours:   [phone]  To reach our pharmacy:        [phone]    Clinic Hours                        Our clinic hours are:    Monday   7:30 am to 7:00 pm                  Tuesday through Friday 7:30 am to 5:00 pm                             Saturday   8:00 am to 12:00 pm      Sunday   Closed      Pharmacy Hours                        Our pharmacy hours are:    Monday   8:30 am to 7:00 pm       Tuesday to Friday  8:30 am to 6:00 pm                       Saturday    9:00 am to 1:00 pm              Sunday    Closed              There is also information available at our web site:  [email or web address]    If your provider ordered any lab tests and you do not receive the results within 10 business days, please call the clinic.    If you need a medication refill please contact your pharmacy.  Please allow 2-3 business days for your refill to be completed.    Our clinic offers telephone visits and e visits.  Please ask one of your team members to explain more.      Use [employer] (secure email communication and access to your chart) to send your primary care provider a message or make an appointment. Ask someone on your Team how to sign up for [employer].  Immunizations                      Immunization History   Administered Date(s) Administered     TD (ADULT, 7+) 04/01/2001        Health Maintenance                         Health Maintenance Due   Topic Date Due     Pneumovax Vaccine  03/18/1970     Diabetic Foot Exam - yearly  02/16/2017     Wellness Visit with your Primary Provider - yearly   01/10/2018     Colon Cancer Screening - FIT Test - yearly  05/23/2018     Eye Exam - yearly  08/01/2018       Preventive Health Recommendations  Male Ages 50 - 64    Yearly exam:             See your health care provider every year in order to  o   Review health changes.   o   Discuss preventive care.    o   Review your medicines if your doctor has prescribed any.     Have a cholesterol test every 5 years, or more frequently if you are at risk for high cholesterol/heart disease.     Have a diabetes test (fasting glucose) every three years. If you are at risk for diabetes, you should have this test more often.     Have a colonoscopy at age 50, or have a yearly FIT test (stool test). These exams will check for colon cancer.      Talk with your health care provider about whether or not a prostate cancer screening test (PSA) is right for you.    You should be tested each year for STDs (sexually transmitted diseases), if you re at risk.     Shots: Get a flu shot each year. Get a tetanus shot every 10 years.     Nutrition:    Eat at least 5 servings of fruits and vegetables daily.     Eat whole-grain bread, whole-wheat pasta and brown rice instead of white grains and rice.     Get adequate Calcium and Vitamin D.     Lifestyle    Exercise for at least 150 minutes a week (30 minutes a day, 5 days a week). This will help you control your weight and prevent disease.     Limit alcohol to one drink per day.     No smoking.     Wear sunscreen to prevent skin cancer.     See your dentist every six months for an exam and cleaning.     See your eye doctor every 1 to 2 years.

## 2018-11-30 NOTE — PROGRESS NOTES
SUBJECTIVE:   CC: Braden De Leon is an 48 year old male who presents for preventive health visit.     Healthy Habits:    Do you get at least three servings of calcium containing foods daily (dairy, green leafy vegetables, etc.)? yes    Amount of exercise or daily activities, outside of work: refing 2-3 times a week    Problems taking medications regularly No    Medication side effects: No    Have you had an eye exam in the past two years? yes    Do you see a dentist twice per year? no    Do you have sleep apnea, excessive snoring or daytime drowsiness?yes    Diabetes: Patient reports a blood glucose range of  mg/dL with a morning average of 114 mg/dL. Patient's Diabetes is well controlled. Patient is currently prescribed 6 mg Glimepiride daily and insulin injections for DM management. Patient consults with Dr. Bustos for DM management.    Glucose   Date Value Ref Range Status   10/09/2018 108 (H) 70 - 99 mg/dL Final   10/06/2018 291 (H) 70 - 99 mg/dL Final   05/14/2018 131 (H) 70 - 99 mg/dL Final   01/30/2018 132 (H) 70 - 99 mg/dL Final     Comment:     Fasting specimen   07/18/2017 122 (H) 65 - 99 mg/dL Final     Lab Results   Component Value Date    A1C 6.5 10/09/2018    A1C 6.7 05/14/2018    A1C 6.6 01/30/2018    A1C 6.7 07/18/2017    A1C 7.4 04/11/2017     Hypertension: Patient presents today as normotensive. Patient is currently prescribed 100 mg Losartan daily for hypertension management.    BP Readings from Last 5 Encounters:   11/30/18 136/86   11/19/18 140/81   10/15/18 154/88   10/09/18 (!) 156/100   10/06/18 (!) 180/95     Creatinine   Date Value Ref Range Status   10/09/2018 1.08 0.66 - 1.25 mg/dL Final     Hyperlipidemia: Patient's hyperlipidemia is moderately controlled. Patient is currently prescribed 40 mg Pravastatin daily for hyperlipidemia management.    Recent Labs   Lab Test  10/09/18   1031  01/30/18   1110   05/12/15   1040  09/09/14   1017   CHOL  148  134   < >  167  174   HDL  40   42   < >  44  41   LDL  77  61   < >  97  86   TRIG  156*  153*   < >  129  235*   CHOLHDLRATIO   --    --    --   3.8  4.2    < > = values in this interval not displayed.     Migraines: Patient reports 3 days of headaches. Patient reports he recently got new glasses and needs the prescription adjusted. Patient reports these headaches do not feel like migraines.    GERD: Stable. Patient's GERD is well controlled by 40 mg Omeprazole as needed.    Depression/Anxiety: Patient's depression/anxiety is well controlled. Patient is currently prescribed 200 mg Zoloft daily for depression/anxiety management.    Today's PHQ-2 Score:   PHQ-2 ( 1999 Pfizer) 6/13/2017 1/10/2017   Q1: Little interest or pleasure in doing things 0 0   Q2: Feeling down, depressed or hopeless 0 0   PHQ-2 Score 0 0   Q1: Little interest or pleasure in doing things - -   Q2: Feeling down, depressed or hopeless - -   PHQ-2 Score - -     Abuse: Current or Past(Physical, Sexual or Emotional)- No  Do you feel safe in your environment? Yes    Social History   Substance Use Topics     Smoking status: Never Smoker     Smokeless tobacco: Never Used     Alcohol use No     If you drink alcohol do you typically have >3 drinks per day or >7 drinks per week? No                      Last PSA:   PSA   Date Value Ref Range Status   10/09/2018 0.81 0 - 4 ug/L Final     Comment:     Assay Method:  Chemiluminescence using Siemens Vista analyzer       Reviewed orders with patient. Reviewed health maintenance and updated orders accordingly - Yes  Labs reviewed in EPIC    Reviewed and updated as needed this visit by clinical staff  Tobacco  Allergies  Meds  Med Hx  Surg Hx  Fam Hx  Soc Hx      Reviewed and updated as needed this visit by Provider        Health Maintenance     Colonoscopy:  Last 2/21/2017, Due 2/2022   FIT:  Not on file              PSA:  Last 10/9/2018, Due 10/2019   DEXA:  Not on file    Health Maintenance Due   Topic Date Due     PNEUMOVAX 1X HI  RISK PATIENT < 65 (NO IB MSG)  03/18/1970     FIT Q1 YR  05/23/2018     EYE EXAM Q1 YEAR  08/01/2018       Current Problem List    Patient Active Problem List   Diagnosis     Esophageal reflux     Hypertension goal BP (blood pressure) < 140/90     Type 2 diabetes, HbA1c goal < 7% (H)     Advanced directives, counseling/discussion     Migraine     Benign neoplasm of colon     Esophagitis     Esophageal stricture     Tarsal tunnel syndrome     Other enthesopathy of ankle and tarsus     ED (erectile dysfunction)     Hyperlipidemia with target LDL less than 70     Diabetic macular edema (H)     Type 2 diabetes mellitus with stable proliferative retinopathy, with long-term current use of insulin, unspecified laterality (H)     Cholelithiasis     Nephrolithiasis     Class 1 obesity with serious comorbidity and body mass index (BMI) of 34.0 to 34.9 in adult, unspecified obesity type     Type 2 diabetes mellitus with diabetic nephropathy, with long-term current use of insulin (H)     Type 2 diabetes mellitus with diabetic neuropathy, with long-term current use of insulin (H)       Past Medical History    Past Medical History:   Diagnosis Date     Benign neoplasm of colon 11/07, 2/12    adenomatous polyps - due 5 yrs     Cholelithiasis      Diabetic macular edema (H)     bilateral - avastatin >10 each - Dr CANDELARIA Zuñiga     ED (erectile dysfunction)      Esophageal reflux 1995     Esophageal stricture 8/13     Esophagitis 8/13    LA Grade A     Hyperlipidemia LDL goal < 70      Hypertension goal BP (blood pressure) < 140/90 3/09     Migraine 1986    rare migraines 2/yr     Nephrolithiasis 10/2018    4 mm - urology     Obesity (BMI 30.0-34.9)      Other premature beats 11/07    bigeminy     Proliferative diabetic retinopathy (H) 06/2017    [facility] - Avastatin - Dr Rosalva Zuñiga     Type 2 diabetes, HbA1c goal < 7% (H) 1992    Dr Bustos     Unspecified gastritis and gastroduodenitis without mention of hemorrhage 1995       Past  Surgical History    Past Surgical History:   Procedure Laterality Date     COLONOSCOPY  11/07, 2/12    polyps x 2 - internal hemorrhoids - adenomatous polyps - due every 5 yrs     COLONOSCOPY N/A 2/21/2017    diverticula x 1 - due 5 yrs     ESOPHAGOSCOPY, GASTROSCOPY, DUODENOSCOPY (EGD), COMBINED  8/20/2013    Esophagitis LA Grade A, esophageal stricture     HC REPAIR ING HERNIA,5+Y/O,REDUCIBL  1978    rt     STRESS ECHO (METRO)  3/12    normal       Current Medications    Current Outpatient Prescriptions   Medication Sig Dispense Refill     aspirin 81 MG tablet Take 1 tablet (81 mg) by mouth daily 30 tablet      blood glucose monitoring (SOFTCLIX) lancets USE WHEN TESTING BLOOD SUGARS TWICE DAILY. 2 Box 3     continuous blood glucose monitoring (StandardNineSTYLE CHRISTIANE) sensor For use with Freestyle Christiane Flash  for continuous monitioring of blood glucose levels. Replace sensor every 10 days. 9 each 3     glimepiride (AMARYL) 4 MG tablet Take 1 1/2 tablets (6 mg) by mouth daily as directed 135 tablet 3     Insulin Lispro (HUMALOG KWIKPEN) 200 UNIT/ML soln Inject 40 units in the morning, 20 units at noon, and 20 units in evening according to sliding scale. 30 mL 11     LEVEMIR FLEXTOUCH 100 UNIT/ML injection Inject 54- 60 units sq BID, as directed, E11.9 45 mL 11     losartan (COZAAR) 100 MG tablet Take 1 tablet (100 mg) by mouth daily 90 tablet 3     naproxen sodium (ALEVE) 220 MG tablet Take 2 tablets (440 mg) by mouth At Bedtime 180 tablet 3     NOVOFINE PLUS 32G X 4 MM USE AS DIRECTED 300 each 3     omeprazole (PRILOSEC) 40 MG capsule TAKE 1 CAPSULE(40 MG) BY MOUTH DAILY 30 capsule 1     oxyCODONE-acetaminophen (PERCOCET) 5-325 MG per tablet Take 1-2 tablets by mouth every 4 hours as needed for pain 12 tablet 0     rosuvastatin (CRESTOR) 20 MG tablet Take 1 tablet (20 mg) by mouth daily 90 tablet 3     sertraline (ZOLOFT) 100 MG tablet Take 2 tablets (200 mg) by mouth daily 180 tablet 3     sildenafil (VIAGRA)  100 MG tablet Take 0.5-1 tablets ( mg) by mouth daily as needed 18 tablet 3     VICTOZA PEN 18 MG/3ML soln Inject 1.8 mg Subcutaneous every morning 9 mL 11       Allergies    Allergies   Allergen Reactions     Metformin Hcl [Riomet]      Nausea and throat tightening     Penicillins      anaphylaxis     Thiethylperazine Maleate      Torecan Throat Swelling       Immunizations    Immunization History   Administered Date(s) Administered     TD (ADULT, 7+) 2001       Family History    Family History   Problem Relation Age of Onset     Hypertension Mother 58     Connective Tissue Disorder Father 70     Diabetes Father 55     Breast Cancer Maternal Grandmother      Chronic Obstructive Pulmonary Disease Paternal Grandfather      Other - See Comments Maternal Grandfather       in WWII     Other - See Comments Paternal Grandmother      Train     Other - See Comments Brother      MVA     Kidney Cancer Brother      Colon Cancer No family hx of        Social History    Social History     Social History     Marital status:      Spouse name: Lela     Number of children: 1     Years of education: 13     Occupational History     manage [employer]     Social History Main Topics     Smoking status: Never Smoker     Smokeless tobacco: Never Used     Alcohol use No     Drug use: No     Sexual activity: Yes     Partners: Female     Other Topics Concern      Service Yes     GroupTie     Caffeine Concern Yes     24 oz diet     Exercise Yes     3-5/wk, Ref's     Seat Belt Yes     Parent/Sibling W/ Cabg, Mi Or Angioplasty Before 65f 55m? No     Social History Narrative       ROS:  Constitutional, HEENT, cardiovascular, pulmonary, GI, , musculoskeletal, neuro, skin, endocrine and psych systems are negative, except as otherwise noted.    OBJECTIVE:   /86  Pulse 93  Temp 99  F (37.2  C) (Oral)  Ht 6' (1.829 m)  Wt 255 lb (115.7 kg)  SpO2 94%  BMI 34.58 kg/m2  EXAM:  GENERAL:  healthy, alert and no distress  EYES: Eyes grossly normal to inspection  HENT:ear canals and TM's normal upon viewing with otoscope, nose and mouth without ulcers or lesions upon viewing with otoscope  NECK: no adenopathy, no asymmetry, masses, or scars and thyroid normal to palpation  RESP: lungs clear to auscultation - no rales, rhonchi or wheezes  CV: regular rate and rhythm, normal S1 S2, no S3 or S4, no murmur, click or rub, no peripheral edema and peripheral pulses strong  ABDOMEN: soft, nontender, no hepatosplenomegaly, no masses and bowel sounds normal   (male)/RECTAL: Declined  MS: no gross musculoskeletal defects noted, no edema  SKIN: no suspicious lesions or rashes, 3 mm dark mole left side of back  NEURO: Normal strength and tone, mentation intact and speech normal  PSYCH: mentation appears normal, affect normal/bright  BACK: no CVA tenderness, no paralumbar tenderness    Diagnostic Test Results:  No results found for this or any previous visit (from the past 24 hour(s)).    ASSESSMENT/PLAN:       ICD-10-CM    1. Encounter for routine adult health examination without abnormal findings Z00.00 FOOT EXAM  NO CHARGE [68358.114]   2. Type 2 diabetes mellitus with stable proliferative retinopathy, with long-term current use of insulin, unspecified laterality (H) E11.3559 losartan (COZAAR) 100 MG tablet    Z79.4 rosuvastatin (CRESTOR) 20 MG tablet   3. Type 2 diabetes mellitus with diabetic nephropathy, with long-term current use of insulin (H) E11.21 losartan (COZAAR) 100 MG tablet    Z79.4 rosuvastatin (CRESTOR) 20 MG tablet   4. Type 2 diabetes mellitus with diabetic neuropathy, with long-term current use of insulin (H) E11.40 losartan (COZAAR) 100 MG tablet    Z79.4 rosuvastatin (CRESTOR) 20 MG tablet   5. Hypertension goal BP (blood pressure) < 140/90 I10 losartan (COZAAR) 100 MG tablet   6. Hyperlipidemia with target LDL less than 70 E78.5 rosuvastatin (CRESTOR) 20 MG tablet   7. Diabetic macular edema  (H) E11.311    8. Gastroesophageal reflux disease without esophagitis K21.9    9. Migraine with aura and without status migrainosus, not intractable G43.109    10. Benign neoplasm of colon, unspecified part of colon D12.6    11. Screen for colon cancer Z12.11    12. Screening for prostate cancer Z12.5    13. Screening for diabetic peripheral neuropathy Z13.89 FOOT EXAM  NO CHARGE [73580.114]   14. Class 1 obesity with serious comorbidity and body mass index (BMI) of 34.0 to 34.9 in adult, unspecified obesity type E66.9     Z68.34    15. Medication monitoring encounter Z51.81    16. Need for prophylactic vaccination against Streptococcus pneumoniae (pneumococcus) Z23    17. Erectile dysfunction, unspecified erectile dysfunction type N52.9 sildenafil (VIAGRA) 100 MG tablet     Discussed treatment/modality options, including risk and benefits, he desires advised aspirin 81 mg po daily, advised dentist every 6 months, advised diet and exercise and advised opthalmologist every 1-2 years. All diagnosis above reviewed and noted above, otherwise stable.  See [facility] orders for further details.      1) Patient reports a blood glucose range of  mg/dL with a morning average of 114 mg/dL. Patient's Diabetes is well controlled. Patient is currently prescribed 6 mg Glimepiride daily and insulin injections for DM management. Advised continued use. Patient consults with Dr. Bustos for DM management. Recommend continued follow up. Has freestyle carlee.    2) Patient presents today as normotensive. Patient is currently prescribed 100 mg Losartan daily for hypertension management. Advised continued use. Prescription refilled today.    3) Patient's hyperlipidemia is moderately controlled. Patient is currently prescribed 40 mg Pravastatin daily for hyperlipidemia management. Patient advised to discontinue pravastatin and prescribed 20 mg Rosuvastatin daily today for further hyperlipidemia management.    4) Patient's GERD is well  controlled by 40 mg Omeprazole as needed. Advised continued use.    5) Patient's depression/anxiety is well controlled. Patient is currently prescribed 200 mg Zoloft daily for depression/anxiety management. Advised continued use.    6) Recommend flu shot, patient declined, declines all immunizations.    7) Follow up for mole removal procedure.    8) Follow up in 3 months for medication recheck visit.    Health Maintenance Due   Topic Date Due     PNEUMOVAX 1X HI RISK PATIENT < 65 (NO IB MSG)  03/18/1970     FIT Q1 YR  05/23/2018     EYE EXAM Q1 YEAR  08/01/2018       COUNSELING:  Reviewed preventive health counseling, as reflected in patient instructions    BP Readings from Last 1 Encounters:   11/30/18 136/86     Estimated body mass index is 34.58 kg/(m^2) as calculated from the following:    Height as of this encounter: 6' (1.829 m).    Weight as of this encounter: 255 lb (115.7 kg).      Weight management plan: Discussed healthy diet and exercise guidelines     reports that he has never smoked. He has never used smokeless tobacco.    Counseling Resources:  ATP IV Guidelines  Pooled Cohorts Equation Calculator  FRAX Risk Assessment  ICSI Preventive Guidelines  Dietary Guidelines for Americans, 2010  USDA's MyPlate  ASA Prophylaxis  Lung CA Screening    This document serves as a record of the services and decisions personally performed and made by Pepe Spear MD. It was created on his behalf by Eber Ernst, a trained medical scribe. The creation of this document is based on the provider's statements to the medical scribe.  Eber Ernst November 30, 2018 11:19 AM     The information in this document, created by the medical scribe for me, accurately reflects the services I personally performed and the decisions made by me. I have reviewed and approved this document for accuracy prior to leaving the patient care area.  November 30, 2018         Pepe Spear MD, [facility]    [address] [place]  [address]    [phone] Fax

## 2018-12-18 ENCOUNTER — TRANSFERRED RECORDS (OUTPATIENT)
Dept: HEALTH INFORMATION MANAGEMENT | Facility: CLINIC | Age: 48
End: 2018-12-18

## 2018-12-24 DIAGNOSIS — K21.9 GASTROESOPHAGEAL REFLUX DISEASE WITHOUT ESOPHAGITIS: ICD-10-CM

## 2018-12-24 NOTE — TELEPHONE ENCOUNTER
"Requested Prescriptions   Pending Prescriptions Disp Refills     omeprazole (PRILOSEC) 40 MG DR capsule [Pharmacy Med Name: OMEPRAZOLE 40MG CAPSULES] 30 capsule 0    Last Written Prescription Date:  6.7.18  Last Fill Quantity: 30,  # refills: 1   Last Office Visit: 11/30/2018   Future Office Visit:    Next 5 appointments (look out 90 days)    Trevin 15, 2019 11:00 AM CST  SHORT with Pepe Spear MD, RV PROC RM 1  [facility] [address] [place] [id number]  [phone]   Feb 26, 2019  9:00 AM CST  Return Visit with Vic [facility]ustos MD  [facility] [address] [place] [id number] [phone]          Sig: TAKE ONE CAPSULE BY MOUTH EVERY DAY.    PPI Protocol Passed - 12/24/2018  3:27 AM       Passed - Not on Clopidogrel (unless Pantoprazole ordered)       Passed - No diagnosis of osteoporosis on record       Passed - Recent (12 mo) or future (30 days) visit within the authorizing provider's specialty    Patient had office visit in the last 12 months or has a visit in the next 30 days with authorizing provider or within the authorizing provider's specialty.  See \"Patient Info\" tab in inbasket, or \"Choose Columns\" in Meds & Orders section of the refill encounter.             Passed - Patient is age 18 or older          "

## 2018-12-26 RX ORDER — OMEPRAZOLE 40 MG/1
CAPSULE, DELAYED RELEASE ORAL
Qty: 30 CAPSULE | Refills: 0 | Status: SHIPPED | OUTPATIENT
Start: 2018-12-26 | End: 2019-01-25

## 2018-12-26 NOTE — TELEPHONE ENCOUNTER
Prescription approved per [facility] Refill Protocol.  Sarah Early RN  [facility]

## 2019-01-02 ENCOUNTER — TELEPHONE (OUTPATIENT)
Dept: ENDOCRINOLOGY | Facility: CLINIC | Age: 49
End: 2019-01-02

## 2019-01-02 DIAGNOSIS — E11.3559 TYPE 2 DIABETES MELLITUS WITH STABLE PROLIFERATIVE RETINOPATHY, WITH LONG-TERM CURRENT USE OF INSULIN, UNSPECIFIED LATERALITY (H): ICD-10-CM

## 2019-01-02 DIAGNOSIS — Z79.4 TYPE 2 DIABETES MELLITUS WITH STABLE PROLIFERATIVE RETINOPATHY, WITH LONG-TERM CURRENT USE OF INSULIN, UNSPECIFIED LATERALITY (H): ICD-10-CM

## 2019-01-02 NOTE — TELEPHONE ENCOUNTER
Patient currently has the 10-day Freestyle Armando sensor and reader.  He would like to get the new 14-day model now available.    Please send new Rx for sensor's and reader.    LOV 11-19-18  TL    Next 5 appointments (look out 90 days)    Trevin 15, 2019 11:00 AM CST  SHORT [facility] Pepe Spear MD, RV PROC RM [facility] [address] [place] [id number]  [phone]   Feb 26, 2019  9:00 AM CST  [facility] Visit with Vic [facility] MD  [facility] [address] [place] [id number] [phone]        RT Sera (R)

## 2019-01-04 RX ORDER — FLASH GLUCOSE SENSOR
KIT MISCELLANEOUS
Qty: 6 EACH | Refills: 3 | Status: SHIPPED | OUTPATIENT
Start: 2019-01-04 | End: 2019-12-13

## 2019-01-04 NOTE — TELEPHONE ENCOUNTER
Message noted, called patient.  I offered him a free sample Armando 14-day monitor (available at our clinic) and he will pick it up from our office later today.  The new Armando Personal 14-day sensor Rx now sent to his local pharmacy.  He thanked us for our assistance.    MAI Bustos MD  Endocrinology  [facility]

## 2019-01-04 NOTE — TELEPHONE ENCOUNTER
Pt calling to check the status of the Rx request- pt is down to the last 12 hours    He is requesting a 14 day model instead of 10 days     Ph. [phone] VM is okay

## 2019-01-15 ENCOUNTER — OFFICE VISIT (OUTPATIENT)
Dept: FAMILY MEDICINE | Facility: CLINIC | Age: 49
End: 2019-01-15
Payer: COMMERCIAL

## 2019-01-15 VITALS
DIASTOLIC BLOOD PRESSURE: 82 MMHG | TEMPERATURE: 99 F | BODY MASS INDEX: 35.08 KG/M2 | SYSTOLIC BLOOD PRESSURE: 156 MMHG | OXYGEN SATURATION: 98 % | WEIGHT: 259 LBS | HEIGHT: 72 IN | HEART RATE: 95 BPM

## 2019-01-15 DIAGNOSIS — Z51.81 MEDICATION MONITORING ENCOUNTER: ICD-10-CM

## 2019-01-15 DIAGNOSIS — L98.9 SKIN LESION: ICD-10-CM

## 2019-01-15 DIAGNOSIS — I10 HYPERTENSION GOAL BP (BLOOD PRESSURE) < 140/90: ICD-10-CM

## 2019-01-15 DIAGNOSIS — D48.5 NEOPLASM OF UNCERTAIN BEHAVIOR OF SKIN: Primary | ICD-10-CM

## 2019-01-15 DIAGNOSIS — Z79.4 TYPE 2 DIABETES MELLITUS WITH DIABETIC NEUROPATHY, WITH LONG-TERM CURRENT USE OF INSULIN (H): ICD-10-CM

## 2019-01-15 DIAGNOSIS — E11.21 TYPE 2 DIABETES MELLITUS WITH DIABETIC NEPHROPATHY, WITH LONG-TERM CURRENT USE OF INSULIN (H): ICD-10-CM

## 2019-01-15 DIAGNOSIS — Z79.4 TYPE 2 DIABETES MELLITUS WITH STABLE PROLIFERATIVE RETINOPATHY, WITH LONG-TERM CURRENT USE OF INSULIN, UNSPECIFIED LATERALITY (H): ICD-10-CM

## 2019-01-15 DIAGNOSIS — Z79.4 TYPE 2 DIABETES MELLITUS WITH DIABETIC NEPHROPATHY, WITH LONG-TERM CURRENT USE OF INSULIN (H): ICD-10-CM

## 2019-01-15 DIAGNOSIS — E11.40 TYPE 2 DIABETES MELLITUS WITH DIABETIC NEUROPATHY, WITH LONG-TERM CURRENT USE OF INSULIN (H): ICD-10-CM

## 2019-01-15 DIAGNOSIS — E78.5 HYPERLIPIDEMIA WITH TARGET LDL LESS THAN 70: ICD-10-CM

## 2019-01-15 DIAGNOSIS — E11.3559 TYPE 2 DIABETES MELLITUS WITH STABLE PROLIFERATIVE RETINOPATHY, WITH LONG-TERM CURRENT USE OF INSULIN, UNSPECIFIED LATERALITY (H): ICD-10-CM

## 2019-01-15 DIAGNOSIS — E66.01 MORBID OBESITY (H): ICD-10-CM

## 2019-01-15 DIAGNOSIS — E11.311 DIABETIC MACULAR EDEMA (H): ICD-10-CM

## 2019-01-15 PROCEDURE — 11104 PUNCH BX SKIN SINGLE LESION: CPT | Performed by: FAMILY MEDICINE

## 2019-01-15 PROCEDURE — 88305 TISSUE EXAM BY PATHOLOGIST: CPT | Performed by: FAMILY MEDICINE

## 2019-01-15 ASSESSMENT — MIFFLIN-ST. JEOR: SCORE: 2072.82

## 2019-01-15 NOTE — PROGRESS NOTES
SUBJECTIVE:   Braden De Leon is a 48 year old male who presents to clinic today for the following health issues:    Mole on back removal - see recent visit    DM    Lab Results   Component Value Date    A1C 6.5 10/09/2018    A1C 6.7 05/14/2018    A1C 6.6 01/30/2018    A1C 6.7 07/18/2017    A1C 7.4 04/11/2017     Recent Labs   Lab Test 10/09/18  1031 01/30/18  1110  05/12/15  1040 09/09/14  1017   CHOL 148 134   < > 167 174   HDL 40 42   < > 44 41   LDL 77 61   < > 97 86   TRIG 156* 153*   < > 129 235*   CHOLHDLRATIO  --   --   --  3.8 4.2    < > = values in this interval not displayed.     BP Readings from Last 3 Encounters:   01/15/19 156/82   11/30/18 136/86   11/19/18 140/81     Creatinine   Date Value Ref Range Status   10/09/2018 1.08 0.66 - 1.25 mg/dL Final     Problem list and histories reviewed & adjusted, as indicated.  Additional history: as documented    Patient Active Problem List   Diagnosis     Esophageal reflux     Hypertension goal BP (blood pressure) < 140/90     Type 2 diabetes, HbA1c goal < 7% (H)     Advanced directives, counseling/discussion     Migraine     Benign neoplasm of colon     Esophagitis     Esophageal stricture     Tarsal tunnel syndrome     Other enthesopathy of ankle and tarsus     ED (erectile dysfunction)     Hyperlipidemia with target LDL less than 70     Diabetic macular edema (H)     Type 2 diabetes mellitus with stable proliferative retinopathy, with long-term current use of insulin, unspecified laterality (H)     Cholelithiasis     Nephrolithiasis     Type 2 diabetes mellitus with diabetic nephropathy, with long-term current use of insulin (H)     Type 2 diabetes mellitus with diabetic neuropathy, with long-term current use of insulin (H)     Morbid obesity (H)       Past Medical History:   Diagnosis Date     Benign neoplasm of colon 11/07, 2/12    adenomatous polyps - due 5 yrs     Cholelithiasis      Diabetic macular edema (H)     bilateral - avastatin >10 each - Dr GAONA  Yogesh     ED (erectile dysfunction)      Esophageal reflux 1995     Esophageal stricture 8/13     Esophagitis 8/13    LA Grade A     Hyperlipidemia LDL goal < 70      Hypertension goal BP (blood pressure) < 140/90 3/09     Migraine 1986    rare migraines 2/yr     Nephrolithiasis 10/2018    4 mm - urology     Obesity (BMI 30.0-34.9)      Other premature beats 11/07    bigeminy     Proliferative diabetic retinopathy (H) 06/2017    [facility] - Avastatin - Dr Rosalva Zuñiga     Type 2 diabetes, HbA1c goal < 7% (H) 1992    Dr Bustos     Unspecified gastritis and gastroduodenitis without mention of hemorrhage 1995       Past Surgical History:   Procedure Laterality Date     COLONOSCOPY  11/07, 2/12    polyps x 2 - internal hemorrhoids - adenomatous polyps - due every 5 yrs     COLONOSCOPY N/A 2/21/2017    diverticula x 1 - due 5 yrs     ESOPHAGOSCOPY, GASTROSCOPY, DUODENOSCOPY (EGD), COMBINED  8/20/2013    Esophagitis LA Grade A, esophageal stricture     HC REPAIR ING HERNIA,5+Y/O,REDUCIBL  1978    rt     STRESS ECHO (METRO)  3/12    normal     Review of Systems:  Constitutional, HEENT, cardiovascular, pulmonary, GI, , musculoskeletal, neuro, skin, endocrine and psych systems are negative, except as otherwise noted.    Examination:    /82   Pulse 95   Temp 99  F (37.2  C) (Oral)   Ht 1.829 m (6')   Wt 117.5 kg (259 lb)   SpO2 98%   BMI 35.13 kg/m    EYES: Eyes grossly normal to inspection  MS: Extremities normal:  No gross deformities noted, normal strength, sensation, and circulation noted  SKIN: no suspicious lesions or rashes, 3 mm dark brown/black mole on left mid back laterally  NEURO: Normal strength and tone, sensory exam grossly normal, mentation intact, speech normal  PSYCH: mentation appears normal., affect normal/bright    Procedure:  4 mm punch biopsy    Consent:  Risks and benefits of procedure discussed, including:  bleeding, infection and scarring    Pattient desires to proceed, pause for cause  completed,    Procedure completed in room:  Procedure Room 1    Prep:  hibi-clens    Anesthesia:  1% lidocaine with epi    Notes:  Patient presented today with 3 mm dark brown/black mole on left mid back laterally. Lesion was prepped with hibi-clens and anesthetized with 1% lidocaine with epi. Punch biopsy was performed. Wound was repaired with one 4-0 Ethilon suture, Bactitracin, and dressings and lesion was sent to pathology for further analysis.    Wound repaired with:  One 4-0 Ethilon suture, Bacitracin, and dressings    Wound care discussed    Path Sent:  Yes      ICD-10-CM    1. Neoplasm of uncertain behavior of skin D48.5 Surgical pathology exam     HC PUNCH BIOPSY OF SKIN, FIRST LESION   2. Skin lesion L98.9 Surgical pathology exam     HC PUNCH BIOPSY OF SKIN, FIRST LESION   3. Type 2 diabetes mellitus with stable proliferative retinopathy, with long-term current use of insulin, unspecified laterality (H) E11.3559     Z79.4    4. Type 2 diabetes mellitus with diabetic nephropathy, with long-term current use of insulin (H) E11.21     Z79.4    5. Type 2 diabetes mellitus with diabetic neuropathy, with long-term current use of insulin (H) E11.40     Z79.4    6. Diabetic macular edema (H) E11.311    7. Hypertension goal BP (blood pressure) < 140/90 I10    8. Hyperlipidemia with target LDL less than 70 E78.5    9. Morbid obesity (H) E66.01    10. Medication monitoring encounter Z51.81        Discussed treatment/modality options, including risk and benefits, he desires advised aspirin 81 mg po daily, advised 1 multivitamin per day, advised dentist every 6 months, advised diet and exercise and advised opthalmologist every 1-2 years. All diagnosis above reviewed and noted above, otherwise stable.  See [employer] orders for further details.      Follow up in 1 week for suture removal, with BP recheck.    Health Maintenance Due   Topic Date Due     DTAP/TDAP/TD IMMUNIZATION (2 - Td) 04/01/2011     ZOSTER IMMUNIZATION (1  of 2) 03/18/2018     FIT Q1 YR  05/23/2018     PHQ-2 Q1 YR  01/30/2019     This document serves as a record of the services and decisions personally performed and made by Pepe Spear MD. It was created on his behalf by Eber Ernst, a trained medical scribe. The creation of this document is based on the provider's statements to the medical scribe.  Eber Ernst January 15, 2019 11:36 AM     The information in this document, created by the medical scribe for me, accurately reflects the services I personally performed and the decisions made by me. I have reviewed and approved this document for accuracy prior to leaving the patient care area.  January 15, 2019          Pepe Spear MD, [address]  [address] [phone] Fax

## 2019-01-17 PROBLEM — E66.01 MORBID OBESITY (H): Status: ACTIVE | Noted: 2019-01-17

## 2019-01-17 PROBLEM — E11.21 TYPE 2 DIABETES MELLITUS WITH DIABETIC NEPHROPATHY, WITH LONG-TERM CURRENT USE OF INSULIN (H): Status: ACTIVE | Noted: 2018-11-19

## 2019-01-17 PROBLEM — Z79.4 TYPE 2 DIABETES MELLITUS WITH DIABETIC NEPHROPATHY, WITH LONG-TERM CURRENT USE OF INSULIN (H): Status: ACTIVE | Noted: 2018-11-19

## 2019-01-17 PROBLEM — Z79.4 TYPE 2 DIABETES MELLITUS WITH DIABETIC NEUROPATHY, WITH LONG-TERM CURRENT USE OF INSULIN (H): Status: ACTIVE | Noted: 2018-11-19

## 2019-01-17 PROBLEM — E11.40 TYPE 2 DIABETES MELLITUS WITH DIABETIC NEUROPATHY, WITH LONG-TERM CURRENT USE OF INSULIN (H): Status: ACTIVE | Noted: 2018-11-19

## 2019-01-17 LAB — COPATH REPORT: NORMAL

## 2019-01-17 NOTE — PATIENT INSTRUCTIONS
[facility]                        To reach your care team during and after hours:   [phone]  To reach our pharmacy:        [phone]    Clinic Hours                        Our clinic hours are:    Monday   7:30 am to 7:00 pm                  Tuesday through Friday 7:30 am to 5:00 pm                             Saturday   8:00 am to 12:00 pm      Sunday   Closed      Pharmacy Hours                        Our pharmacy hours are:    Monday   8:30 am to 7:00 pm       Tuesday to Friday  8:30 am to 6:00 pm                       Saturday    9:00 am to 1:00 pm              Sunday    Closed              There is also information available at our web site:  [email or web address]    If your provider ordered any lab tests and you do not receive the results within 10 business days, please call the clinic.    If you need a medication refill please contact your pharmacy.  Please allow 2-3 business days for your refill to be completed.    Our clinic offers telephone visits and e visits.  Please ask one of your team members to explain more.      Use [employer] (secure email communication and access to your chart) to send your primary care provider a message or make an appointment. Ask someone on your Team how to sign up for [employer].  Immunizations                      Immunization History   Administered Date(s) Administered     TD (ADULT, 7+) 04/01/2001        Health Maintenance                         Health Maintenance Due   Topic Date Due     Diptheria Tetanus Pertussis (DTAP/TDAP/TD) Vaccine (2 - Td) 04/01/2011     Zoster (Chicken Pox) Vaccine (1 of 2) 03/18/2018     Colon Cancer Screening - FIT Test - yearly  05/23/2018     Depression Assessment 2 - yearly  01/30/2019

## 2019-01-21 DIAGNOSIS — L81.9 ATYPICAL PIGMENTED SKIN LESION: Primary | ICD-10-CM

## 2019-01-22 ENCOUNTER — ALLIED HEALTH/NURSE VISIT (OUTPATIENT)
Dept: NURSING | Facility: CLINIC | Age: 49
End: 2019-01-22
Payer: COMMERCIAL

## 2019-01-22 DIAGNOSIS — Z48.02 VISIT FOR SUTURE REMOVAL: Primary | ICD-10-CM

## 2019-01-22 PROCEDURE — 99207 ZZC NO CHARGE NURSE ONLY: CPT

## 2019-01-22 NOTE — PROGRESS NOTES
Suture removal:     Date sutures applied: 1/15/2019         Where (setting) in which they applied:Primary care clinic    Description:  Type: sutures and suture  Location: left mid back    History:    Cause of laceration: punch biopsy    Accompanying Signs & Symptoms: (staff: if yes-describe)  Redness: no  Warmth: no  Drainage: no  Still bleeding: no  Fevers: no    Last tetanus shot: last tetanus was 4/1/2001         Suture removed without incident. Patient tolerated procedure well.     Linda Hu, LADARIUS, RN, PHN   [facility]) [phone]

## 2019-01-25 DIAGNOSIS — K21.9 GASTROESOPHAGEAL REFLUX DISEASE WITHOUT ESOPHAGITIS: ICD-10-CM

## 2019-01-25 RX ORDER — OMEPRAZOLE 40 MG/1
CAPSULE, DELAYED RELEASE ORAL
Qty: 30 CAPSULE | Refills: 11 | Status: SHIPPED | OUTPATIENT
Start: 2019-01-25 | End: 2020-02-06

## 2019-01-25 NOTE — TELEPHONE ENCOUNTER
"Requested Prescriptions   Pending Prescriptions Disp Refills     omeprazole (PRILOSEC) 40 MG DR capsule [Pharmacy Med Name: OMEPRAZOLE 40MG CAPSULES] 30 capsule 0    Last Written Prescription Date:  12.26.18  Last Fill Quantity: 30,  # refills: 0   Last Office Visit: 1/15/2019   Future Office Visit:    Next 5 appointments (look out 90 days)    Feb 26, 2019  9:00 AM CST  Return Visit with Vic Bustos MD  [facility] ([address] [address] [phone]          Sig: TAKE ONE CAPSULE BY MOUTH EVERY DAY.    PPI Protocol Passed - 1/25/2019  3:26 AM       Passed - Not on Clopidogrel (unless Pantoprazole ordered)       Passed - No diagnosis of osteoporosis on record       Passed - Recent (12 mo) or future (30 days) visit within the authorizing provider's specialty    Patient had office visit in the last 12 months or has a visit in the next 30 days with authorizing provider or within the authorizing provider's specialty.  See \"Patient Info\" tab in inbasket, or \"Choose Columns\" in Meds & Orders section of the refill encounter.             Passed - Medication is active on med list       Passed - Patient is age 18 or older          "

## 2019-02-21 ENCOUNTER — TELEPHONE (OUTPATIENT)
Dept: ENDOCRINOLOGY | Facility: CLINIC | Age: 49
End: 2019-02-21

## 2019-02-21 NOTE — TELEPHONE ENCOUNTER
Prior Authorization Retail Medication Request    Medication/Dose: Victoza  ICD code (if different than what is on RX):  E11.3399, Z79.4  Previously Tried and Failed:  None  Rationale:  Patient stable on current medication    Insurance Name:  [employer]  Insurance ID:  [id number]      Pharmacy Information (if different than what is on RX)  Name:  [id number]  Phone:  [phone]

## 2019-02-25 ENCOUNTER — TRANSFERRED RECORDS (OUTPATIENT)
Dept: HEALTH INFORMATION MANAGEMENT | Facility: CLINIC | Age: 49
End: 2019-02-25

## 2019-02-26 ENCOUNTER — OFFICE VISIT (OUTPATIENT)
Dept: ENDOCRINOLOGY | Facility: CLINIC | Age: 49
End: 2019-02-26
Payer: COMMERCIAL

## 2019-02-26 VITALS
HEART RATE: 84 BPM | BODY MASS INDEX: 34.95 KG/M2 | HEIGHT: 72 IN | SYSTOLIC BLOOD PRESSURE: 147 MMHG | DIASTOLIC BLOOD PRESSURE: 85 MMHG | WEIGHT: 258 LBS

## 2019-02-26 DIAGNOSIS — I10 BENIGN ESSENTIAL HYPERTENSION: ICD-10-CM

## 2019-02-26 DIAGNOSIS — E11.3559 TYPE 2 DIABETES MELLITUS WITH STABLE PROLIFERATIVE RETINOPATHY, WITH LONG-TERM CURRENT USE OF INSULIN, UNSPECIFIED LATERALITY (H): Primary | ICD-10-CM

## 2019-02-26 DIAGNOSIS — Z79.4 TYPE 2 DIABETES MELLITUS WITH STABLE PROLIFERATIVE RETINOPATHY, WITH LONG-TERM CURRENT USE OF INSULIN, UNSPECIFIED LATERALITY (H): Primary | ICD-10-CM

## 2019-02-26 LAB — HBA1C MFR BLD: 6.4 % (ref 0–5.6)

## 2019-02-26 PROCEDURE — 84460 ALANINE AMINO (ALT) (SGPT): CPT | Performed by: INTERNAL MEDICINE

## 2019-02-26 PROCEDURE — 95251 CONT GLUC MNTR ANALYSIS I&R: CPT | Performed by: INTERNAL MEDICINE

## 2019-02-26 PROCEDURE — 36415 COLL VENOUS BLD VENIPUNCTURE: CPT | Performed by: INTERNAL MEDICINE

## 2019-02-26 PROCEDURE — 80048 BASIC METABOLIC PNL TOTAL CA: CPT | Performed by: INTERNAL MEDICINE

## 2019-02-26 PROCEDURE — 99214 OFFICE O/P EST MOD 30 MIN: CPT | Performed by: INTERNAL MEDICINE

## 2019-02-26 PROCEDURE — 83036 HEMOGLOBIN GLYCOSYLATED A1C: CPT | Performed by: INTERNAL MEDICINE

## 2019-02-26 RX ORDER — AMLODIPINE BESYLATE 5 MG/1
5 TABLET ORAL DAILY
Qty: 90 TABLET | Refills: 3 | Status: SHIPPED | OUTPATIENT
Start: 2019-02-26 | End: 2020-03-18

## 2019-02-26 ASSESSMENT — MIFFLIN-ST. JEOR: SCORE: 2068.28

## 2019-02-26 NOTE — PROGRESS NOTES
Name: Braden De Leon      Chief Complaint   Patient presents with     Diabetes     HPI:  Recent issues:  Here for f/u diabetes evaluation.    Feeling well overall, but some fatigue and achiness today  Had bilateral eye Avastin injections 1-week ago with Dr. Zuñiga        1997. Diagnosis of T2DM  Initial treatment with Glucophage x several months, but developed GI symptoms and discontinued  Changed to Glucotrol, then addition of Avandia  3/2005. Saw Dr. СЕРГЕЙ Luevano/Endocrinology  Previous hgbA1c's 8.4-9.1% range  Subsequent addition of basal insulin as QD... Then BID dosing routine  Has used Lantus, Levemir, then Tresiba (QD) and then Levemir.  Tresiba non formulary  Current DM meds:   Levemir 54U sq QAM and 58U sq QPM   Humalog U200 H40- H20 (-35)- H20 (-25) schedule    Humalog sscale 5U per 50>150    glimeparide 4 mg 1 1/2-tabs QPM    Victoza 1.2 mg sq QD (recalls that his pharmacy/insurance fills 1.8 mg dose)    Using Accucheck ? Meter, uses occasionally  12/2017 Began use of Freestyle Armando Personal CGMS device, likes it  Recent LibrePersonal CGMS data:      Previous [facility] labs include:  Lab Results   Component Value Date    A1C 6.4 (H) 02/26/2019     10/09/2018    POTASSIUM 3.7 10/09/2018    CHLORIDE 106 10/09/2018    CO2 30 10/09/2018    ANIONGAP 7 10/09/2018     (H) 10/09/2018    BUN 16 10/09/2018    CR 1.08 10/09/2018    GFRESTIMATED 72 10/09/2018    GFRESTBLACK 87 10/09/2018    SILVIANO 8.7 10/09/2018    CHOL 148 10/09/2018    TRIG 156 (H) 10/09/2018    HDL 40 10/09/2018    LDL 77 10/09/2018    NHDL 108 10/09/2018    UCRR 143 10/09/2018    MICROL 2,970 10/09/2018    UMALCR 2,076.92 (H) 10/09/2018     DM Complications:   Retinopathy    Previous laser PC? and Avastin eye injections OU    Sees Dr. BRIE Zuñiga/[facility]   Nephropathy    Microalbuminuria    Taking losartan daily (and lisinopril discontinued Fall '19)   Neuropathy    Decreased sensation vs numbness at feet      , wife Lela.   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 MAI Spear/ [facility] for gen med evaluations    PMH/PSH:  Past Medical History:   Diagnosis Date     Benign neoplasm of colon ,     adenomatous polyps - due 5 yrs     Cholelithiasis      Diabetic macular edema (H)     bilateral - avastatin >10 each - Dr CANDELARIA Zuñiga     ED (erectile dysfunction)      Esophageal reflux      Esophageal stricture      Esophagitis     LA Grade A     Hyperlipidemia LDL goal < 70      Hypertension goal BP (blood pressure) < 140/90 3/09     Migraine     rare migraines 2/yr     Nephrolithiasis 10/2018    4 mm - urology     Obesity (BMI 30.0-34.9)      Other premature beats     bigeminy     Proliferative diabetic retinopathy (H) 2017    [facility] - Avastatin - Dr Rosalva Zuñiga     Type 2 diabetes, HbA1c goal < 7% (H)     Dr Bustos     Unspecified gastritis and gastroduodenitis without mention of hemorrhage      Past Surgical History:   Procedure Laterality Date     COLONOSCOPY  ,     polyps x 2 - internal hemorrhoids - adenomatous polyps - due every 5 yrs     COLONOSCOPY N/A 2017    diverticula x 1 - due 5 yrs     ESOPHAGOSCOPY, GASTROSCOPY, DUODENOSCOPY (EGD), COMBINED  2013    Esophagitis LA Grade A, esophageal stricture     HC REPAIR ING HERNIA,5+Y/O,REDUCIBL      rt     STRESS ECHO (METRO)  3/12    normal       Family Hx:  Family History   Problem Relation Age of Onset     Hypertension Mother 58     Connective Tissue Disorder Father 70     Diabetes Father 55     Breast Cancer Maternal Grandmother      Chronic Obstructive Pulmonary Disease Paternal Grandfather      Other - See Comments Maternal Grandfather          in WWII     Other - See Comments Paternal Grandmother         Train     Other - See Comments Brother         MVA     Kidney Cancer Brother      Colon Cancer No family hx of          Social Hx:  Social History     Socioeconomic History     Marital  status:      Spouse name: Lela     Number of children: 1     Years of education: 13     Highest education level: Not on file   Occupational History     Occupation: manage restuarant     Employer: [employer]   Social Needs     Financial resource strain: Not on file     Food insecurity:     Worry: Not on file     Inability: Not on file     Transportation needs:     Medical: Not on file     Non-medical: Not on file   Tobacco Use     Smoking status: Never Smoker     Smokeless tobacco: Never Used   Substance and Sexual Activity     Alcohol use: No     Drug use: No     Sexual activity: Yes     Partners: Female   Lifestyle     Physical activity:     Days per week: Not on file     Minutes per session: Not on file     Stress: Not on file   Relationships     Social connections:     Talks on phone: Not on file     Gets together: Not on file     Attends Faith service: Not on file     Active member of club or organization: Not on file     Attends meetings of clubs or organizations: Not on file     Relationship status: Not on file     Intimate partner violence:     Fear of current or ex partner: Not on file     Emotionally abused: Not on file     Physically abused: Not on file     Forced sexual activity: Not on file   Other Topics Concern      Service Yes     Comment: [employer]     Blood Transfusions Not Asked     Caffeine Concern Yes     Comment: 24 oz diet     Occupational Exposure Not Asked     Hobby Hazards Not Asked     Sleep Concern Not Asked     Stress Concern Not Asked     Weight Concern Not Asked     Special Diet Not Asked     Back Care Not Asked     Exercise Yes     Comment: 3-5/wk, Ref's     Bike Helmet Not Asked     Seat Belt Yes     Self-Exams Not Asked     Parent/sibling w/ CABG, MI or angioplasty before 65F 55M? No   Social History Narrative     Not on file          MEDICATIONS:  has a current medication list which includes the following prescription(s): amlodipine, aspirin, blood glucose  monitoring, continuous blood glucose monitoring, glimepiride, insulin lispro, levemir flextouch, losartan, naproxen sodium, novofine plus, omeprazole, oxycodone-acetaminophen, rosuvastatin, semaglutide, sertraline, sildenafil, and victoza pen.    ROS:     ROS: 10 point ROS neg other than the symptoms noted above in the HPI.    GENERAL: mild fatigue; no weight changes, fevers, chills, night sweats.   HEENT: reports good vision acuity; no dysphagia, odonophagia, diplopia  THYROID:  no apparent hyper or hypothyroid symptoms  CV: no chest pain, pressure, palpitations, skipped beats  LUNGS: no SOB, LEAVITT, cough, wheezing   ABDOMEN: no diarrhea, constipation, abdominal pain  EXTREMITIES: no rashes, ulcers, edema  NEUROLOGY: decreased sensation at feet at nighttime; no headaches, denies changes in vision,   MSK: some arthralgias at knees, ankles, back; no apparent weakness  SKIN: no rashes or lesions  PSYCH:  stable mood, no significant anxiety or depression  ENDOCRINE: no heat or cold intolerance    Physical Exam   VS: /85   Pulse 84   Ht 1.829 m (6')   Wt 117 kg (258 lb)   BMI 34.99 kg/m    GENERAL: AXOX3, NAD, well dressed, answering questions appropriately, appears stated age.  HEENT:  Redness lateral sclera both eyes  THYROID:  normal gland, no apparent nodules or goiter  ABDOMEN: obese, soft, nontender, nondistended, no organomegaly  MSK: grossly intact  SKIN: scalp balding; no rashes, no lesions    LABS:    All pertinent notes, labs, and images personally reviewed by me.     A/P:  Encounter Diagnoses   Name Primary?     Type 2 diabetes mellitus with stable proliferative retinopathy, with long-term current use of insulin, unspecified laterality (H) Yes     Benign essential hypertension      Comments:  Reviewed health history and diabetes issues.  Recent glucose trends excellent.    Plan:  Discussed general issues with the diabetes mellitus diagnosis and management  Reviewed the [id number] test which  reflects previous overall glucose levels or control  Discussed the importance of blood glucose (BG) vs CGMS sensor glucose testing to assess glucose trends  Provided general overview of diabetes (oral and injectable) medication use  Reviewed recent LibrePersonal CGMS glucose trend data, in detail.    Recommend:  Discussed adjustments with the diabetes medications:   Lower dose as Levemir 54U sq QAM and 54U sq QPM   Humalog as H40- H20 (-35)- H20 (-25) schedule    Humalog sscale 5U per 50>150    Raise dose as glimeparide 4 mg 1 1/2-tabs QPM    Victoza 1.2 mg sq QD (recalls that his pharmacy/insurance fills 1.8 mg dose)  Consider change from Victoza to Ozempic, if less expensive   Gave paper Ozempic Rx to use for price-check at local pharmacy   Let me know outcome of this pharmacy check  Check labs today  Discussed higher BP level and htn management   Add amlodipine 5 mg daily, new Rx sent to pharmacy   Needs close monitoring of BP, blood Cr and BUN, urine microalbumin ratio   Consider nephrology consultation  Keep focus on diet, exercise, weight management  Advise having fasting lipid panel testing and dilated eye examination, at least annually    Addressed patient questions today    Labs ordered today:   Orders Placed This Encounter   Procedures     GLUCOSE MONITOR, 72 HOUR, PHYS INTERP     Hemoglobin A1c     Basic metabolic panel     ALT     Radiology/Consults ordered today: None    More than 50% of the time spent with Mr. De Leon on counseling / coordinating his care.  Total appointment time was 30 minutes.      Follow-up:  3 mo.    Vic Bustos MD  Endocrinology  [facility]  Copy:  MAI Spear

## 2019-02-27 LAB
ALT SERPL W P-5'-P-CCNC: 26 U/L (ref 0–70)
ANION GAP SERPL CALCULATED.3IONS-SCNC: 8 MMOL/L (ref 3–14)
BUN SERPL-MCNC: 20 MG/DL (ref 7–30)
CALCIUM SERPL-MCNC: 9.2 MG/DL (ref 8.5–10.1)
CHLORIDE SERPL-SCNC: 109 MMOL/L (ref 94–109)
CO2 SERPL-SCNC: 27 MMOL/L (ref 20–32)
CREAT SERPL-MCNC: 1.33 MG/DL (ref 0.66–1.25)
GFR SERPL CREATININE-BSD FRML MDRD: 61 ML/MIN/{1.73_M2}
GLUCOSE SERPL-MCNC: 112 MG/DL (ref 70–99)
POTASSIUM SERPL-SCNC: 3.9 MMOL/L (ref 3.4–5.3)
SODIUM SERPL-SCNC: 144 MMOL/L (ref 133–144)

## 2019-03-01 NOTE — TELEPHONE ENCOUNTER
Central Prior Authorization Team   Phone: [phone]    PA Initiation    Medication: Victoza  Insurance Company: [employer] ([facility]) - Phone [phone] Fax [phone]  Pharmacy Filling the Rx: [email or web address] [id number] [place] [address] [address] [facility] [id number]  Filling Pharmacy Phone: [phone]  Filling Pharmacy Fax: [phone]  Start Date: 3/1/2019

## 2019-03-02 NOTE — TELEPHONE ENCOUNTER
Prior Authorization Approval    Authorization Effective Date: 3/1/2019  Authorization Expiration Date: 3/1/2020  Medication: Victoza-APPROVED  Approved Dose/Quantity:    Reference #:     Insurance Company: [employer] ([facility]) - Phone [phone] Fax [phone]  Expected CoPay:       CoPay Card Available:      Foundation Assistance Needed:    Which Pharmacy is filling the prescription (Not needed for infusion/clinic administered): [facility] [address] [place] [address] [address]  AT [facility] [id number]  Pharmacy Notified: Yes  Patient Notified: Yes

## 2019-03-09 ENCOUNTER — MYC MEDICAL ADVICE (OUTPATIENT)
Dept: ENDOCRINOLOGY | Facility: CLINIC | Age: 49
End: 2019-03-09

## 2019-03-21 ENCOUNTER — TELEPHONE (OUTPATIENT)
Dept: FAMILY MEDICINE | Facility: CLINIC | Age: 49
End: 2019-03-21

## 2019-03-21 NOTE — TELEPHONE ENCOUNTER
Needs of attention regarding:  -High Blood Pressure  BP Readings from Last 6 Encounters:   02/26/19 147/85   01/15/19 156/82   11/30/18 136/86   11/19/18 140/81   10/15/18 154/88   10/09/18 (!) 156/100       Health Maintenance Topics with due status: Overdue       Topic Date Due    DTAP/TDAP/TD IMMUNIZATION 04/01/2011    ZOSTER IMMUNIZATION 03/18/2018    FIT Q1 YR 05/23/2018    PHQ-2 Q1 YR 01/30/2019       Communication:  Patient called he will stop by a [facility] to have BP checked as this is more convenient. Thais Ramírez CMA

## 2019-05-21 DIAGNOSIS — E11.9 TYPE 2 DIABETES MELLITUS WITHOUT COMPLICATION, WITH LONG-TERM CURRENT USE OF INSULIN (H): ICD-10-CM

## 2019-05-21 DIAGNOSIS — Z79.4 TYPE 2 DIABETES MELLITUS WITHOUT COMPLICATION, WITH LONG-TERM CURRENT USE OF INSULIN (H): ICD-10-CM

## 2019-05-21 NOTE — TELEPHONE ENCOUNTER
"  LEVEMIR FLEXTOUCH 100 UNIT/ML injection 45 mL 11 5/14/2018         Last Written Prescription Date:  05/14/2018  Last Fill Quantity: 45 Ml,  # refills: 11   Last office visit: 2/26/2019 with prescribing provider:     Future Office Visit:   Next 5 appointments (look out 90 days)    Jun 25, 2019  9:00 AM CDT  Return Visit with Vic Bustos MD  [facility] ([address] [address] [phone]           Requested Prescriptions   Pending Prescriptions Disp Refills     LEVEMIR FLEXTOUCH 100 UNIT/ML pen [Pharmacy Med Name: LEVEMIR FLEX TOUCH PEN INJ 3ML] 45 mL 0     Sig: INJECT 54 TO 60 UNITS SUBCUTANEOUS TWICE DAILY, AS DIRECTED       Long Acting Insulin Protocol Failed - 5/21/2019  5:19 PM        Failed - Blood pressure less than 140/90 in past 6 months     BP Readings from Last 3 Encounters:   02/26/19 147/85   01/15/19 156/82   11/30/18 136/86                 Passed - LDL on file in past 12 months     Recent Labs   Lab Test 10/09/18  1031   LDL 77             Passed - Microalbumin on file in past 12 months     Recent Labs   Lab Test 10/09/18  1032   MICROL 2,970   UMALCR 2,076.92*             Passed - Serum creatinine on file in past 12 months     Recent Labs   Lab Test 02/26/19  0940   CR 1.33*             Passed - HgbA1C in past 3 or 6 months     If HgbA1C is 8 or greater, it needs to be on file within the past 3 months.  If less than 8, must be on file within the past 6 months.     Recent Labs   Lab Test 02/26/19  0940   A1C 6.4*             Passed - Medication is active on med list        Passed - Patient is age 18 or older        Passed - Recent (6 mo) or future (30 days) visit within the authorizing provider's specialty     Patient had office visit in the last 6 months or has a visit in the next 30 days with authorizing provider or within the authorizing provider's specialty.  See \"Patient Info\" tab in inbasket, or \"Choose " "Columns\" in Meds & Orders section of the refill encounter.              "

## 2019-05-23 RX ORDER — INSULIN DETEMIR 100 [IU]/ML
INJECTION, SOLUTION SUBCUTANEOUS
Qty: 45 ML | Refills: 0 | Status: SHIPPED | OUTPATIENT
Start: 2019-05-23 | End: 2019-07-02

## 2019-05-23 NOTE — TELEPHONE ENCOUNTER
Prescription approved per [facility] Refill Protocol.  Krystal MERCER RN

## 2019-06-25 ENCOUNTER — OFFICE VISIT (OUTPATIENT)
Dept: ENDOCRINOLOGY | Facility: CLINIC | Age: 49
End: 2019-06-25
Payer: COMMERCIAL

## 2019-06-25 VITALS
BODY MASS INDEX: 34.36 KG/M2 | HEIGHT: 72 IN | WEIGHT: 253.7 LBS | DIASTOLIC BLOOD PRESSURE: 77 MMHG | HEART RATE: 75 BPM | SYSTOLIC BLOOD PRESSURE: 132 MMHG

## 2019-06-25 DIAGNOSIS — E11.3559 TYPE 2 DIABETES MELLITUS WITH STABLE PROLIFERATIVE RETINOPATHY, WITH LONG-TERM CURRENT USE OF INSULIN, UNSPECIFIED LATERALITY (H): Primary | ICD-10-CM

## 2019-06-25 DIAGNOSIS — Z79.4 TYPE 2 DIABETES MELLITUS WITH DIABETIC NEUROPATHY, WITH LONG-TERM CURRENT USE OF INSULIN (H): ICD-10-CM

## 2019-06-25 DIAGNOSIS — E11.21 TYPE 2 DIABETES MELLITUS WITH DIABETIC NEPHROPATHY, WITH LONG-TERM CURRENT USE OF INSULIN (H): ICD-10-CM

## 2019-06-25 DIAGNOSIS — Z79.4 TYPE 2 DIABETES MELLITUS WITH DIABETIC NEPHROPATHY, WITH LONG-TERM CURRENT USE OF INSULIN (H): ICD-10-CM

## 2019-06-25 DIAGNOSIS — Z79.4 TYPE 2 DIABETES MELLITUS WITH STABLE PROLIFERATIVE RETINOPATHY, WITH LONG-TERM CURRENT USE OF INSULIN, UNSPECIFIED LATERALITY (H): Primary | ICD-10-CM

## 2019-06-25 DIAGNOSIS — E11.40 TYPE 2 DIABETES MELLITUS WITH DIABETIC NEUROPATHY, WITH LONG-TERM CURRENT USE OF INSULIN (H): ICD-10-CM

## 2019-06-25 LAB
ANION GAP SERPL CALCULATED.3IONS-SCNC: 10 MMOL/L (ref 3–14)
BUN SERPL-MCNC: 25 MG/DL (ref 7–30)
CALCIUM SERPL-MCNC: 9.2 MG/DL (ref 8.5–10.1)
CHLORIDE SERPL-SCNC: 110 MMOL/L (ref 94–109)
CHOLEST SERPL-MCNC: 133 MG/DL
CO2 SERPL-SCNC: 22 MMOL/L (ref 20–32)
CREAT SERPL-MCNC: 1.46 MG/DL (ref 0.66–1.25)
GFR SERPL CREATININE-BSD FRML MDRD: 55 ML/MIN/{1.73_M2}
GLUCOSE SERPL-MCNC: 121 MG/DL (ref 70–99)
HBA1C MFR BLD: 7.4 % (ref 0–5.6)
HDLC SERPL-MCNC: 44 MG/DL
LDLC SERPL CALC-MCNC: 57 MG/DL
NONHDLC SERPL-MCNC: 89 MG/DL
POTASSIUM SERPL-SCNC: 4.2 MMOL/L (ref 3.4–5.3)
SODIUM SERPL-SCNC: 142 MMOL/L (ref 133–144)
TRIGL SERPL-MCNC: 162 MG/DL

## 2019-06-25 PROCEDURE — 36415 COLL VENOUS BLD VENIPUNCTURE: CPT | Performed by: INTERNAL MEDICINE

## 2019-06-25 PROCEDURE — 95251 CONT GLUC MNTR ANALYSIS I&R: CPT | Performed by: INTERNAL MEDICINE

## 2019-06-25 PROCEDURE — 83036 HEMOGLOBIN GLYCOSYLATED A1C: CPT | Performed by: INTERNAL MEDICINE

## 2019-06-25 PROCEDURE — 80061 LIPID PANEL: CPT | Performed by: INTERNAL MEDICINE

## 2019-06-25 PROCEDURE — 80048 BASIC METABOLIC PNL TOTAL CA: CPT | Performed by: INTERNAL MEDICINE

## 2019-06-25 PROCEDURE — 99214 OFFICE O/P EST MOD 30 MIN: CPT | Performed by: INTERNAL MEDICINE

## 2019-06-25 ASSESSMENT — MIFFLIN-ST. JEOR: SCORE: 2043.78

## 2019-06-25 NOTE — PROGRESS NOTES
Name: Braden De Leon    Chief Complaint   Patient presents with     Diabetes     HPI:  Recent issues:  Here for f/u diabetes evaluation, with his wife Lela today  He recently stopped the Victoza due to higher cost (of it and Levemir), increased dose glimeparide  Feeling well overall, had bilateral eye Avastin injections earlier today with Dr. Zuñiga        1997. Diagnosis of T2DM  Initial treatment with Glucophage x several months, but developed GI symptoms and discontinued  Changed to Glucotrol, then addition of Avandia  3/2005. Saw Dr. СЕРГЕЙ Luevano/Endocrinology  Previous hgbA1c's 8.4-9.1% range  Subsequent addition of basal insulin as QD... Then BID dosing routine  Has used Lantus, Levemir, then Tresiba (QD) and then Levemir.  Tresiba non formulary  5/2019. Stopped Victoza, due to higher expense  Current DM meds:   Levemir 54U sq QAM and 64U sq QPM   Humalog U200 H40- H20 (-35)- H20 (-25) schedule    Humalog sscale 5U per 50>150    glimeparide 4 mg 2-tabs QPM     Using Accucheck ? Meter, uses occasionally  12/2017 Began use of Freestyle Armando Personal CGMS device, likes it  Recent LibrePersonal CGMS data:      Previous [facility] labs include:  Lab Results   Component Value Date    A1C 7.4 (H) 06/25/2019     06/25/2019    POTASSIUM 4.2 06/25/2019    CHLORIDE 110 (H) 06/25/2019    CO2 22 06/25/2019    ANIONGAP 10 06/25/2019     (H) 06/25/2019    BUN 25 06/25/2019    CR 1.46 (H) 06/25/2019    GFRESTIMATED 55 (L) 06/25/2019    GFRESTBLACK 64 06/25/2019    SILVIANO 9.2 06/25/2019    CHOL 133 06/25/2019    TRIG 162 (H) 06/25/2019    HDL 44 06/25/2019    LDL 57 06/25/2019    NHDL 89 06/25/2019    UCRR 143 10/09/2018    MICROL 2,970 10/09/2018    UMALCR 2,076.92 (H) 10/09/2018     DM Complications:   Retinopathy    Previous laser PC? and Avastin eye injections OU    Sees Dr. BRIE Zuñiga/[facility]   Nephropathy    Microalbuminuria    Taking losartan daily (and lisinopril discontinued Fall  '19)   Neuropathy    Decreased sensation vs numbness at feet      , wife Lela.  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 MAI Spear/ [facility] for gen med evaluations    PMH/PSH:  Past Medical History:   Diagnosis Date     Atypical nevus 2019    Dr Wright     Benign neoplasm of colon ,     adenomatous polyps - due 5 yrs     Cholelithiasis      Diabetic macular edema (H)     bilateral - avastatin >10 each - Dr CANDELARIA Zuñiga     ED (erectile dysfunction)      Esophageal reflux      Esophageal stricture      Esophagitis     LA Grade A     Hyperlipidemia LDL goal < 70      Hypertension goal BP (blood pressure) < 140/90 3/09     Migraine     rare migraines 2/yr     Nephrolithiasis 10/2018    4 mm - urology     Obesity (BMI 30.0-34.9)      Other premature beats     bigeminy     Proliferative diabetic retinopathy (H) 2017    OU - Avastatin - Dr Rosalva Zuñiga     Type 2 diabetes, HbA1c goal < 7% (H)     Dr Bustos     Unspecified gastritis and gastroduodenitis without mention of hemorrhage      Past Surgical History:   Procedure Laterality Date     COLONOSCOPY  ,     polyps x 2 - internal hemorrhoids - adenomatous polyps - due every 5 yrs     COLONOSCOPY N/A 2017    diverticula x 1 - due 5 yrs     ESOPHAGOSCOPY, GASTROSCOPY, DUODENOSCOPY (EGD), COMBINED  2013    Esophagitis LA Grade A, esophageal stricture     HC REPAIR ING HERNIA,5+Y/O,REDUCIBL      rt     STRESS ECHO ([facility]RO)  3/12    normal       Family Hx:  Family History   Problem Relation Age of Onset     Hypertension Mother 58     Connective Tissue Disorder Father 70     Diabetes Father 55     Breast Cancer Maternal Grandmother      Chronic Obstructive Pulmonary Disease Paternal Grandfather      Other - See Comments Maternal Grandfather          in WWII     Other - See Comments Paternal Grandmother         Train     Other - See Comments Brother         MVA     Kidney  Cancer Brother      Colon Cancer No family hx of          Social Hx:  Social History     Socioeconomic History     Marital status:      Spouse name: Lela     Number of children: 1     Years of education: 13     Highest education level: Not on file   Occupational History     Occupation: manage restuarant     Employer: [employer]   Social Needs     Financial resource strain: Not on file     Food insecurity:     Worry: Not on file     Inability: Not on file     Transportation needs:     Medical: Not on file     Non-medical: Not on file   Tobacco Use     Smoking status: Never Smoker     Smokeless tobacco: Never Used   Substance and Sexual Activity     Alcohol use: No     Drug use: No     Sexual activity: Yes     Partners: Female   Lifestyle     Physical activity:     Days per week: Not on file     Minutes per session: Not on file     Stress: Not on file   Relationships     Social connections:     Talks on phone: Not on file     Gets together: Not on file     Attends Yazidism service: Not on file     Active member of club or organization: Not on file     Attends meetings of clubs or organizations: Not on file     Relationship status: Not on file     Intimate partner violence:     Fear of current or ex partner: Not on file     Emotionally abused: Not on file     Physically abused: Not on file     Forced sexual activity: Not on file   Other Topics Concern      Service Yes     Comment: [employer]     Blood Transfusions Not Asked     Caffeine Concern Yes     Comment: 24 oz diet     Occupational Exposure Not Asked     Hobby Hazards Not Asked     Sleep Concern Not Asked     Stress Concern Not Asked     Weight Concern Not Asked     Special Diet Not Asked     Back Care Not Asked     Exercise Yes     Comment: 3-5/wk, Ref's     Bike Helmet Not Asked     Seat Belt Yes     Self-Exams Not Asked     Parent/sibling w/ CABG, MI or angioplasty before 65F 55M? No   Social History Narrative     Not on file           MEDICATIONS:  has a current medication list which includes the following prescription(s): amlodipine, aspirin, continuous blood glucose monitoring, glimepiride, insulin degludec-liraglutide, insulin lispro, levemir flextouch, losartan, naproxen sodium, omeprazole, rosuvastatin, sertraline, victoza pen, blood glucose monitoring, novofine plus, and sildenafil.    ROS:     ROS: 10 point ROS neg other than the symptoms noted above in the HPI.    GENERAL: mild fatigue; no weight changes, fevers, chills, night sweats.   HEENT: reports good vision acuity (but not today w/ eye patches), no dysphagia, odonophagia, diplopia  THYROID:  no apparent hyper or hypothyroid symptoms  CV: no chest pain, pressure, palpitations, skipped beats  LUNGS: no SOB, LEAVITT, cough, wheezing   ABDOMEN: no diarrhea, constipation, abdominal pain  EXTREMITIES: no rashes, ulcers, edema  NEUROLOGY: decreased sensation at feet at nighttime; no headaches, denies changes in vision,   MSK: some arthralgias at knees, ankles, back; no apparent weakness  SKIN: no rashes or lesions  PSYCH:  stable mood, no significant anxiety or depression  ENDOCRINE: no heat or cold intolerance    Physical Exam   VS: /77   Pulse 75   Ht 1.829 m (6')   Wt 115.1 kg (253 lb 11.2 oz)   BMI 34.41 kg/m    GENERAL: AXOX3, NAD, well dressed, answering questions appropriately, appears stated age.  HEENT:  White eye patches both eyes today  THYROID:  normal gland, no apparent nodules or goiter  ABDOMEN: obese, soft, nontender, nondistended, no organomegaly  MSK: grossly intact  SKIN: scalp balding; no rashes, no lesions    LABS:    All pertinent notes, labs, and images personally reviewed by me.     A/P:  Encounter Diagnoses   Name Primary?     Type 2 diabetes mellitus with stable proliferative retinopathy, with long-term current use of insulin, unspecified laterality (H) Yes     Type 2 diabetes mellitus with diabetic nephropathy, with long-term current use of insulin (H)       Type 2 diabetes mellitus with diabetic neuropathy, with long-term current use of insulin (H)      Comments:  Reviewed health history and diabetes issues.    Plan:  Discussed general issues with the diabetes mellitus diagnosis and management  Reviewed the [id number] test which reflects previous overall glucose levels or control  Discussed the importance of blood glucose (BG) vs CGMS sensor glucose testing to assess glucose trends  Provided general overview of diabetes (oral and injectable) medication use  Reviewed recent LibrePersonal CGMS glucose trend data, in detail.    Recommend:  Discussed adjustments with the diabetes medications:   Lower evening dose of basal insulin, new dosing as Levemir 54U sq QAM and 60U sq QPM   Continue Humalog as H40- H20 (-35)- H20 (-25) schedule    Humalog sscale 5U per 50>150    Continue current glimeparide 4 mg 2-tabs po QPM     Consider change from Levemir (and Victoza) to Xultophy   Discussed Xultophy option, gave them paper Rx and discount card   Let me know outcome of this pharmacy check   Discussed a transition plan using lower dose Xultophy with Levemir every day   Contact me with feedback on Xultophy- pharmacy check  Check labs today  Needs close monitoring of BP, blood Cr and BUN, urine microalbumin ratio   Consider nephrology consultation  Keep focus on diet, exercise, weight management  Advise having fasting lipid panel testing and dilated eye examination, at least annually  Keep regular followup ophthalmology evaluations also    Addressed patient questions today    Labs ordered today:   Orders Placed This Encounter   Procedures     GLUCOSE MONITOR, 72 HOUR, PHYS INTERP     Hemoglobin A1c     Basic metabolic panel     Lipid panel reflex to direct LDL Fasting     Radiology/Consults ordered today: None    More than 50% of the time spent with Mr. De Leon on counseling / coordinating his care.  Total appointment time was 30 minutes.    Follow-up:  3 mo.    Vic  Akash MITCHELL  Endocrinology  [facility]

## 2019-07-08 ENCOUNTER — TELEPHONE (OUTPATIENT)
Dept: FAMILY MEDICINE | Facility: CLINIC | Age: 49
End: 2019-07-08

## 2019-07-08 NOTE — TELEPHONE ENCOUNTER
Prior Authorization Retail Medication Request    Medication/Dose: Xultophy 100-3.6 unit-mg/mL  ICD code (if different than what is on RX):  E11.3559, Z79.4  Previously Tried and Failed:  Levemir, Victoza  Rationale:  Patient originally prescribed Levemir and Victoza but has since discontinued use of these medications due to cost and would benefit more from having combined medication    Insurance Name:  [id number]  Insurance ID:  [id number]      Pharmacy Information (if different than what is on RX)  [facility]:  [id number]  Phone:  [phone]    CoverMeds key: [id number]

## 2019-07-11 NOTE — TELEPHONE ENCOUNTER
Central Prior Authorization Team   Phone: [phone]    PA Initiation    Medication: Xultophy 100-3.6 unit-mg/mL  Insurance Company: [employer] ([facility]) - Phone [phone] Fax [phone]  Pharmacy Filling the Rx: [facility] [address] [place] [address] [address] AT [facility] [address]  Filling Pharmacy Phone: [phone]  Filling Pharmacy Fax:    Start Date: 7/11/2019

## 2019-07-11 NOTE — TELEPHONE ENCOUNTER
Central Prior Authorization Team   Phone: [phone]    PRIOR AUTHORIZATION DENIED    Medication: Xultophy 100-3.6 unit-mg/mL    Denial Date: 7/11/2019    Denial Rational: Medication is a plan exclusion.        Appeal Information: None, medication is excluded from benefits.    If the treating physician would like to discuss this coverage decision with the physician or health care professional reviewer,  please call OptumRxPrior Authorization department at [phone]

## 2019-07-23 ENCOUNTER — MYC MEDICAL ADVICE (OUTPATIENT)
Dept: ENDOCRINOLOGY | Facility: CLINIC | Age: 49
End: 2019-07-23

## 2019-08-01 ENCOUNTER — MYC MEDICAL ADVICE (OUTPATIENT)
Dept: ENDOCRINOLOGY | Facility: CLINIC | Age: 49
End: 2019-08-01

## 2019-09-09 ENCOUNTER — TELEPHONE (OUTPATIENT)
Dept: FAMILY MEDICINE | Facility: CLINIC | Age: 49
End: 2019-09-09

## 2019-09-09 NOTE — TELEPHONE ENCOUNTER
Reason for Call:  Form, our goal is to have forms completed with 72 hours, however, some forms may require a visit or additional information.    Type of letter, form or note:  Confirmation of Order    Who is the form from?: [facility], and [facility] (if other please explain)    Where did the form come from: form was faxed in    What clinic location was the form placed at?: [place]    Where the form was placed: Providers folder - MA's desk - North side    What number is listed as a contact on the form?: [phone]       Additional comments: Please sign and fax to [phone]    Call taken on 9/9/2019 at 6:37 PM by Lyndsay Sanchez CMA

## 2019-09-10 ENCOUNTER — MEDICAL CORRESPONDENCE (OUTPATIENT)
Dept: HEALTH INFORMATION MANAGEMENT | Facility: CLINIC | Age: 49
End: 2019-09-10

## 2019-09-11 NOTE — TELEPHONE ENCOUNTER
Completed forms faxed back to [facility] at [phone].   Originals sent to be scanned.     Anushka Bray

## 2019-10-01 ENCOUNTER — TELEPHONE (OUTPATIENT)
Dept: FAMILY MEDICINE | Facility: CLINIC | Age: 49
End: 2019-10-01

## 2019-10-01 DIAGNOSIS — R45.86 MOOD CHANGES: ICD-10-CM

## 2019-10-01 RX ORDER — SERTRALINE HYDROCHLORIDE 100 MG/1
TABLET, FILM COATED ORAL
Qty: 180 TABLET | Refills: 0 | Status: SHIPPED | OUTPATIENT
Start: 2019-10-01 | End: 2020-01-14

## 2019-10-01 NOTE — TELEPHONE ENCOUNTER
Routing refill request to provider for review/approval because:  Drug interaction warning    A 90 day supply is pended, patient is due for an office visit.  Please call to  assist the patient in scheduling an appointment.    ANGIE Cuba, RN  [facility] Triage

## 2019-10-01 NOTE — LETTER
[facility] - [address] [address]                                                                                                       [phone]    October 4, 2019    Braden ORDOÑEZ Moises  [address]  [facility] [address]      To Whom it May Concern:    Thank you for your refill request for your sertraline (ZOLOFT) 100 MG tablet.   After reviewing your chart, you are due for an updated PHQ-9 and MIGUEL-7, which are questionnaires regarding your mood over the last 2 weeks.   Please fill them out and send it back to me.     Also, you are due for a fasting physical after 11/30/2019 - I would advise scheduling this sooner as Dr. Spear is booking out at this time.    Thank you for your time.    Rosalind Lu RN  [facility]         Sincerely,        Pepe Spear M.D./HODAN FERRO

## 2019-10-01 NOTE — TELEPHONE ENCOUNTER
"Requested Prescriptions   Pending Prescriptions Disp Refills     sertraline (ZOLOFT) 100 MG tablet [Pharmacy Med Name: SERTRALINE 100MG TABLETS]            Last Written Prescription Date:  10.9.18  Last Fill Quantity: 180 tablet,  # refills: 3   Last office visit: 1/15/2019 with prescribing provider:  Pepe Spear MD           Future Office Visit:       180 tablet 0     Sig: TAKE 2 TABLETS(200 MG) BY MOUTH DAILY       SSRIs Protocol Passed - 10/1/2019  5:08 AM         PHQ-9 SCORE 6/27/2017 12/28/2017 1/30/2018   PHQ-9 Total Score - - -   PHQ-9 Total Score MyChart - - -   PHQ-9 Total Score 9 5 8     MIGUEL-7 SCORE 8/10/2016 9/13/2016 1/30/2018   Total Score - - -   Total Score 5 (mild anxiety) - -   Total Score - 3 4              Passed - Recent (12 mo) or future (30 days) visit within the authorizing provider's specialty     Patient has had an office visit with the authorizing provider or a provider within the authorizing providers department within the previous 12 mos or has a future within next 30 days. See \"Patient Info\" tab in inbasket, or \"Choose Columns\" in Meds & Orders section of the refill encounter.              Passed - Medication is active on med list        Passed - Patient is age 18 or older        "

## 2019-10-04 NOTE — TELEPHONE ENCOUNTER
Patient has not responded to last 2 [facility] Messages    Attempt #3  Called patient @ [phone] - Left a non-detailed message to call back and speak with any triage nurse.    Letter sent with PHQ9/GAD7 and self-addressed, stamped, return envelope  Closing encounter    Rosalind Lu RN  [facility] Triage

## 2019-10-21 ASSESSMENT — ANXIETY QUESTIONNAIRES
3. WORRYING TOO MUCH ABOUT DIFFERENT THINGS: MORE THAN HALF THE DAYS
6. BECOMING EASILY ANNOYED OR IRRITABLE: NOT AT ALL
GAD7 TOTAL SCORE: 7
7. FEELING AFRAID AS IF SOMETHING AWFUL MIGHT HAPPEN: MORE THAN HALF THE DAYS
2. NOT BEING ABLE TO STOP OR CONTROL WORRYING: SEVERAL DAYS
5. BEING SO RESTLESS THAT IT IS HARD TO SIT STILL: NOT AT ALL
1. FEELING NERVOUS, ANXIOUS, OR ON EDGE: SEVERAL DAYS
IF YOU CHECKED OFF ANY PROBLEMS ON THIS QUESTIONNAIRE, HOW DIFFICULT HAVE THESE PROBLEMS MADE IT FOR YOU TO DO YOUR WORK, TAKE CARE OF THINGS AT HOME, OR GET ALONG WITH OTHER PEOPLE: SOMEWHAT DIFFICULT

## 2019-10-21 ASSESSMENT — PATIENT HEALTH QUESTIONNAIRE - PHQ9
SUM OF ALL RESPONSES TO PHQ QUESTIONS 1-9: 8
5. POOR APPETITE OR OVEREATING: SEVERAL DAYS

## 2019-10-22 ASSESSMENT — ANXIETY QUESTIONNAIRES: GAD7 TOTAL SCORE: 7

## 2019-10-23 ENCOUNTER — MYC MEDICAL ADVICE (OUTPATIENT)
Dept: GENERAL RADIOLOGY | Facility: CLINIC | Age: 49
End: 2019-10-23

## 2019-10-23 DIAGNOSIS — E11.3399 TYPE 2 DIABETES MELLITUS WITH MODERATE NONPROLIFERATIVE RETINOPATHY, WITH LONG-TERM CURRENT USE OF INSULIN, MACULAR EDEMA PRESENCE UNSPECIFIED, UNSPECIFIED LATERALITY (H): ICD-10-CM

## 2019-10-23 DIAGNOSIS — Z79.4 TYPE 2 DIABETES MELLITUS WITH MODERATE NONPROLIFERATIVE RETINOPATHY, WITH LONG-TERM CURRENT USE OF INSULIN, MACULAR EDEMA PRESENCE UNSPECIFIED, UNSPECIFIED LATERALITY (H): ICD-10-CM

## 2019-10-23 NOTE — TELEPHONE ENCOUNTER
Fax from [facility] in [place] asking for refill of Victoza pen.    Per LOV 6-25-19 TL notes: patient has stopped Victoza due to cost.    Attune message sent to patient to verify if he has/has not restarted Victoza medication.  Also is overdue for OV with TL.    Vanessa Carrasco, RT (R)

## 2019-10-26 RX ORDER — LIRAGLUTIDE 6 MG/ML
1.8 INJECTION SUBCUTANEOUS EVERY MORNING
Qty: 9 ML | Refills: 11 | Status: SHIPPED | OUTPATIENT
Start: 2019-10-26 | End: 2021-05-07

## 2019-10-26 NOTE — TELEPHONE ENCOUNTER
Message noted.  I am not certain if patient using the Victoza medication, though called him today... no answer, VM message left.  I have renewed the Victoza Rx to his pharmacy, as requested.  I also asked him to message me if having any difficulty getting a f/u clinic appt with me... may need a workin appt.    HANK Bustos MD, MS  Endocrinology  [facility]

## 2019-11-04 ENCOUNTER — TELEPHONE (OUTPATIENT)
Dept: FAMILY MEDICINE | Facility: CLINIC | Age: 49
End: 2019-11-04

## 2019-11-04 NOTE — TELEPHONE ENCOUNTER
"----- Message from Vic Bustos MD sent at 10/31/2019  2:53 PM CDT -----  Regarding: Victoza PA  I received a fax from [facility], indicating that the Victoza 1.8 mg pen Rx for this patient is \"not covered for this dose\".   The fax requested we call [phone] to initiate a PA... patient ID [id number].    I don't understand how an insurance plan would cover a lower dose but not a higher dose of a medication.  The Victoza is medically necessary for his Type 2 diabetes treatment.  This higher dose has better efficacy for glucose reduction than the lower doses.    Please assist with a PA for the Victoza 1.8 mg daily dose.  Thanks.    HANK Bustos MD, MS  Endocrinology  [facility]          "

## 2019-11-04 NOTE — TELEPHONE ENCOUNTER
Prior Authorization Retail Medication Request    Medication/Dose: Victoza 18 mg/3 mL  ICD code (if different than what is on RX):  E11.3399, Z79.4  Previously Tried and Failed:  Semaglutide  Rationale:  Patient currently on lower dose of Victoza and is needing more adequate control of blood sugars.    Insurance Name:  [employer]  Insurance ID:  [id number]      Pharmacy Information (if different than what is on RX)  Name:  [id number]  Phone:  [phone]    CoverMyMeds key: [id number]

## 2019-11-06 NOTE — TELEPHONE ENCOUNTER
Prior Authorization Not Needed per Insurance    Medication: Victoza 18 mg/3 mL-PA NOT NEEDED  Insurance Company: [employer] ([facility]) - Phone [phone] Fax [phone]  Expected CoPay:      Pharmacy Filling the Rx: [id number] - [place], [place] - [address] AT [facility] [address]  Pharmacy Notified: Yes  Patient Notified: No    Pharmacy does not show that they requested a PA for Victoza, they asked for refills on Levemir.

## 2019-11-06 NOTE — TELEPHONE ENCOUNTER
Central Prior Authorization Team   Phone: [phone]      PA Initiation    Medication: Victoza 18 mg/3 mL-Initiated  Insurance Company: [employer] ([facility]) - Phone [phone] Fax [phone]  Pharmacy Filling the Rx: [id number] - [place], [place] - [address] AT [facility] [address]  Filling Pharmacy Phone: [phone]  Filling Pharmacy Fax:    Start Date: 11/6/2019

## 2019-11-07 ENCOUNTER — TELEPHONE (OUTPATIENT)
Dept: ENDOCRINOLOGY | Facility: CLINIC | Age: 49
End: 2019-11-07

## 2019-11-07 DIAGNOSIS — Z79.4 TYPE 2 DIABETES MELLITUS WITHOUT COMPLICATION, WITH LONG-TERM CURRENT USE OF INSULIN (H): ICD-10-CM

## 2019-11-07 DIAGNOSIS — E11.9 TYPE 2 DIABETES MELLITUS WITHOUT COMPLICATION, WITH LONG-TERM CURRENT USE OF INSULIN (H): ICD-10-CM

## 2019-11-07 RX ORDER — INSULIN DETEMIR 100 [IU]/ML
56-60 INJECTION, SOLUTION SUBCUTANEOUS 2 TIMES DAILY
Qty: 105 ML | Refills: 0 | Status: SHIPPED | OUTPATIENT
Start: 2019-11-07 | End: 2020-03-09

## 2019-11-07 NOTE — TELEPHONE ENCOUNTER
"Last Written Prescription Date:  7/03/19  Last Fill Quantity: 105 mL,  # refills: 0   Last office visit: 6/25/2019 with prescribing provider:  Akash   Future Office Visit:      Requested Prescriptions   Pending Prescriptions Disp Refills     LEVEMIR FLEXTOUCH 100 UNIT/ML pen 105 mL 0     Sig: Inject 56-60 Units Subcutaneous 2 times daily AS DIRECTED       Long Acting Insulin Protocol Passed - 11/7/2019  8:24 AM        Passed - Blood pressure less than 140/90 in past 6 months     BP Readings from Last 3 Encounters:   06/25/19 132/77   02/26/19 147/85   01/15/19 156/82                 Passed - LDL on file in past 12 months     Recent Labs   Lab Test 06/25/19  0932   LDL 57             Passed - Microalbumin on file in past 12 months     Recent Labs   Lab Test 10/09/18  1032   MICROL 2,970   UMALCR 2,076.92*             Passed - Serum creatinine on file in past 12 months     Recent Labs   Lab Test 06/25/19  0932   CR 1.46*             Passed - HgbA1C in past 3 or 6 months     If HgbA1C is 8 or greater, it needs to be on file within the past 3 months.  If less than 8, must be on file within the past 6 months.     Recent Labs   Lab Test 06/25/19  0932   A1C 7.4*             Passed - Medication is active on med list        Passed - Patient is age 18 or older        Passed - Recent (6 mo) or future (30 days) visit within the authorizing provider's specialty     Patient had office visit in the last 6 months or has a visit in the next 30 days with authorizing provider or within the authorizing provider's specialty.  See \"Patient Info\" tab in inbasket, or \"Choose Columns\" in Meds & Orders section of the refill encounter.              "

## 2019-11-07 NOTE — TELEPHONE ENCOUNTER
A 30 day supply is given, patient is due for an office visit.  Please call to  assist the patient in scheduling an appointment.  ANGIE Cuba, RN  [facility] T[facility]

## 2019-11-12 ENCOUNTER — TRANSFERRED RECORDS (OUTPATIENT)
Dept: HEALTH INFORMATION MANAGEMENT | Facility: CLINIC | Age: 49
End: 2019-11-12

## 2019-11-12 DIAGNOSIS — Z79.4 TYPE 2 DIABETES MELLITUS WITHOUT COMPLICATION, WITH LONG-TERM CURRENT USE OF INSULIN (H): ICD-10-CM

## 2019-11-12 DIAGNOSIS — E11.9 TYPE 2 DIABETES MELLITUS WITHOUT COMPLICATION, WITH LONG-TERM CURRENT USE OF INSULIN (H): ICD-10-CM

## 2019-11-12 RX ORDER — GLIMEPIRIDE 4 MG/1
8 TABLET ORAL
Qty: 60 TABLET | Refills: 0 | Status: SHIPPED | OUTPATIENT
Start: 2019-11-12 | End: 2019-12-13

## 2019-11-13 NOTE — TELEPHONE ENCOUNTER
Patient called back and was told by Dr Bustos to make an appt this week please call and try to set up this appt please  [phone].

## 2019-11-13 NOTE — TELEPHONE ENCOUNTER
Dr. Bustos,    Did you inform this pt to make an appt for this week?  If so, where can we schedule him?    Thank you,  Ronnie PRUITT RN

## 2019-11-15 NOTE — TELEPHONE ENCOUNTER
Message and webasite link received from patient.    HANK Bustos MD, MS  Endocrinology  [facility]

## 2019-11-15 NOTE — TELEPHONE ENCOUNTER
Message noted.  I do not recall asking patient to come in for an appointment soon... but I can work him in.  I called him and we decided on a followup appt 11/18/19 at 12:30pm, [facility].  He thanked us for the appt scheduling.    HANK Bustos MD, MS  Endocrinology  [facility]

## 2019-11-18 ENCOUNTER — OFFICE VISIT (OUTPATIENT)
Dept: ENDOCRINOLOGY | Facility: CLINIC | Age: 49
End: 2019-11-18
Payer: COMMERCIAL

## 2019-11-18 VITALS
SYSTOLIC BLOOD PRESSURE: 165 MMHG | BODY MASS INDEX: 34.27 KG/M2 | HEART RATE: 87 BPM | WEIGHT: 253 LBS | HEIGHT: 72 IN | DIASTOLIC BLOOD PRESSURE: 86 MMHG

## 2019-11-18 DIAGNOSIS — Z79.4 TYPE 2 DIABETES MELLITUS WITH DIABETIC NEPHROPATHY, WITH LONG-TERM CURRENT USE OF INSULIN (H): ICD-10-CM

## 2019-11-18 DIAGNOSIS — I10 ESSENTIAL HYPERTENSION: ICD-10-CM

## 2019-11-18 DIAGNOSIS — E11.40 TYPE 2 DIABETES MELLITUS WITH DIABETIC NEUROPATHY, WITH LONG-TERM CURRENT USE OF INSULIN (H): ICD-10-CM

## 2019-11-18 DIAGNOSIS — E11.21 TYPE 2 DIABETES MELLITUS WITH DIABETIC NEPHROPATHY, WITH LONG-TERM CURRENT USE OF INSULIN (H): ICD-10-CM

## 2019-11-18 DIAGNOSIS — Z79.4 TYPE 2 DIABETES MELLITUS WITH DIABETIC NEUROPATHY, WITH LONG-TERM CURRENT USE OF INSULIN (H): ICD-10-CM

## 2019-11-18 DIAGNOSIS — E11.3559 TYPE 2 DIABETES MELLITUS WITH STABLE PROLIFERATIVE RETINOPATHY, WITH LONG-TERM CURRENT USE OF INSULIN, UNSPECIFIED LATERALITY (H): Primary | ICD-10-CM

## 2019-11-18 DIAGNOSIS — Z79.4 TYPE 2 DIABETES MELLITUS WITH STABLE PROLIFERATIVE RETINOPATHY, WITH LONG-TERM CURRENT USE OF INSULIN, UNSPECIFIED LATERALITY (H): Primary | ICD-10-CM

## 2019-11-18 LAB — HBA1C MFR BLD: 7.4 % (ref 0–5.6)

## 2019-11-18 PROCEDURE — 83036 HEMOGLOBIN GLYCOSYLATED A1C: CPT | Performed by: INTERNAL MEDICINE

## 2019-11-18 PROCEDURE — 99214 OFFICE O/P EST MOD 30 MIN: CPT | Performed by: INTERNAL MEDICINE

## 2019-11-18 PROCEDURE — 80048 BASIC METABOLIC PNL TOTAL CA: CPT | Performed by: INTERNAL MEDICINE

## 2019-11-18 PROCEDURE — 95251 CONT GLUC MNTR ANALYSIS I&R: CPT | Performed by: INTERNAL MEDICINE

## 2019-11-18 PROCEDURE — 82043 UR ALBUMIN QUANTITATIVE: CPT | Performed by: INTERNAL MEDICINE

## 2019-11-18 PROCEDURE — 84460 ALANINE AMINO (ALT) (SGPT): CPT | Performed by: INTERNAL MEDICINE

## 2019-11-18 PROCEDURE — 36415 COLL VENOUS BLD VENIPUNCTURE: CPT | Performed by: INTERNAL MEDICINE

## 2019-11-18 ASSESSMENT — MIFFLIN-ST. JEOR: SCORE: 2040.6

## 2019-11-18 NOTE — PATIENT INSTRUCTIONS
Diabetes medication plan:   Levemir   50 units morning and 55 units evening   Humalog U200  35 units breakfast      20 units lunch      20 units supper     Humalog sscale  5U per 50>150    glimeparide 4 mg  2-tabs in evening    Separate the breakfasttime H injection and start of meal as 15-20 min.  Continue use of Armando continuous glucose sensor   Use [place] before each meal and also bedtime    Change BP meds as:   Losartan 100 mg 1-tablet daily   Amlodipine 5 mg as 2-tablets daily    Send list of the meds on new insurance plan by MyChart    Next appt with Dr. Bustos in 2/2020

## 2019-11-18 NOTE — PROGRESS NOTES
Name: Braden De Leon    Chief Complaint   Patient presents with     Diabetes     HPI:  Recent issues:  Here for f/u diabetes evaluation  Feeling pretty well but stressors with finances, wife's job and her hysterectomy surgery, insomnia        1997. Diagnosis of T2DM  Initial treatment with Glucophage x several months, but developed GI symptoms and discontinued  Changed to Glucotrol, then addition of Avandia  3/2005. Saw Dr. СЕРГЕЙ Luevano/Endocrinology  Previous hgbA1c's 8.4-9.1% range  Subsequent addition of basal insulin as QD... Then BID dosing routine  Has used Lantus, Levemir, then Tresiba (QD) and then Levemir.  Tresiba non formulary  5/2019. Stopped Victoza, due to higher expense    Current DM meds:   Levemir   50 units morning and 60 units evening   Humalog U200  40 breakfast      20 lunch      20 supper     Humalog sscale  5U per 50>150    glimeparide 4 mg  2-tabs in evening    Using Accucheck ? Meter, uses occasionally  12/2017 Began use of Freestyle Armando Personal CGMS device, likes it  Recent LibrePersonal CGMS data:      Previous [facility] labs include:  Lab Results   Component Value Date    A1C 7.4 (H) 11/18/2019     06/25/2019    POTASSIUM 4.2 06/25/2019    CHLORIDE 110 (H) 06/25/2019    CO2 22 06/25/2019    ANIONGAP 10 06/25/2019     (H) 06/25/2019    BUN 25 06/25/2019    CR 1.46 (H) 06/25/2019    GFRESTIMATED 55 (L) 06/25/2019    GFRESTBLACK 64 06/25/2019    SILVIANO 9.2 06/25/2019    CHOL 133 06/25/2019    TRIG 162 (H) 06/25/2019    HDL 44 06/25/2019    LDL 57 06/25/2019    NHDL 89 06/25/2019    UCRR 143 10/09/2018    MICROL 2,970 10/09/2018    UMALCR 2,076.92 (H) 10/09/2018     DM Complications:   Retinopathy    Previous laser PC? and Avastin eye injections OU    Sees Dr. BRIE Zuñiga/[facility]   Nephropathy    Microalbuminuria    Taking losartan daily (and lisinopril discontinued Fall '19)   Neuropathy    Decreased sensation vs numbness at feet      , wife Lela.  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 MAI Spear/ [facility] for gen med evaluations    PMH/PSH:  Past Medical History:   Diagnosis Date     Atypical nevus 2019    Dr Wright     Benign neoplasm of colon ,     adenomatous polyps - due 5 yrs     Cholelithiasis      Diabetic macular edema (H)     bilateral - avastatin >10 each - Dr CANDELARIA Zuñiga     ED (erectile dysfunction)      Esophageal reflux      Esophageal stricture      Esophagitis     LA Grade A     Hyperlipidemia LDL goal < 70      Hypertension goal BP (blood pressure) < 140/90 3/09     Migraine     rare migraines 2/yr     Nephrolithiasis 10/2018    4 mm - urology     Obesity (BMI 30.0-34.9)      Other premature beats     bigeminy     Proliferative diabetic retinopathy (H) 2017    OU - Avastatin - Dr Rosalva Zuñiga     Type 2 diabetes mellitus with complications (H)     neuropathy, retinopathy, nephropathy     Unspecified gastritis and gastroduodenitis without mention of hemorrhage      Past Surgical History:   Procedure Laterality Date     COLONOSCOPY  ,     polyps x 2 - internal hemorrhoids - adenomatous polyps - due every 5 yrs     COLONOSCOPY N/A 2017    diverticula x 1 - due 5 yrs     ESOPHAGOSCOPY, GASTROSCOPY, DUODENOSCOPY (EGD), COMBINED  2013    Esophagitis LA Grade A, esophageal stricture     HC REPAIR ING HERNIA,5+Y/O,REDUCIBL      rt     STRESS ECHO (METRO)  3/12    normal       Family Hx:  Family History   Problem Relation Age of Onset     Hypertension Mother 58     Connective Tissue Disorder Father 70     Diabetes Father 55     Breast Cancer Maternal Grandmother      Chronic Obstructive Pulmonary Disease Paternal Grandfather      Other - See Comments Maternal Grandfather          in WWII     Other - See Comments Paternal Grandmother         Train     Other - See Comments Brother         MVA     Kidney Cancer Brother      Colon Cancer No family hx of          Social  Hx:  Social History     Socioeconomic History     Marital status:      Spouse name: Lela     Number of children: 1     Years of education: 13     Highest education level: Not on file   Occupational History     Occupation: manage restuarant     Employer: [employer]   Social Needs     Financial resource strain: Not on file     Food insecurity:     Worry: Not on file     Inability: Not on file     Transportation needs:     Medical: Not on file     Non-medical: Not on file   Tobacco Use     Smoking status: Never Smoker     Smokeless tobacco: Never Used   Substance and Sexual Activity     Alcohol use: No     Drug use: No     Sexual activity: Yes     Partners: Female   Lifestyle     Physical activity:     Days per week: Not on file     Minutes per session: Not on file     Stress: Not on file   Relationships     Social connections:     Talks on phone: Not on file     Gets together: Not on file     Attends Yazidi service: Not on file     Active member of club or organization: Not on file     Attends meetings of clubs or organizations: Not on file     Relationship status: Not on file     Intimate partner violence:     Fear of current or ex partner: Not on file     Emotionally abused: Not on file     Physically abused: Not on file     Forced sexual activity: Not on file   Other Topics Concern      Service Yes     Comment: [employer]     Blood Transfusions Not Asked     Caffeine Concern Yes     Comment: 24 oz diet     Occupational Exposure Not Asked     Hobby Hazards Not Asked     Sleep Concern Not Asked     Stress Concern Not Asked     Weight Concern Not Asked     Special Diet Not Asked     Back Care Not Asked     Exercise Yes     Comment: 3-5/wk, Ref's     Bike Helmet Not Asked     Seat Belt Yes     Self-Exams Not Asked     Parent/sibling w/ CABG, MI or angioplasty before 65F 55M? No   Social History Narrative     Not on file          MEDICATIONS:  has a current medication list which includes the  following prescription(s): amlodipine, aspirin, glimepiride, insulin lispro, levemir flextouch, losartan, naproxen sodium, omeprazole, rosuvastatin, sertraline, sildenafil, blood glucose monitoring, continuous blood glucose monitoring, novofine plus, and victoza pen.    ROS:     ROS: 10 point ROS neg other than the symptoms noted above in the HPI.    GENERAL: mild fatigue; no weight changes, fevers, chills, night sweats.   HEENT: reports good vision acuity (but not today w/ eye patches), no dysphagia, odonophagia, diplopia  THYROID:  no apparent hyper or hypothyroid symptoms  CV: no chest pain, pressure, palpitations, skipped beats  LUNGS: no SOB, LEAVITT, cough, wheezing   ABDOMEN: no diarrhea, constipation, abdominal pain  EXTREMITIES: no rashes, ulcers, edema  NEUROLOGY: decreased sensation at feet at nighttime; no headaches, denies changes in vision,   MSK: some arthralgias at knees, ankles, back; no apparent weakness  SKIN: no rashes or lesions  PSYCH:  stable mood, no significant anxiety or depression  ENDOCRINE: no heat or cold intolerance    Physical Exam   VS: BP (!) 165/86   Pulse 87   Ht 1.829 m (6')   Wt 114.8 kg (253 lb)   BMI 34.31 kg/m    GENERAL: AXOX3, NAD, well dressed, answering questions appropriately, appears stated age.  HEENT:  Mild scleral area hemorrhage left eye; EOMI  THYROID:  normal gland, no apparent nodules or goiter  ABDOMEN: obese, soft, nontender, nondistended, no organomegaly  MSK: grossly intact  SKIN: scalp balding; no rashes, no lesions    LABS:    All pertinent notes, labs, and images personally reviewed by me.     A/P:  Encounter Diagnoses   Name Primary?     Type 2 diabetes mellitus with stable proliferative retinopathy, with long-term current use of insulin, unspecified laterality (H) Yes     Type 2 diabetes mellitus with diabetic nephropathy, with long-term current use of insulin (H)      Type 2 diabetes mellitus with diabetic neuropathy, with long-term current use of  insulin (H)      Essential hypertension        Comments:  Reviewed health history and diabetes issues.    Plan:  Discussed general issues with the diabetes mellitus diagnosis and management  Reviewed the [id number] test which reflects previous overall glucose levels or control  Discussed the importance of blood glucose (BG) vs CGMS sensor glucose testing to assess glucose trends  Provided general overview of diabetes (oral and injectable) medication use  Reviewed recent LibrePersonal CGMS glucose trend data, in detail.    Recommend:  Continue current Levemir, Humalog, and glimeparide med plan, as note  Check labs today  Blood pressure high today, needs more aggressive management   Advised raising amlodipine as 10 mg every day, continue losartan med use   Needs close monitoring of BP, blood Cr and BUN, urine microalbumin ratio   Consider nephrology consultation  Keep focus on diet, exercise, weight management  Will review diabetes medication formulary options when he changes insurance plans 1/2020 or Rx's needed  Advise having fasting lipid panel testing and dilated eye examination, at least annually  Keep regular followup ophthalmology evaluations also    Addressed patient questions today    Patient Instructions   Diabetes medication plan:   Levemir   50 units morning and 55 units evening   Humalog U200  35 units breakfast      20 units lunch      20 units supper     Humalog sscale  5U per 50>150    glimeparide 4 mg  2-tabs in evening    Separate the breakfasttime H injection and start of meal as 15-20 min.  Continue use of Armando continuous glucose sensor   Use [place] before each meal and also bedtime    Change BP meds as:   Losartan 100 mg 1-tablet daily   Amlodipine 5 mg as 2-tablets daily    Send list of the meds on new insurance plan by MyChart    Next appt with Dr. Bustos in 2/2020      Labs ordered today:   Orders Placed This Encounter   Procedures     GLUCOSE MONITOR, 72 HOUR, PHYS INTERP     Hemoglobin  A1c     Basic metabolic panel     Albumin Random Urine Quantitative with Creat Ratio     ALT     Radiology/Consults ordered today: None    More than 50% of the time spent with Mr. De Leon on counseling / coordinating his care.  Total appointment time was 35 minutes.    Follow-up:  3 mo.    HANK Bustos MD, MS  Endocrinology  [facility]    CC:  MAI Spear

## 2019-11-19 LAB
ALT SERPL W P-5'-P-CCNC: 24 U/L (ref 0–70)
ANION GAP SERPL CALCULATED.3IONS-SCNC: 8 MMOL/L (ref 3–14)
BUN SERPL-MCNC: 26 MG/DL (ref 7–30)
CALCIUM SERPL-MCNC: 9.2 MG/DL (ref 8.5–10.1)
CHLORIDE SERPL-SCNC: 106 MMOL/L (ref 94–109)
CO2 SERPL-SCNC: 25 MMOL/L (ref 20–32)
CREAT SERPL-MCNC: 1.44 MG/DL (ref 0.66–1.25)
CREAT UR-MCNC: 202 MG/DL
GFR SERPL CREATININE-BSD FRML MDRD: 56 ML/MIN/{1.73_M2}
GLUCOSE SERPL-MCNC: 344 MG/DL (ref 70–99)
MICROALBUMIN UR-MCNC: >6800 MG/L
MICROALBUMIN/CREAT UR: 3366.34 MG/G CR (ref 0–17)
POTASSIUM SERPL-SCNC: 3.4 MMOL/L (ref 3.4–5.3)
SODIUM SERPL-SCNC: 139 MMOL/L (ref 133–144)

## 2019-11-21 ENCOUNTER — MYC MEDICAL ADVICE (OUTPATIENT)
Dept: ENDOCRINOLOGY | Facility: CLINIC | Age: 49
End: 2019-11-21

## 2019-11-22 NOTE — TELEPHONE ENCOUNTER
Dr. Bustos-     Please see message below.     Please advise on how Triage can assist,     Thank you,   Olivia KANG RN

## 2019-12-13 ENCOUNTER — TELEPHONE (OUTPATIENT)
Dept: FAMILY MEDICINE | Facility: CLINIC | Age: 49
End: 2019-12-13

## 2019-12-13 NOTE — TELEPHONE ENCOUNTER
Patient returned call, advised of message and states he will get BP check done at a [facility] clinic closer to home.     Cesar WEST  Patient Representative - Prior [place]

## 2019-12-13 NOTE — TELEPHONE ENCOUNTER
Needs of attention regarding:  -High Blood Pressure    Health Maintenance Topics with due status: Overdue       Topic Date Due    PNEUMOCOCCAL IMMUNIZATION 19-64 MEDIUM RISK 03/18/1987    DTAP/TDAP/TD IMMUNIZATION 04/01/2011    ZOSTER IMMUNIZATION 03/18/2018    FIT 05/23/2018    PSA 10/09/2019    DIABETIC FOOT EXAM 11/30/2019     Health Maintenance Topics with due status: Due On       Topic Date Due    PREVENTIVE CARE VISIT 11/30/2019       Communication:  Patient called - left detailed message to call back and schedule RN BP check before the end of the year

## 2019-12-16 DIAGNOSIS — I10 HYPERTENSION GOAL BP (BLOOD PRESSURE) < 140/90: ICD-10-CM

## 2019-12-16 DIAGNOSIS — Z79.4 TYPE 2 DIABETES MELLITUS WITH DIABETIC NEPHROPATHY, WITH LONG-TERM CURRENT USE OF INSULIN (H): ICD-10-CM

## 2019-12-16 DIAGNOSIS — N18.30 CKD (CHRONIC KIDNEY DISEASE) STAGE 3, GFR 30-59 ML/MIN (H): ICD-10-CM

## 2019-12-16 DIAGNOSIS — E11.3559 TYPE 2 DIABETES MELLITUS WITH STABLE PROLIFERATIVE RETINOPATHY, WITH LONG-TERM CURRENT USE OF INSULIN, UNSPECIFIED LATERALITY (H): ICD-10-CM

## 2019-12-16 DIAGNOSIS — Z79.4 TYPE 2 DIABETES MELLITUS WITH STABLE PROLIFERATIVE RETINOPATHY, WITH LONG-TERM CURRENT USE OF INSULIN, UNSPECIFIED LATERALITY (H): ICD-10-CM

## 2019-12-16 DIAGNOSIS — E11.40 TYPE 2 DIABETES MELLITUS WITH DIABETIC NEUROPATHY, WITH LONG-TERM CURRENT USE OF INSULIN (H): ICD-10-CM

## 2019-12-16 DIAGNOSIS — Z79.4 TYPE 2 DIABETES MELLITUS WITH DIABETIC NEUROPATHY, WITH LONG-TERM CURRENT USE OF INSULIN (H): ICD-10-CM

## 2019-12-16 DIAGNOSIS — E78.5 HYPERLIPIDEMIA WITH TARGET LDL LESS THAN 70: ICD-10-CM

## 2019-12-16 DIAGNOSIS — E11.21 TYPE 2 DIABETES MELLITUS WITH DIABETIC NEPHROPATHY, WITH LONG-TERM CURRENT USE OF INSULIN (H): ICD-10-CM

## 2019-12-17 NOTE — TELEPHONE ENCOUNTER
"Requested Prescriptions   Pending Prescriptions Disp Refills     losartan (COZAAR) 100 MG tablet [Pharmacy Med Name: LOSARTAN 100MG TABLETS]        Last Written Prescription Date:  11.30.18  Last Fill Quantity: 90 tablet,  # refills: 3   Last office visit: 1/15/2019 with prescribing provider:  Pepe Spear MD             Future Office Visit:       90 tablet 0     Sig: TAKE 1 TABLET(100 MG) BY MOUTH DAILY       Angiotensin-II Receptors Failed - 12/16/2019  6:00 PM        Failed - Last blood pressure under 140/90 in past 12 months     BP Readings from Last 3 Encounters:   11/18/19 (!) 165/86   06/25/19 132/77   02/26/19 147/85                 Failed - Normal serum creatinine on file in past 12 months     Recent Labs   Lab Test 11/18/19  1307   CR 1.44*             Passed - Recent (12 mo) or future (30 days) visit within the authorizing provider's specialty     Patient has had an office visit with the authorizing provider or a provider within the authorizing providers department within the previous 12 mos or has a future within next 30 days. See \"Patient Info\" tab in inbasket, or \"Choose Columns\" in Meds & Orders section of the refill encounter.              Passed - Medication is active on med list        Passed - Patient is age 18 or older        Passed - Normal serum potassium on file in past 12 months     Recent Labs   Lab Test 11/18/19  1307   POTASSIUM 3.4                    rosuvastatin (CRESTOR) 20 MG tablet [Pharmacy Med Name: ROSUVASTATIN 20MG TABLETS]            Last Written Prescription Date:  11.30.18  Last Fill Quantity: 90 tablet,  # refills: 3   Last office visit: 1/15/2019 with prescribing provider:  Pepe Spear MD               Future Office Visit:       90 tablet 0     Sig: TAKE 1 TABLET(20 MG) BY MOUTH DAILY       Statins Protocol Passed - 12/16/2019  6:00 PM        Passed - LDL on file in past 12 months     Recent Labs   Lab Test 06/25/19  0932   LDL 57             Passed - No abnormal creatine " "kinase in past 12 months     Recent Labs   Lab Test 10/09/18  1031                   Passed - Recent (12 mo) or future (30 days) visit within the authorizing provider's specialty     Patient has had an office visit with the authorizing provider or a provider within the authorizing providers department within the previous 12 mos or has a future within next 30 days. See \"Patient Info\" tab in inbasket, or \"Choose Columns\" in Meds & Orders section of the refill encounter.              Passed - Medication is active on med list        Passed - Patient is age 18 or older        "

## 2019-12-18 RX ORDER — LOSARTAN POTASSIUM 100 MG/1
TABLET ORAL
Qty: 90 TABLET | Refills: 0 | Status: SHIPPED | OUTPATIENT
Start: 2019-12-18 | End: 2020-03-10

## 2019-12-18 RX ORDER — ROSUVASTATIN CALCIUM 20 MG/1
TABLET, COATED ORAL
Qty: 90 TABLET | Refills: 0 | Status: SHIPPED | OUTPATIENT
Start: 2019-12-18 | End: 2020-03-10

## 2019-12-18 NOTE — TELEPHONE ENCOUNTER
rx done, advise bp recheck before the end of the year, due for cpx fasting, note CKD stage 3    BP Readings from Last 3 Encounters:   11/18/19 (!) 165/86   06/25/19 132/77   02/26/19 147/85     Creatinine   Date Value Ref Range Status   11/18/2019 1.44 (H) 0.66 - 1.25 mg/dL Final     GFR Estimate   Date Value Ref Range Status   11/18/2019 56 (L) >60 mL/min/[1.73_m2] Final     Comment:     Non  GFR Calc  Starting 12/18/2018, serum creatinine based estimated GFR (eGFR) will be   calculated using the Chronic Kidney Disease Epidemiology Collaboration   (CKD-EPI) equation.

## 2020-01-12 DIAGNOSIS — R45.86 MOOD CHANGES: ICD-10-CM

## 2020-01-12 NOTE — LETTER
[address] [address]                                                                                                       [phone]    January 21, 2020    Braden De Leon  [address] [facility] [address]      Braden,    We have been calling you regarding a recent refill request we received for Zoloft.  Unfortunately, we were unable to reach you.  We are notifying you that you are due for your annual fasting physical prior to your next refill.  You can schedule this appointment via [employer] or by calling the clinic at [phone].          Sincerely,        Pepe Spear M.D.

## 2020-01-13 NOTE — TELEPHONE ENCOUNTER
"Requested Prescriptions   Pending Prescriptions Disp Refills     sertraline (ZOLOFT) 100 MG tablet [Pharmacy Med Name: SERTRALINE 100MG TABLETS]        Last Written Prescription Date:  10.1.19  Last Fill Quantity: 180 tablet,  # refills: 0   Last office visit: 1/15/2019 with prescribing provider:  Pepe Spear MD           Future Office Visit:       180 tablet 0     Sig: TAKE 2 TABLETS BY MOUTH DAILY       SSRIs Protocol Passed - 1/12/2020  5:41 AM        Passed - Recent (12 mo) or future (30 days) visit within the authorizing provider's specialty     Patient has had an office visit with the authorizing provider or a provider within the authorizing providers department within the previous 12 mos or has a future within next 30 days. See \"Patient Info\" tab in inbasket, or \"Choose Columns\" in Meds & Orders section of the refill encounter.              Passed - Medication is active on med list        Passed - Patient is age 18 or older        "

## 2020-01-14 RX ORDER — SERTRALINE HYDROCHLORIDE 100 MG/1
TABLET, FILM COATED ORAL
Qty: 180 TABLET | Refills: 0 | Status: SHIPPED | OUTPATIENT
Start: 2020-01-14 | End: 2020-04-04

## 2020-01-15 NOTE — TELEPHONE ENCOUNTER
Left non-detailed message for patient to call back.  Please schedule follow up when patient calls back.  (see previous notes for details)    Thanks Jolanta

## 2020-02-06 DIAGNOSIS — E11.9 TYPE 2 DIABETES MELLITUS WITHOUT COMPLICATION, WITH LONG-TERM CURRENT USE OF INSULIN (H): ICD-10-CM

## 2020-02-06 DIAGNOSIS — K21.9 GASTROESOPHAGEAL REFLUX DISEASE WITHOUT ESOPHAGITIS: ICD-10-CM

## 2020-02-06 DIAGNOSIS — Z79.4 TYPE 2 DIABETES MELLITUS WITHOUT COMPLICATION, WITH LONG-TERM CURRENT USE OF INSULIN (H): ICD-10-CM

## 2020-02-06 RX ORDER — OMEPRAZOLE 40 MG/1
CAPSULE, DELAYED RELEASE ORAL
Qty: 30 CAPSULE | Refills: 0 | Status: SHIPPED | OUTPATIENT
Start: 2020-02-06 | End: 2020-03-10

## 2020-02-06 RX ORDER — GLIMEPIRIDE 4 MG/1
TABLET ORAL
Qty: 60 TABLET | Refills: 1 | OUTPATIENT
Start: 2020-02-06

## 2020-02-06 NOTE — TELEPHONE ENCOUNTER
"Last Written Prescription Date:  1/25/19  Last Fill Quantity: 30 capsules,  # refills: 11   Last office visit: 1/15/2019 with prescribing provider:  Rainer   Future Office Visit:      Requested Prescriptions   Pending Prescriptions Disp Refills     omeprazole (PRILOSEC) 40 MG DR capsule [Pharmacy Med Name: OMEPRAZOLE 40MG CAPSULES] 30 capsule 11     Sig: TAKE ONE CAPSULE BY MOUTH EVERY DAY.       PPI Protocol Failed - 2/6/2020  5:41 AM        Failed - Recent (12 mo) or future (30 days) visit within the authorizing provider's specialty     Patient has had an office visit with the authorizing provider or a provider within the authorizing providers department within the previous 12 mos or has a future within next 30 days. See \"Patient Info\" tab in inbasket, or \"Choose Columns\" in Meds & Orders section of the refill encounter.              Passed - Not on Clopidogrel (unless Pantoprazole ordered)        Passed - No diagnosis of osteoporosis on record        Passed - Medication is active on med list        Passed - Patient is age 18 or older          "

## 2020-02-06 NOTE — TELEPHONE ENCOUNTER
"Last Written Prescription Date:  12/17/19  Last Fill Quantity: 60 tablet,  # refills: 1   Last office visit: 11/18/2019 with prescribing provider:  Akash   Future Office Visit:      Requested Prescriptions   Pending Prescriptions Disp Refills     glimepiride (AMARYL) 4 MG tablet [Pharmacy Med Name: GLIMEPIRIDE 4MG TABLETS] 60 tablet 1     Sig: TAKE 2 TABLET BY MOUTH EVERY MORNING(BEFORE BREAKFAST)       Sulfonylurea Agents Failed - 2/6/2020  5:41 AM        Failed - Blood pressure less than 140/90 in past 6 months     BP Readings from Last 3 Encounters:   11/18/19 (!) 165/86   06/25/19 132/77   02/26/19 147/85                 Failed - Patient has a recent creatinine (normal) within the past 12 mos.     Recent Labs   Lab Test 11/18/19  1307   CR 1.44*             Passed - Patient has documented LDL within the past 12 mos.     Recent Labs   Lab Test 06/25/19  0932   LDL 57             Passed - Patient has had a Microalbumin in the past 15 mos.     Recent Labs   Lab Test 11/18/19  1312   MICROL >6,800   UMALCR 3,366.34*             Passed - Patient has documented A1c within the specified period of time.     If HgbA1C is 8 or greater, it needs to be on file within the past 3 months.  If less than 8, must be on file within the past 6 months.     Recent Labs   Lab Test 11/18/19  1307   A1C 7.4*             Passed - Medication is active on med list        Passed - Patient is age 18 or older        Passed - Recent (6 mo) or future (30 days) visit within the authorizing provider's specialty     Patient had office visit in the last 6 months or has a visit in the next 30 days with authorizing provider or within the authorizing provider's specialty.  See \"Patient Info\" tab in inbasket, or \"Choose Columns\" in Meds & Orders section of the refill encounter.              "

## 2020-02-06 NOTE — TELEPHONE ENCOUNTER
No future appt scheduled  Medication is being filled for 1 time refill only due to:  Patient needs to be seen because it has been more than one year since last visit.    Rosalind Lu RN  [facility]

## 2020-02-16 DIAGNOSIS — E11.3559 TYPE 2 DIABETES MELLITUS WITH STABLE PROLIFERATIVE RETINOPATHY, WITH LONG-TERM CURRENT USE OF INSULIN, UNSPECIFIED LATERALITY (H): ICD-10-CM

## 2020-02-16 DIAGNOSIS — Z79.4 TYPE 2 DIABETES MELLITUS WITH STABLE PROLIFERATIVE RETINOPATHY, WITH LONG-TERM CURRENT USE OF INSULIN, UNSPECIFIED LATERALITY (H): ICD-10-CM

## 2020-02-17 NOTE — TELEPHONE ENCOUNTER
"Requested Prescriptions   Pending Prescriptions Disp Refills     NOVOFINE PLUS 32G X 4 MM miscellaneous [Pharmacy Med Name: NOVOFINE PLS [id number] PEN NEEDLES]       Sig: USE AS DIRECTED  Last Written Prescription Date:  6/26/18  Last Fill Quantity: 300 each,  # refills: 3   Last office visit: 1/15/2019 with prescribing provider:  Rainer   Future Office Visit:         Diabetic Supplies Protocol Passed - 2/16/2020 11:52 AM        Passed - Medication is active on med list        Passed - Patient is 18 years of age or older        Passed - Recent (6 mo) or future (30 days) visit within the authorizing provider's specialty     Patient had office visit in the last 6 months or has a visit in the next 30 days with authorizing provider.  See \"Patient Info\" tab in inbasket, or \"Choose Columns\" in Meds & Orders section of the refill encounter.             "

## 2020-02-18 NOTE — TELEPHONE ENCOUNTER
Prescription approved per [facility] [place] [place] Refill Protocol.    Ronnie PRUITT RN

## 2020-02-24 ENCOUNTER — TRANSFERRED RECORDS (OUTPATIENT)
Dept: HEALTH INFORMATION MANAGEMENT | Facility: CLINIC | Age: 50
End: 2020-02-24

## 2020-03-07 DIAGNOSIS — Z79.4 TYPE 2 DIABETES MELLITUS WITH STABLE PROLIFERATIVE RETINOPATHY, WITH LONG-TERM CURRENT USE OF INSULIN, UNSPECIFIED LATERALITY (H): ICD-10-CM

## 2020-03-07 DIAGNOSIS — Z79.4 TYPE 2 DIABETES MELLITUS WITH DIABETIC NEUROPATHY, WITH LONG-TERM CURRENT USE OF INSULIN (H): ICD-10-CM

## 2020-03-07 DIAGNOSIS — E11.40 TYPE 2 DIABETES MELLITUS WITH DIABETIC NEUROPATHY, WITH LONG-TERM CURRENT USE OF INSULIN (H): ICD-10-CM

## 2020-03-07 DIAGNOSIS — E11.21 TYPE 2 DIABETES MELLITUS WITH DIABETIC NEPHROPATHY, WITH LONG-TERM CURRENT USE OF INSULIN (H): ICD-10-CM

## 2020-03-07 DIAGNOSIS — I10 HYPERTENSION GOAL BP (BLOOD PRESSURE) < 140/90: ICD-10-CM

## 2020-03-07 DIAGNOSIS — E78.5 HYPERLIPIDEMIA WITH TARGET LDL LESS THAN 70: ICD-10-CM

## 2020-03-07 DIAGNOSIS — K21.9 GASTROESOPHAGEAL REFLUX DISEASE WITHOUT ESOPHAGITIS: ICD-10-CM

## 2020-03-07 DIAGNOSIS — E11.3559 TYPE 2 DIABETES MELLITUS WITH STABLE PROLIFERATIVE RETINOPATHY, WITH LONG-TERM CURRENT USE OF INSULIN, UNSPECIFIED LATERALITY (H): ICD-10-CM

## 2020-03-07 DIAGNOSIS — Z79.4 TYPE 2 DIABETES MELLITUS WITH DIABETIC NEPHROPATHY, WITH LONG-TERM CURRENT USE OF INSULIN (H): ICD-10-CM

## 2020-03-07 NOTE — TELEPHONE ENCOUNTER
"Requested Prescriptions   Pending Prescriptions Disp Refills     rosuvastatin (CRESTOR) 20 MG tablet [Pharmacy Med Name: ROSUVASTATIN  Last Written Prescription Date:  12/18/19  Last Fill Quantity: 90,  # refills: 0   Last Office Visit: 1/15/2019   Future Office Visit:      20MG TABLETS] 90 tablet 0     Sig: TAKE 1 TABLET(20 MG) BY MOUTH DAILY       Statins Protocol Failed - 3/7/2020  5:40 AM        Failed - Recent (12 mo) or future (30 days) visit within the authorizing provider's specialty     Patient has had an office visit with the authorizing provider or a provider within the authorizing providers department within the previous 12 mos or has a future within next 30 days. See \"Patient Info\" tab in inbasket, or \"Choose Columns\" in Meds & Orders section of the refill encounter.              Passed - LDL on file in past 12 months     Recent Labs   Lab Test 06/25/19  0932   LDL 57           Passed - No abnormal creatine kinase in past 12 months     Recent Labs   Lab Test 10/09/18  1031               Passed - Medication is active on med list        Passed - Patient is age 18 or older        omeprazole (PRILOSEC) 40 MG DR capsule [Pharmacy Med Name:  Last Written Prescription Date:  2/6/20  Last Fill Quantity: 30,  # refills: 0   Last Office Visit: 1/15/2019   Future Office Visit:      OMEPRAZOLE 40MG CAPSULES] 30 capsule 0     Sig: TAKE 1 CAPSULE BY MOUTH EVERY DAY       PPI Protocol Failed - 3/7/2020  5:40 AM        Failed - Recent (12 mo) or future (30 days) visit within the authorizing provider's specialty     Patient has had an office visit with the authorizing provider or a provider within the authorizing providers department within the previous 12 mos or has a future within next 30 days. See \"Patient Info\" tab in inbasket, or \"Choose Columns\" in Meds & Orders section of the refill encounter.              Passed - Not on Clopidogrel (unless Pantoprazole ordered)        Passed - No diagnosis of " "osteoporosis on record        Passed - Medication is active on med list        Passed - Patient is age 18 or older        losartan (COZAAR) 100 MG tablet [Pharmacy Med Name: LOSARTAN 100MG  Last Written Prescription Date:  12/18/19  Last Fill Quantity: 90,  # refills: 0   Last Office Visit: 1/15/2019   Future Office Visit:      TABLETS] 90 tablet 0     Sig: TAKE 1 TABLET(100 MG) BY MOUTH DAILY       Angiotensin-II Receptors Failed - 3/7/2020  5:40 AM        Failed - Last blood pressure under 140/90 in past 12 months     BP Readings from Last 3 Encounters:   11/18/19 (!) 165/86   06/25/19 132/77   02/26/19 147/85           Failed - Recent (12 mo) or future (30 days) visit within the authorizing provider's specialty     Patient has had an office visit with the authorizing provider or a provider within the authorizing providers department within the previous 12 mos or has a future within next 30 days. See \"Patient Info\" tab in inbasket, or \"Choose Columns\" in Meds & Orders section of the refill encounter.            Failed - Normal serum creatinine on file in past 12 months     Recent Labs   Lab Test 11/18/19  1307   CR 1.44*             Passed - Medication is active on med list        Passed - Patient is age 18 or older        Passed - Normal serum potassium on file in past 12 months     Recent Labs   Lab Test 11/18/19  1307   POTASSIUM 3.4                      "

## 2020-03-10 RX ORDER — LOSARTAN POTASSIUM 100 MG/1
TABLET ORAL
Qty: 30 TABLET | Refills: 0 | Status: SHIPPED | OUTPATIENT
Start: 2020-03-10 | End: 2020-04-04

## 2020-03-10 RX ORDER — ROSUVASTATIN CALCIUM 20 MG/1
TABLET, COATED ORAL
Qty: 30 TABLET | Refills: 0 | Status: SHIPPED | OUTPATIENT
Start: 2020-03-10 | End: 2020-04-04

## 2020-03-10 RX ORDER — OMEPRAZOLE 40 MG/1
CAPSULE, DELAYED RELEASE ORAL
Qty: 30 CAPSULE | Refills: 0 | Status: SHIPPED | OUTPATIENT
Start: 2020-03-10 | End: 2020-04-04

## 2020-03-10 NOTE — TELEPHONE ENCOUNTER
Patient due for fasting office visit- 30 days supply given.  Routing to team to schedule appointment     Jolanta LAU RN  [facility]  [phone]

## 2020-03-11 NOTE — TELEPHONE ENCOUNTER
Appt made with Dr Spear for Saturday 4/11    Patient has to do a Saturday due to work schedule       Jolanta Fisher

## 2020-03-15 ENCOUNTER — HEALTH MAINTENANCE LETTER (OUTPATIENT)
Age: 50
End: 2020-03-15

## 2020-03-17 DIAGNOSIS — I10 BENIGN ESSENTIAL HYPERTENSION: ICD-10-CM

## 2020-03-18 RX ORDER — AMLODIPINE BESYLATE 5 MG/1
5 TABLET ORAL DAILY
Qty: 90 TABLET | Refills: 0 | Status: SHIPPED | OUTPATIENT
Start: 2020-03-18 | End: 2020-05-26

## 2020-03-18 NOTE — TELEPHONE ENCOUNTER
"amLODIPine (NORVASC) 5 MG tablet  90 tablet  3  2/26/2019 2/26/2020      Last Written Prescription Date:  2/26/2019  Last Fill Quantity: 90,  # refills: 3   Last office visit: 11/18/2019 with prescribing provider: JUAN Bustos    Future Office Visit:   Next 5 appointments (look out 90 days)    Apr 11, 2020  8:40 AM CDT  PHYSICAL with Pepe Spear MD  [facility] ([facility] [address] [place] [phone] [phone]         Requested Prescriptions   Pending Prescriptions Disp Refills     amLODIPine (NORVASC) 5 MG tablet 90 tablet 3     Sig: Take 1 tablet (5 mg) by mouth daily       Calcium Channel Blockers Protocol  Failed - 3/17/2020  7:54 PM        Failed - Blood pressure under 140/90 in past 12 months     BP Readings from Last 3 Encounters:   11/18/19 (!) 165/86   06/25/19 132/77   02/26/19 147/85                 Failed - Normal serum creatinine on file in past 12 months     Recent Labs   Lab Test 11/18/19  1307   CR 1.44*       Ok to refill medication if creatinine is low          Passed - Recent (12 mo) or future (30 days) visit within the authorizing provider's specialty     Patient has had an office visit with the authorizing provider or a provider within the authorizing providers department within the previous 12 mos or has a future within next 30 days. See \"Patient Info\" tab in inbasket, or \"Choose Columns\" in Meds & Orders section of the refill encounter.              Passed - Medication is active on med list        Passed - Patient is age 18 or older             "

## 2020-03-21 ENCOUNTER — OFFICE VISIT (OUTPATIENT)
Dept: URGENT CARE | Facility: URGENT CARE | Age: 50
End: 2020-03-21
Payer: COMMERCIAL

## 2020-03-21 VITALS
SYSTOLIC BLOOD PRESSURE: 140 MMHG | TEMPERATURE: 96.8 F | HEART RATE: 80 BPM | BODY MASS INDEX: 35.13 KG/M2 | OXYGEN SATURATION: 100 % | WEIGHT: 259 LBS | DIASTOLIC BLOOD PRESSURE: 72 MMHG

## 2020-03-21 DIAGNOSIS — J02.0 STREP THROAT: Primary | ICD-10-CM

## 2020-03-21 DIAGNOSIS — R07.0 THROAT PAIN: ICD-10-CM

## 2020-03-21 LAB
DEPRECATED S PYO AG THROAT QL EIA: POSITIVE
SPECIMEN SOURCE: ABNORMAL

## 2020-03-21 PROCEDURE — 99214 OFFICE O/P EST MOD 30 MIN: CPT | Performed by: PHYSICIAN ASSISTANT

## 2020-03-21 PROCEDURE — 87880 STREP A ASSAY W/OPTIC: CPT | Performed by: PHYSICIAN ASSISTANT

## 2020-03-21 RX ORDER — CLINDAMYCIN HCL 300 MG
300 CAPSULE ORAL 3 TIMES DAILY
Qty: 30 CAPSULE | Refills: 0 | Status: SHIPPED | OUTPATIENT
Start: 2020-03-21 | End: 2020-05-26

## 2020-03-21 NOTE — PROGRESS NOTES
SUBJECTIVE:  Braden De Leon is a 50 year old male with a chief complaint of sore throat.   No fevers, HA or GI sx.  No cough or cold sx  Onset of symptoms was 2 day(s) ago.  Eating and drinking well  No rashes noted     Course of illness: gradual onset and still present.  Severity mild  Current and Associated symptoms: negative other than stated above  Treatment measures tried include Fluids and Rest.  Predisposing factors include exposure to strep.    He had strep a few weeks ago.  Has 3 people in the family who currently have strep.      Past Medical History:   Diagnosis Date     Atypical nevus 03/2019    Dr Wright     Benign neoplasm of colon 11/07, 2/12    adenomatous polyps - due 5 yrs     Cholelithiasis      CKD (chronic kidney disease) stage 3, GFR 30-59 ml/min (H)      Diabetic macular edema (H)     bilateral - avastatin >10 each - Dr CANDELARIA Zuñiga     ED (erectile dysfunction)      Esophageal reflux 1995     Esophageal stricture 8/13     Esophagitis 8/13    LA Grade A     Hyperlipidemia LDL goal < 70      Hypertension goal BP (blood pressure) < 140/90 3/09     Migraine 1986    rare migraines 2/yr     Nephrolithiasis 10/2018    4 mm - urology     Obesity (BMI 30.0-34.9)      Other premature beats 11/07    bigeminy     Proliferative diabetic retinopathy (H) 06/2017    OU - Avastatin - Dr Rosalva Zuñiga     Type 2 diabetes mellitus with complications (H) 1997    neuropathy, retinopathy, nephropathy     Unspecified gastritis and gastroduodenitis without mention of hemorrhage 1995     Current Outpatient Medications   Medication Sig Dispense Refill     amLODIPine (NORVASC) 5 MG tablet Take 1 tablet (5 mg) by mouth daily 90 tablet 0     aspirin 81 MG tablet Take 1 tablet (81 mg) by mouth daily 30 tablet      blood glucose monitoring (SOFTCLIX) lancets USE WHEN TESTING BLOOD SUGARS TWICE DAILY. 2 Box 3     clindamycin (CLEOCIN) 300 MG capsule Take 1 capsule (300 mg) by mouth 3 times daily for 10 days 30 capsule 0      Continuous Blood Gluc Sensor (FREESTYLE CHRISTIANE 14 DAY SENSOR) [facility] [place] WITH FREESTYLE FLASH . REPLACE SENSOR EVERY 14 DAYS 6 each 1     glimepiride (AMARYL) 4 MG tablet TAKE 2 TABLET BY MOUTH EVERY MORNING(BEFORE BREAKFAST) 180 tablet 0     Insulin Lispro (HUMALOG KWIKPEN) 200 UNIT/ML soln Inject 40 units in the morning, 20 units at noon, and 20 units in evening according to sliding scale. 30 mL 11     LEVEMIR FLEXTOUCH 100 UNIT/ML pen INJECT 56 TO 60 UNITS SUBCUTANEOUSLY TWICE DAILY AS DIRECTED 105 mL 0     losartan (COZAAR) 100 MG tablet TAKE 1 TABLET(100 MG) BY MOUTH DAILY 30 tablet 0     naproxen sodium (ALEVE) 220 MG tablet Take 2 tablets (440 mg) by mouth At Bedtime 180 tablet 3     NOVOFINE PLUS 32G X 4 MM XX miscellaneous USE AS DIRECTED 300 each 1     omeprazole (PRILOSEC) 40 MG DR capsule TAKE 1 CAPSULE BY MOUTH EVERY DAY 30 capsule 0     rosuvastatin (CRESTOR) 20 MG tablet TAKE 1 TABLET(20 MG) BY MOUTH DAILY 30 tablet 0     sertraline (ZOLOFT) 100 MG tablet TAKE 2 TABLETS BY MOUTH DAILY 180 tablet 0     sildenafil (VIAGRA) 100 MG tablet Take 0.5-1 tablets ( mg) by mouth daily as needed 18 tablet 3     VICTOZA PEN 18 MG/3ML soln Inject 1.8 mg Subcutaneous every morning 9 mL 11     Social History     Tobacco Use     Smoking status: Never Smoker     Smokeless tobacco: Never Used   Substance Use Topics     Alcohol use: No       ROS:  Review of systems negative except as stated above.    OBJECTIVE:   BP (!) 140/72 (BP Location: Right arm)   Pulse 80   Temp 96.8  F (36  C) (Tympanic)   Wt 117.5 kg (259 lb)   SpO2 100%   BMI 35.13 kg/m    GENERAL APPEARANCE: healthy, alert and no distress  EYES: EOMI,  PERRL, conjunctiva clear  HENT: ear canals and TM's normal.  Nose normal.  Pharynx erythematous without exudate noted.  Uvula midline and no abscess noted  NECK: bilateral anterior cervical adenopathy  RESP: lungs clear to auscultation - no rales, rhonchi or wheezes  CV: regular rates and rhythm,  normal S1 S2, no murmur noted  SKIN: no suspicious lesions or rashes    Rapid Strep test is positive    ASSESSMENT:      Throat pain  Strep throat    PLAN:   Clindamycin as directed as states that Zithromax does not work and change toothbrush in 2 days    Symptomatic treat with gargles, lozenges, and OTC analgesic as needed. Follow-up with primary clinic if not improving.  Advisement given that patient will be contagious for the next 24-48 hours after antibiotics initiated  Continue to monitor BS levels   States that have been good

## 2020-05-26 ENCOUNTER — VIRTUAL VISIT (OUTPATIENT)
Dept: ENDOCRINOLOGY | Facility: CLINIC | Age: 50
End: 2020-05-26
Payer: COMMERCIAL

## 2020-05-26 DIAGNOSIS — Z79.4 TYPE 2 DIABETES MELLITUS WITH STABLE PROLIFERATIVE RETINOPATHY, WITH LONG-TERM CURRENT USE OF INSULIN, UNSPECIFIED LATERALITY (H): Primary | ICD-10-CM

## 2020-05-26 DIAGNOSIS — E11.3559 TYPE 2 DIABETES MELLITUS WITH STABLE PROLIFERATIVE RETINOPATHY, WITH LONG-TERM CURRENT USE OF INSULIN, UNSPECIFIED LATERALITY (H): Primary | ICD-10-CM

## 2020-05-26 DIAGNOSIS — Z79.4 TYPE 2 DIABETES MELLITUS WITH DIABETIC NEPHROPATHY, WITH LONG-TERM CURRENT USE OF INSULIN (H): ICD-10-CM

## 2020-05-26 DIAGNOSIS — R45.86 MOOD CHANGES: ICD-10-CM

## 2020-05-26 DIAGNOSIS — Z79.4 TYPE 2 DIABETES MELLITUS WITH DIABETIC NEUROPATHY, WITH LONG-TERM CURRENT USE OF INSULIN (H): ICD-10-CM

## 2020-05-26 DIAGNOSIS — I10 BENIGN ESSENTIAL HYPERTENSION: ICD-10-CM

## 2020-05-26 DIAGNOSIS — E11.21 TYPE 2 DIABETES MELLITUS WITH DIABETIC NEPHROPATHY, WITH LONG-TERM CURRENT USE OF INSULIN (H): ICD-10-CM

## 2020-05-26 DIAGNOSIS — E11.40 TYPE 2 DIABETES MELLITUS WITH DIABETIC NEUROPATHY, WITH LONG-TERM CURRENT USE OF INSULIN (H): ICD-10-CM

## 2020-05-26 PROCEDURE — 99214 OFFICE O/P EST MOD 30 MIN: CPT | Mod: 95 | Performed by: INTERNAL MEDICINE

## 2020-05-26 RX ORDER — INSULIN LISPRO 200 [IU]/ML
INJECTION, SOLUTION SUBCUTANEOUS
Qty: 27 ML | Refills: 11 | Status: SHIPPED | OUTPATIENT
Start: 2020-05-26 | End: 2021-05-07

## 2020-05-26 RX ORDER — AMLODIPINE BESYLATE 5 MG/1
5 TABLET ORAL DAILY
Qty: 90 TABLET | Refills: 3 | Status: SHIPPED | OUTPATIENT
Start: 2020-05-26 | End: 2020-06-26

## 2020-05-26 RX ORDER — SERTRALINE HYDROCHLORIDE 100 MG/1
200 TABLET, FILM COATED ORAL DAILY
Qty: 180 TABLET | Refills: 0 | Status: SHIPPED | OUTPATIENT
Start: 2020-05-26 | End: 2020-08-26

## 2020-05-26 RX ORDER — LOSARTAN POTASSIUM 100 MG/1
100 TABLET ORAL DAILY
Qty: 90 TABLET | Refills: 3 | Status: SHIPPED | OUTPATIENT
Start: 2020-05-26 | End: 2021-05-07

## 2020-05-26 RX ORDER — INSULIN DETEMIR 100 [IU]/ML
INJECTION, SOLUTION SUBCUTANEOUS
Qty: 36 ML | Refills: 11 | Status: SHIPPED | OUTPATIENT
Start: 2020-05-26 | End: 2020-07-27

## 2020-05-26 RX ORDER — GLIMEPIRIDE 4 MG/1
TABLET ORAL
Qty: 180 TABLET | Refills: 3 | Status: SHIPPED | OUTPATIENT
Start: 2020-05-26 | End: 2021-05-07

## 2020-05-26 NOTE — LETTER
[address] [id number] [phone]       June 4, 2020    Braden De Leon  [address]  [place] [address]    Braden::    We have been calling you regarding a recent refill request we received for Zoloft.  Unfortunately, we were unable to reach you.  We are notifying you that you are due for a phone or video visit for a med check  prior to your next refill.  You can schedule this appointment via [email or web address] or by calling the clinic at [phone].    Sincerely,        Pepe Spear M.D./misty

## 2020-05-26 NOTE — LETTER
"    5/26/2020         RE: Braden De Leon  [address] [facility] [address]        Dear Colleague,    Thank you for referring your patient, Braden De Leon, to the [facility]. Please see a copy of my visit note below.    Braden De Leon is a 50 year old male who is being evaluated via a billable video visit.      The patient has been notified of following:     \"This video visit will be conducted via a call between you and your physician/provider. We have found that certain health care needs can be provided without the need for an in-person physical exam.  This service lets us provide the care you need with a video conversation.  If a prescription is necessary we can send it directly to your pharmacy.  If lab work is needed we can place an order for that and you can then stop by our lab to have the test done at a later time.    Video visits are billed at different rates depending on your insurance coverage.  Please reach out to your insurance provider with any questions.    If during the course of the call the physician/provider feels a video visit is not appropriate, you will not be charged for this service.\"    Patient has given verbal consent for Video visit? Yes    How would you like to obtain your AVS? Reviewed verbally    Patient would like the video invitation sent by: Send to e-mail at: [email or web address]    Will anyone else be joining your video visit? No       Recent issues:  Diabetes followup           1997. Diagnosis of T2DM  Initial treatment with Glucophage x several months, but developed GI symptoms and discontinued  Changed to Glucotrol, then addition of Avandia  3/2005. Saw Dr. СЕРГЕЙ Luevano/Endocrinology  Previous hgbA1c's 8.4-9.1% range  Subsequent addition of basal insulin as QD... Then BID dosing routine  Has used Lantus, Levemir, then Tresiba (QD) and then Levemir.  Tresiba non formulary  5/2019. Stopped Victoza, due to higher expense     Current DM meds:  Levemir                  "      54 units morning and 60 units evening  Humalog U200            40 units breakfast     20-30 units lunch     20-30 units supper     Humalog sscale          5U per 50>150   glimeparide 4 mg        2-tabs in evening     Using Accucheck ? Meter, uses occasionally  12/2017 Began use of Freestyle Armando Personal CGMS device, likes it  Using LibrePersonal CGMS sensor   Reports good overall sensor trends   Some post-supper hyperglycemia    Previous [facility] labs include:  Lab Results   Component Value Date    A1C 7.4 (H) 11/18/2019     11/18/2019    POTASSIUM 3.4 11/18/2019    CHLORIDE 106 11/18/2019    CO2 25 11/18/2019    ANIONGAP 8 11/18/2019     (H) 11/18/2019    BUN 26 11/18/2019    CR 1.44 (H) 11/18/2019    GFRESTIMATED 56 (L) 11/18/2019    GFRESTBLACK 64 11/18/2019    SILVIANO 9.2 11/18/2019    CHOL 133 06/25/2019    TRIG 162 (H) 06/25/2019    HDL 44 06/25/2019    LDL 57 06/25/2019    NHDL 89 06/25/2019    UCRR 202 11/18/2019    MICROL >6,800 11/18/2019    UMALCR 3,366.34 (H) 11/18/2019     DM Complications:              Retinopathy                          Previous laser PC? and Avastin eye injections OU                          Sees Dr. BRIE Zuñiga/[facility]              Nephropathy                          Microalbuminuria                          Taking losartan daily (and lisinopril discontinued Fall '19)              Neuropathy                          Decreased sensation vs numbness at feet        , wife Lela.  He works at [employer]   Sees Dr. MAI Spear/[facility] for gen med evaluations      ROS: 10 point ROS neg other than the symptoms noted above in the HPI.     GENERAL: mild fatigue; wt stable; denies fevers, chills, night sweats.   HEENT: reports good vision acuity (but not today w/ eye patches), no dysphagia, odonophagia, diplopia  THYROID:  no apparent hyper or hypothyroid symptoms  CV: no chest pain, pressure, palpitations, skipped  beats  LUNGS: no SOB, LEAVITT, cough, wheezing   ABDOMEN: no diarrhea, constipation, abdominal pain  EXTREMITIES: no rashes, ulcers, edema  NEUROLOGY: decreased sensation at feet at nighttime; no headaches, denies changes in vision,   MSK: some arthralgias at knees, ankles, back; no apparent weakness  SKIN: no rashes or lesions  PSYCH:  stable mood, no significant anxiety or depression  ENDOCRINE: no heat or cold intolerance     Physical Exam (visual exam)  VS:  no vital signs taken for video visit  GENERAL: healthy, alert and NAD, well dressed, answering questions appropriately  EYES: eyes grossly normal to inspection, conjunctivae and sclerae normal, no exophthalmos or proptosis  THYROID:  no apparent nodules or goiter  LUNGS: no audible wheeze, cough or visible cyanosis, no visible retractions or increased work of breathing  ABDOMEN: abdomen not evaluated  EXTREMITIES: no hand tremors, limited exam  NEUROLOGY: CN grossly intact, mentation intact and speech normal   PSYCH: mentation appears normal, affect normal/bright, judgement and insight intact, normal speech and appearance well groomed       LABS:     All pertinent notes, labs, and images personally reviewed by me.      A/P:       Encounter Diagnoses   Name      Type 2 diabetes mellitus with stable proliferative retinopathy, with long-term current use of insulin, unspecified laterality (H)      Type 2 diabetes mellitus with diabetic nephropathy, with long-term current use of insulin (H)       Type 2 diabetes mellitus with diabetic neuropathy, with long-term current use of insulin (H)          Comments:  Reviewed health history and diabetes issues.     Plan:  Discussed general issues with the diabetes mellitus diagnosis and management  Reviewed the [id number] test which reflects previous overall glucose levels or control  Discussed the importance of blood glucose (BG) vs CGMS sensor glucose testing to assess glucose trends  Provided general overview of diabetes  (oral and injectable) medication use  Reviewed use of the Freestyle Armando CGMS system     Recommend:  Continue current Levemir, Humalog, and glimeparide med plan  Continue use of Armando CGMS sensor  Will update the glimeparide, Humalog and Levemir, amlodipine Rx today  Plan fasting lab appt soon, discussed his nearby  AV clinic option  Keep focus on diet, exercise, weight management  Needs close followup of blood pressure to confirm ideal control  Advise having fasting lipid panel testing and dilated eye examination, at least annually  Keep regular followup ophthalmology evaluations also     Addressed patient questions today        More than 50% of the time spent with Mr. De Leon on counseling / coordinating his care.  Total appointment time was 25 minutes.     Follow-up:  3 mo.     HANK Bustos MD, MS  Endocrinology  [facility]        Video-Visit Details    Type of service:  Video Visit    Video Start Time: 2:10 pm  Video End Time: 2:35 pm    Originating Location (pt. Location): office    Distant Location (provider location):  [facility]     Platform used for Video Visit: Bib              Again, thank you for allowing me to participate in the care of your patient.        Sincerely,        Vic Bustos MD

## 2020-05-26 NOTE — PROGRESS NOTES
"Braden De Leon is a 50 year old male who is being evaluated via a billable video visit.      The patient has been notified of following:     \"This video visit will be conducted via a call between you and your physician/provider. We have found that certain health care needs can be provided without the need for an in-person physical exam.  This service lets us provide the care you need with a video conversation.  If a prescription is necessary we can send it directly to your pharmacy.  If lab work is needed we can place an order for that and you can then stop by our lab to have the test done at a later time.    Video visits are billed at different rates depending on your insurance coverage.  Please reach out to your insurance provider with any questions.    If during the course of the call the physician/provider feels a video visit is not appropriate, you will not be charged for this service.\"    Patient has given verbal consent for Video visit? Yes    How would you like to obtain your AVS? Reviewed verbally    Patient would like the video invitation sent by: Send to e-mail at: [email or web address]    Will anyone else be joining your video visit? No       Recent issues:  Diabetes followup           1997. Diagnosis of T2DM  Initial treatment with Glucophage x several months, but developed GI symptoms and discontinued  Changed to Glucotrol, then addition of Avandia  3/2005. Saw Dr. СЕРГЕЙ Luevano/Endocrinology  Previous hgbA1c's 8.4-9.1% range  Subsequent addition of basal insulin as QD... Then BID dosing routine  Has used Lantus, Levemir, then Tresiba (QD) and then Levemir.  Tresiba non formulary  5/2019. Stopped Victoza, due to higher expense     Current DM meds:  Levemir                       54 units morning and 60 units evening  Humalog U200            40 units breakfast     20-30 units lunch     20-30 units supper     Humalog sscale          5U per 50>150   glimeparide 4 mg        2-tabs in evening     Using Accucheck ? " Meter, uses occasionally  12/2017 Began use of Freestyle Armando Personal CGMS device, likes it  Using LibrBioSciencesonal CGMS sensor   Reports good overall sensor trends   Some post-supper hyperglycemia    Previous [facility] labs include:  Lab Results   Component Value Date    A1C 7.4 (H) 11/18/2019     11/18/2019    POTASSIUM 3.4 11/18/2019    CHLORIDE 106 11/18/2019    CO2 25 11/18/2019    ANIONGAP 8 11/18/2019     (H) 11/18/2019    BUN 26 11/18/2019    CR 1.44 (H) 11/18/2019    GFRESTIMATED 56 (L) 11/18/2019    GFRESTBLACK 64 11/18/2019    SILVIANO 9.2 11/18/2019    CHOL 133 06/25/2019    TRIG 162 (H) 06/25/2019    HDL 44 06/25/2019    LDL 57 06/25/2019    NHDL 89 06/25/2019    UCRR 202 11/18/2019    MICROL >6,800 11/18/2019    UMALCR 3,366.34 (H) 11/18/2019     DM Complications:              Retinopathy                          Previous laser PC? and Avastin eye injections OU                          Sees Dr. BRIE Zuñiga/[facility]              Nephropathy                          Microalbuminuria                          Taking losartan daily (and lisinopril discontinued Fall '19)              Neuropathy                          Decreased sensation vs numbness at feet        , wife Lela.  He works at [employer]   Sees Dr. MAI Spear/ [facility] for [facility] med evaluations      ROS: 10 point ROS neg other than the symptoms noted above in the HPI.     GENERAL: mild fatigue; wt stable; denies fevers, chills, night sweats.   HEENT: reports good vision acuity (but not today w/ eye patches), no dysphagia, odonophagia, diplopia  THYROID:  no apparent hyper or hypothyroid symptoms  CV: no chest pain, pressure, palpitations, skipped beats  LUNGS: no SOB, LEAVITT, cough, wheezing   ABDOMEN: no diarrhea, constipation, abdominal pain  EXTREMITIES: no rashes, ulcers, edema  NEUROLOGY: decreased sensation at feet at nighttime; no headaches, denies changes in vision,   MSK: some  arthralgias at knees, ankles, back; no apparent weakness  SKIN: no rashes or lesions  PSYCH:  stable mood, no significant anxiety or depression  ENDOCRINE: no heat or cold intolerance     Physical Exam (visual exam)  VS:  no vital signs taken for video visit  GENERAL: healthy, alert and NAD, well dressed, answering questions appropriately  EYES: eyes grossly normal to inspection, conjunctivae and sclerae normal, no exophthalmos or proptosis  THYROID:  no apparent nodules or goiter  LUNGS: no audible wheeze, cough or visible cyanosis, no visible retractions or increased work of breathing  ABDOMEN: abdomen not evaluated  EXTREMITIES: no hand tremors, limited exam  NEUROLOGY: CN grossly intact, mentation intact and speech normal   PSYCH: mentation appears normal, affect normal/bright, judgement and insight intact, normal speech and appearance well groomed       LABS:     All pertinent notes, labs, and images personally reviewed by me.      A/P:       Encounter Diagnoses   Name      Type 2 diabetes mellitus with stable proliferative retinopathy, with long-term current use of insulin, unspecified laterality (H)      Type 2 diabetes mellitus with diabetic nephropathy, with long-term current use of insulin (H)       Type 2 diabetes mellitus with diabetic neuropathy, with long-term current use of insulin (H)          Comments:  Reviewed health history and diabetes issues.     Plan:  Discussed general issues with the diabetes mellitus diagnosis and management  Reviewed the [id number] test which reflects previous overall glucose levels or control  Discussed the importance of blood glucose (BG) vs CGMS sensor glucose testing to assess glucose trends  Provided general overview of diabetes (oral and injectable) medication use  Reviewed use of the Freestyle Armando CGMS system     Recommend:  Continue current Levemir, Humalog, and glimeparide med plan  Continue use of Armando CGMS sensor  Will update the glimeparide, Humalog and  Levemir, amlodipine Rx today  Plan fasting lab appt soon, discussed his nearby [facility] clinic option  Keep focus on diet, exercise, weight management  Needs close followup of blood pressure to confirm ideal control  Advise having fasting lipid panel testing and dilated eye examination, at least annually  Keep regular followup ophthalmology evaluations also     Addressed patient questions today        More than 50% of the time spent with Mr. De Leon on counseling / coordinating his care.  Total appointment time was 25 minutes.     Follow-up:  3 mo.     HANK Bustos MD, MS  Endocrinology  [facility]        Video-Visit Details    Type of service:  Video Visit    Video Start Time: 2:10 pm  Video End Time: 2:35 pm    Originating Location (pt. Location): office    Distant Location (provider location):  [facility]     Platform used for Video Visit: [employer]

## 2020-05-27 NOTE — TELEPHONE ENCOUNTER
rx done, advise phone/video visit soon, get new BP, get PHQ/MIGUEL    Lab Results   Component Value Date    A1C 7.4 11/18/2019    A1C 7.4 06/25/2019    A1C 6.4 02/26/2019    A1C 6.5 10/09/2018    A1C 6.7 05/14/2018     BP Readings from Last 3 Encounters:   03/21/20 (!) 140/72   11/18/19 (!) 165/86   06/25/19 132/77     Get new BP    Creatinine   Date Value Ref Range Status   11/18/2019 1.44 (H) 0.66 - 1.25 mg/dL Final     Recent Labs   Lab Test 06/25/19  0932 10/09/18  1031  05/12/15  1040 09/09/14  1017   CHOL 133 148   < > 167 174   HDL 44 40   < > 44 41   LDL 57 77   < > 97 86   TRIG 162* 156*   < > 129 235*   CHOLHDLRATIO  --   --   --  3.8 4.2    < > = values in this interval not displayed.

## 2020-06-04 ENCOUNTER — MYC MEDICAL ADVICE (OUTPATIENT)
Dept: FAMILY MEDICINE | Facility: CLINIC | Age: 50
End: 2020-06-04

## 2020-06-04 NOTE — TELEPHONE ENCOUNTER
Left non-detailed message for patient to call back.  Please schedule follow up when patient calls back.  (see previous notes for details)      I have been unable to reach this patient by phone.  A letter is being sent to the last known home address.  Thanks Jolanta

## 2020-06-05 NOTE — TELEPHONE ENCOUNTER
Patient due for virtual med check    MyChart Message sent  Awaiting response    Rosalind Lu RN  [facility]

## 2020-06-08 ENCOUNTER — VIRTUAL VISIT (OUTPATIENT)
Dept: FAMILY MEDICINE | Facility: CLINIC | Age: 50
End: 2020-06-08
Payer: COMMERCIAL

## 2020-06-08 DIAGNOSIS — R45.86 MOOD CHANGES: ICD-10-CM

## 2020-06-08 DIAGNOSIS — I10 HYPERTENSION GOAL BP (BLOOD PRESSURE) < 140/90: Primary | ICD-10-CM

## 2020-06-08 PROCEDURE — 99214 OFFICE O/P EST MOD 30 MIN: CPT | Mod: 95 | Performed by: FAMILY MEDICINE

## 2020-06-08 PROCEDURE — 96127 BRIEF EMOTIONAL/BEHAV ASSMT: CPT | Mod: 95 | Performed by: FAMILY MEDICINE

## 2020-06-08 RX ORDER — BUSPIRONE HYDROCHLORIDE 10 MG/1
TABLET ORAL
Qty: 60 TABLET | Refills: 0 | Status: SHIPPED | OUTPATIENT
Start: 2020-06-08 | End: 2022-01-01

## 2020-06-08 ASSESSMENT — ANXIETY QUESTIONNAIRES
7. FEELING AFRAID AS IF SOMETHING AWFUL MIGHT HAPPEN: SEVERAL DAYS
GAD7 TOTAL SCORE: 8
1. FEELING NERVOUS, ANXIOUS, OR ON EDGE: SEVERAL DAYS
IF YOU CHECKED OFF ANY PROBLEMS ON THIS QUESTIONNAIRE, HOW DIFFICULT HAVE THESE PROBLEMS MADE IT FOR YOU TO DO YOUR WORK, TAKE CARE OF THINGS AT HOME, OR GET ALONG WITH OTHER PEOPLE: VERY DIFFICULT
6. BECOMING EASILY ANNOYED OR IRRITABLE: NOT AT ALL
3. WORRYING TOO MUCH ABOUT DIFFERENT THINGS: MORE THAN HALF THE DAYS
2. NOT BEING ABLE TO STOP OR CONTROL WORRYING: MORE THAN HALF THE DAYS
5. BEING SO RESTLESS THAT IT IS HARD TO SIT STILL: NOT AT ALL

## 2020-06-08 ASSESSMENT — PATIENT HEALTH QUESTIONNAIRE - PHQ9
SUM OF ALL RESPONSES TO PHQ QUESTIONS 1-9: 11
5. POOR APPETITE OR OVEREATING: MORE THAN HALF THE DAYS

## 2020-06-08 NOTE — PROGRESS NOTES
"Braden De Leon is a 50 year old male who is being evaluated via a billable telephone visit.      The patient has been notified of following:     \"This telephone visit will be conducted via a call between you and your physician/provider. We have found that certain health care needs can be provided without the need for a physical exam.  This service lets us provide the care you need with a short phone conversation.  If a prescription is necessary we can send it directly to your pharmacy.  If lab work is needed we can place an order for that and you can then stop by our lab to have the test done at a later time.    Telephone visits are billed at different rates depending on your insurance coverage. During this emergency period, for some insurers they may be billed the same as an in-person visit.  Please reach out to your insurance provider with any questions.    If during the course of the call the physician/provider feels a telephone visit is not appropriate, you will not be charged for this service.\"    Patient has given verbal consent for Telephone visit?  Yes    What phone number would you like to be contacted at? [phone]    How would you like to obtain your AVS? Mail a copy    Subjective     Braden De Leon is a 50 year old male who presents via phone visit today for the following health issues:    HPI  Hypertension Follow-up      Do you check your blood pressure regularly outside of the clinic? No     Are you following a low salt diet? Yes    Are your blood pressures ever more than 140 on the top number (systolic) OR more   than 90 on the bottom number (diastolic), for example 140/90? No      How many servings of fruits and vegetables do you eat daily?  2-3    On average, how many sweetened beverages do you drink each day (Examples: soda, juice, sweet tea, etc.  Do NOT count diet or artificially sweetened beverages)?   0    How many days per week do you exercise enough to make your heart beat faster?     How " many minutes a day do you exercise enough to make your heart beat faster?     How many days per week do you miss taking your medication? 0     Checks BP when he can -- 138/70. Denies any headaches, lightheadedness, dizziness, vision changes, chest pain, palpitations, shortness of breath, dyspnea, numbness/tingling.      Depression and Anxiety Follow-Up - Sertraline    How are you doing with your depression since your last visit? Right now he feels like its the only thing that's keeping him sane.     How are you doing with your anxiety since your last visit?  Feeling worsening    Are you having other symptoms that might be associated with depression or anxiety? Feeling worsening    Have you had a significant life event? Yes, father's health is failing, working from home and doing a job that isn't his own. 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      Do you have any concerns with your use of alcohol or other drugs? No    If he misses even one dose, will notice and feel more depressed. Even talking with people --   Social History     Tobacco Use     Smoking status: Never Smoker     Smokeless tobacco: Never Used   Substance Use Topics     Alcohol use: No     Drug use: No     PHQ 1/30/2018 10/21/2019 6/8/2020   PHQ-9 Total Score 8 8 11   Q9: Thoughts of better off dead/self-harm past 2 weeks Not at all Not at all Not at all     MIGUEL-7 SCORE 1/30/2018 10/21/2019 6/8/2020   Total Score - - -   Total Score - - -   Total Score 4 7 8     Suicide Assessment Five-step Evaluation and Treatment (SAFE-T)      Patient Active Problem List   Diagnosis     Esophageal reflux     Hypertension goal BP (blood pressure) < 140/90     Type 2 diabetes, HbA1c goal < 7% (H)     Advanced directives, counseling/discussion     Migraine     Benign neoplasm of colon     Esophagitis     Esophageal stricture     Tarsal tunnel syndrome     Other enthesopathy of  ankle and tarsus     ED (erectile dysfunction)     Hyperlipidemia with target LDL less than 70     Diabetic macular edema (H)     Type 2 diabetes mellitus with stable proliferative retinopathy, with long-term current use of insulin, unspecified laterality (H)     Cholelithiasis     Nephrolithiasis     Type 2 diabetes mellitus with diabetic nephropathy, with long-term current use of insulin (H)     Type 2 diabetes mellitus with diabetic neuropathy, with long-term current use of insulin (H)     Morbid obesity (H)     CKD (chronic kidney disease) stage 3, GFR 30-59 ml/min (H)     Past Surgical History:   Procedure Laterality Date     COLONOSCOPY  ,     polyps x 2 - internal hemorrhoids - adenomatous polyps - due every 5 yrs     COLONOSCOPY N/A 2017    diverticula x 1 - due 5 yrs     ESOPHAGOSCOPY, GASTROSCOPY, DUODENOSCOPY (EGD), COMBINED  2013    Esophagitis LA Grade A, esophageal stricture     HC REPAIR ING HERNIA,5+Y/O,REDUCIBL      rt     STRESS ECHO (METRO)  3/12    normal       Social History     Tobacco Use     Smoking status: Never Smoker     Smokeless tobacco: Never Used   Substance Use Topics     Alcohol use: No     Family History   Problem Relation Age of Onset     Hypertension Mother 58     Connective Tissue Disorder Father 70     Diabetes Father 55     Breast Cancer Maternal Grandmother      Chronic Obstructive Pulmonary Disease Paternal Grandfather      Other - See Comments Maternal Grandfather          in WWII     Other - See Comments Paternal Grandmother         Train     Other - See Comments Brother         MVA     Kidney Cancer Brother      Colon Cancer No family hx of            Reviewed and updated as needed this visit by Provider  Tobacco  Allergies  Meds  Problems  Med Hx  Surg Hx  Fam Hx         Review of Systems   Constitutional, HEENT, cardiovascular, pulmonary, gi and gu systems are negative, except as otherwise noted.       Objective   Reported vitals:  There  were no vitals taken for this visit.   healthy, alert and no distress  PSYCH: Alert and oriented times 3; coherent speech, normal   rate and volume, able to articulate logical thoughts, able   to abstract reason, no tangential thoughts, no hallucinations   or delusions  His affect is normal  RESP: No cough, no audible wheezing, able to talk in full sentences  Remainder of exam unable to be completed due to telephone visits    Diagnostic Test Results:  Labs reviewed in Epic        Assessment/Plan:  1. Mood changes: mood worse due to other events. Discussed adding Buspar to augment sertraline and he was agreeable. Discussed potential side effects. Given past history of sensitivities to medications, will start with one tablet per day for one week and then increase to twice daily. Follow up with PCP in 1 month.  - busPIRone (BUSPAR) 10 MG tablet; Take 1 tablet (10 mg) by mouth daily for 7 days, THEN 1 tablet (10 mg) 2 times daily for 23 days.  Dispense: 60 tablet; Refill: 0    2. Hypertension goal BP (blood pressure) < 140/90: stable, asymptomatic. Continue current regimen.    Return in about 1 month (around 7/8/2020) for follow up mood.      Phone call duration:  24 minutes    Rainer Garcia DO

## 2020-06-09 ASSESSMENT — ANXIETY QUESTIONNAIRES: GAD7 TOTAL SCORE: 8

## 2020-06-19 DIAGNOSIS — E11.3559 TYPE 2 DIABETES MELLITUS WITH STABLE PROLIFERATIVE RETINOPATHY, WITH LONG-TERM CURRENT USE OF INSULIN, UNSPECIFIED LATERALITY (H): ICD-10-CM

## 2020-06-19 DIAGNOSIS — Z79.4 TYPE 2 DIABETES MELLITUS WITH STABLE PROLIFERATIVE RETINOPATHY, WITH LONG-TERM CURRENT USE OF INSULIN, UNSPECIFIED LATERALITY (H): ICD-10-CM

## 2020-06-22 RX ORDER — FLASH GLUCOSE SENSOR
KIT MISCELLANEOUS
Qty: 6 EACH | Refills: 1 | Status: SHIPPED | OUTPATIENT
Start: 2020-06-22 | End: 2021-03-22

## 2020-06-22 NOTE — TELEPHONE ENCOUNTER
Prescription approved per [facility] Refill Protocol.    Olivia KANG RN

## 2020-06-25 DIAGNOSIS — Z79.4 TYPE 2 DIABETES MELLITUS WITH STABLE PROLIFERATIVE RETINOPATHY, WITH LONG-TERM CURRENT USE OF INSULIN, UNSPECIFIED LATERALITY (H): ICD-10-CM

## 2020-06-25 DIAGNOSIS — E78.5 HYPERLIPIDEMIA WITH TARGET LDL LESS THAN 70: ICD-10-CM

## 2020-06-25 DIAGNOSIS — Z79.4 TYPE 2 DIABETES MELLITUS WITH DIABETIC NEUROPATHY, WITH LONG-TERM CURRENT USE OF INSULIN (H): ICD-10-CM

## 2020-06-25 DIAGNOSIS — Z79.4 TYPE 2 DIABETES MELLITUS WITH DIABETIC NEPHROPATHY, WITH LONG-TERM CURRENT USE OF INSULIN (H): ICD-10-CM

## 2020-06-25 DIAGNOSIS — E11.21 TYPE 2 DIABETES MELLITUS WITH DIABETIC NEPHROPATHY, WITH LONG-TERM CURRENT USE OF INSULIN (H): ICD-10-CM

## 2020-06-25 DIAGNOSIS — E11.3559 TYPE 2 DIABETES MELLITUS WITH STABLE PROLIFERATIVE RETINOPATHY, WITH LONG-TERM CURRENT USE OF INSULIN, UNSPECIFIED LATERALITY (H): ICD-10-CM

## 2020-06-25 DIAGNOSIS — K21.9 GASTROESOPHAGEAL REFLUX DISEASE WITHOUT ESOPHAGITIS: ICD-10-CM

## 2020-06-25 DIAGNOSIS — I10 BENIGN ESSENTIAL HYPERTENSION: ICD-10-CM

## 2020-06-25 DIAGNOSIS — E11.40 TYPE 2 DIABETES MELLITUS WITH DIABETIC NEUROPATHY, WITH LONG-TERM CURRENT USE OF INSULIN (H): ICD-10-CM

## 2020-06-25 RX ORDER — OMEPRAZOLE 40 MG/1
CAPSULE, DELAYED RELEASE ORAL
Qty: 90 CAPSULE | Refills: 2 | Status: SHIPPED | OUTPATIENT
Start: 2020-06-25 | End: 2021-05-07

## 2020-06-25 RX ORDER — ROSUVASTATIN CALCIUM 20 MG/1
TABLET, COATED ORAL
Qty: 30 TABLET | Refills: 0 | Status: SHIPPED | OUTPATIENT
Start: 2020-06-25 | End: 2020-07-27

## 2020-06-25 NOTE — TELEPHONE ENCOUNTER
Rosuvastatin - Medication is being filled for 1 time refill only due to:  Patient needs labs.  Omeprazole - Prescription approved per [facility] [place] Refill Protocol.  Cecily Barragan RN

## 2020-06-26 RX ORDER — AMLODIPINE BESYLATE 5 MG/1
TABLET ORAL
Qty: 90 TABLET | Refills: 0 | Status: SHIPPED | OUTPATIENT
Start: 2020-06-26 | End: 2020-09-24

## 2020-07-21 ENCOUNTER — TELEPHONE (OUTPATIENT)
Dept: ENDOCRINOLOGY | Facility: CLINIC | Age: 50
End: 2020-07-21

## 2020-07-21 DIAGNOSIS — E11.3559 TYPE 2 DIABETES MELLITUS WITH STABLE PROLIFERATIVE RETINOPATHY, WITH LONG-TERM CURRENT USE OF INSULIN, UNSPECIFIED LATERALITY (H): Primary | ICD-10-CM

## 2020-07-21 DIAGNOSIS — Z79.4 TYPE 2 DIABETES MELLITUS WITH STABLE PROLIFERATIVE RETINOPATHY, WITH LONG-TERM CURRENT USE OF INSULIN, UNSPECIFIED LATERALITY (H): Primary | ICD-10-CM

## 2020-07-21 NOTE — TELEPHONE ENCOUNTER
Reason for Call:  Medication or medication refill:    Do you use a [facility]?  Name of the pharmacy and phone number for the current request:  [id number] [place], [place] - [address] [facility] [address]      Name of the medication requested: LEVEMIR FLEXTOUCH     Other request: Patient said his insurance is no longer covering Levemir. Patient will need Traveao (unsure of the spelling) or Lantus. Thank you.    Can we leave a detailed message on this number? YES    Phone number patient can be reached at: Home number on file [phone] (home)    Best Time: Any    Call taken on 7/21/2020 at 2:21 PM by Evita Silva

## 2020-07-25 DIAGNOSIS — E11.3559 TYPE 2 DIABETES MELLITUS WITH STABLE PROLIFERATIVE RETINOPATHY, WITH LONG-TERM CURRENT USE OF INSULIN, UNSPECIFIED LATERALITY (H): ICD-10-CM

## 2020-07-25 DIAGNOSIS — E11.21 TYPE 2 DIABETES MELLITUS WITH DIABETIC NEPHROPATHY, WITH LONG-TERM CURRENT USE OF INSULIN (H): ICD-10-CM

## 2020-07-25 DIAGNOSIS — Z79.4 TYPE 2 DIABETES MELLITUS WITH STABLE PROLIFERATIVE RETINOPATHY, WITH LONG-TERM CURRENT USE OF INSULIN, UNSPECIFIED LATERALITY (H): ICD-10-CM

## 2020-07-25 DIAGNOSIS — Z79.4 TYPE 2 DIABETES MELLITUS WITH DIABETIC NEPHROPATHY, WITH LONG-TERM CURRENT USE OF INSULIN (H): ICD-10-CM

## 2020-07-25 DIAGNOSIS — Z79.4 TYPE 2 DIABETES MELLITUS WITH DIABETIC NEUROPATHY, WITH LONG-TERM CURRENT USE OF INSULIN (H): ICD-10-CM

## 2020-07-25 DIAGNOSIS — E11.40 TYPE 2 DIABETES MELLITUS WITH DIABETIC NEUROPATHY, WITH LONG-TERM CURRENT USE OF INSULIN (H): ICD-10-CM

## 2020-07-25 DIAGNOSIS — E78.5 HYPERLIPIDEMIA WITH TARGET LDL LESS THAN 70: ICD-10-CM

## 2020-07-27 RX ORDER — ROSUVASTATIN CALCIUM 20 MG/1
TABLET, COATED ORAL
Qty: 90 TABLET | Refills: 1 | Status: SHIPPED | OUTPATIENT
Start: 2020-07-27 | End: 2021-01-21

## 2020-07-28 ENCOUNTER — MYC MEDICAL ADVICE (OUTPATIENT)
Dept: ENDOCRINOLOGY | Facility: CLINIC | Age: 50
End: 2020-07-28

## 2020-07-28 NOTE — TELEPHONE ENCOUNTER
Routing to team to inform and assist in scheduling.   Chaya Renee RN   [facility] - Triage

## 2020-07-28 NOTE — TELEPHONE ENCOUNTER
BP check soon, VV soon, recent eye exam    RX done    Advise phone/video visit follow up soon    Fasting labs soon    CPX when able    Lab Results   Component Value Date    A1C 7.4 11/18/2019    A1C 7.4 06/25/2019    A1C 6.4 02/26/2019    A1C 6.5 10/09/2018    A1C 6.7 05/14/2018     Recent Labs   Lab Test 06/25/19  0932 10/09/18  1031  05/12/15  1040 09/09/14  1017   CHOL 133 148   < > 167 174   HDL 44 40   < > 44 41   LDL 57 77   < > 97 86   TRIG 162* 156*   < > 129 235*   CHOLHDLRATIO  --   --   --  3.8 4.2    < > = values in this interval not displayed.     BP Readings from Last 3 Encounters:   03/21/20 (!) 140/72   11/18/19 (!) 165/86   06/25/19 132/77

## 2020-07-28 NOTE — TELEPHONE ENCOUNTER
Message noted, new Lantus Solostar pen Rx sent to patient's pharmacy as requested.    HANK Bustos MD, MS  Endocrinology  [facility]

## 2020-07-30 NOTE — TELEPHONE ENCOUNTER
Reviewed this previous message from patient about medication and formulary status.    HANK Bustos MD, MS  Endocrinology  [facility]

## 2020-08-12 ENCOUNTER — TELEPHONE (OUTPATIENT)
Dept: ENDOCRINOLOGY | Facility: CLINIC | Age: 50
End: 2020-08-12

## 2020-08-12 NOTE — TELEPHONE ENCOUNTER
Reason for Call:  Form, our goal is to have forms completed with 72 hours, however, some forms may require a visit or additional information.    Type of letter, form or note:  DPS/DVS Diabetes form B65    Who is the form from?: [place] DVS/DPS (if other please explain) Last one was filed on 3/26/2019      Media Information      Scan on 5/13/2019 12:59 PM by Scan, Non-Provider: [facility] INSULIN TREATED DIABETES [facility]        Document Information     Business/Insurance/Care Coordination/Health Form - Encounter    [facility] INSULIN TRE[employer] DIABETES [facility]    05/13/2019 12:59 PM    Attached To:    Office Visit on 2/26/19 with Vic Bustos MD    Source Information     Scan, Non-Provider          Where did the form come from: patient is not sure why this didn't get filed.  Can you complete and send in asap.  Deadline is 8/20/20202      What clinic location was the form placed at?: [facility]    Where the form was placed: do we have in clinic?    What number is listed as a contact on the form?: [phone], can leave detailed message       Additional comments: please call him if there is something he should do    Call taken on 8/12/2020 at 3:14 PM by Cristela Adams [facility]

## 2020-08-13 NOTE — TELEPHONE ENCOUNTER
I do not recall seeing a MNDMV form for this patient, and will route message to medical assistant.    HANK Bustos MD, MS  [facility]

## 2020-11-22 DIAGNOSIS — R45.86 MOOD CHANGES: ICD-10-CM

## 2020-11-22 RX ORDER — SERTRALINE HYDROCHLORIDE 100 MG/1
TABLET, FILM COATED ORAL
Qty: 180 TABLET | Refills: 0 | Status: SHIPPED | OUTPATIENT
Start: 2020-11-22 | End: 2021-02-21

## 2020-11-22 NOTE — TELEPHONE ENCOUNTER
RX done, last chin 11/2018, please do phq, avani    Advise phone/video visit follow up soon    Fasting labs soon    CPX when able    BP Readings from Last 3 Encounters:   03/21/20 (!) 140/72   11/18/19 (!) 165/86   06/25/19 132/77     Lab Results   Component Value Date    A1C 7.4 11/18/2019    A1C 7.4 06/25/2019    A1C 6.4 02/26/2019    A1C 6.5 10/09/2018    A1C 6.7 05/14/2018     Recent Labs   Lab Test 06/25/19  0932 10/09/18  1031 05/12/15  1040 05/12/15  1040 09/09/14  1017   CHOL 133 148   < > 167 174   HDL 44 40   < > 44 41   LDL 57 77   < > 97 86   TRIG 162* 156*   < > 129 235*   CHOLHDLRATIO  --   --   --  3.8 4.2    < > = values in this interval not displayed.     Fasting labs soon with BP recheck    Cmp, lipid reflex, ck, cbc, a1c, tsh reflex, ua micro, microalbumin, psa, fir

## 2020-11-22 NOTE — LETTER
[address] [address] [phone]       November 30, 2020    Braden ORDOÑEZ Moises  [address]  [facility] [address]    To Whom it May Concern:    Thank you for your refill request for your sertraline (ZOLOFT) 100 MG tablet.     After reviewing your chart, you are due for an updated PHQ-9 and MIGUEL-7, which are questionnaires regarding your mood over the last 2 weeks.   Please fill them out and send it back to me.     Also, you are due for a virtual visit soon with fasting lab work, and a routine physical when you are able.   You can schedule these via [employer] or by contacting the clinic at [phone].     Thank you for your time.      Sincerely,        Pepe Spear M.D./KASIE RN

## 2020-11-27 NOTE — TELEPHONE ENCOUNTER
Sent mychart with assessments.    TC- please call to schedule below    Michelle Kemp RN- Triage [employer]

## 2020-11-30 ENCOUNTER — MYC MEDICAL ADVICE (OUTPATIENT)
Dept: FAMILY MEDICINE | Facility: CLINIC | Age: 50
End: 2020-11-30

## 2020-11-30 NOTE — TELEPHONE ENCOUNTER
Attempt #2  Radhahart sent    Letter sent with PHQ9/GAD7 and self-addressed, stamped, return envelope  Closing encounter      Rosalind Lu RN  [facility]

## 2020-12-10 ENCOUNTER — TRANSFERRED RECORDS (OUTPATIENT)
Dept: HEALTH INFORMATION MANAGEMENT | Facility: CLINIC | Age: 50
End: 2020-12-10

## 2020-12-10 LAB — RETINOPATHY: POSITIVE

## 2020-12-23 ASSESSMENT — ANXIETY QUESTIONNAIRES
6. BECOMING EASILY ANNOYED OR IRRITABLE: SEVERAL DAYS
GAD7 TOTAL SCORE: 9
IF YOU CHECKED OFF ANY PROBLEMS ON THIS QUESTIONNAIRE, HOW DIFFICULT HAVE THESE PROBLEMS MADE IT FOR YOU TO DO YOUR WORK, TAKE CARE OF THINGS AT HOME, OR GET ALONG WITH OTHER PEOPLE: SOMEWHAT DIFFICULT
2. NOT BEING ABLE TO STOP OR CONTROL WORRYING: MORE THAN HALF THE DAYS
5. BEING SO RESTLESS THAT IT IS HARD TO SIT STILL: NOT AT ALL
1. FEELING NERVOUS, ANXIOUS, OR ON EDGE: SEVERAL DAYS
7. FEELING AFRAID AS IF SOMETHING AWFUL MIGHT HAPPEN: MORE THAN HALF THE DAYS
3. WORRYING TOO MUCH ABOUT DIFFERENT THINGS: MORE THAN HALF THE DAYS

## 2020-12-23 ASSESSMENT — PATIENT HEALTH QUESTIONNAIRE - PHQ9
SUM OF ALL RESPONSES TO PHQ QUESTIONS 1-9: 9
5. POOR APPETITE OR OVEREATING: SEVERAL DAYS

## 2020-12-23 NOTE — TELEPHONE ENCOUNTER
PHQ 10/21/2019 6/8/2020 12/23/2020   PHQ-9 Total Score 8 11 9   Q9: Thoughts of better off dead/self-harm past 2 weeks Not at all Not at all Not at all     MIGUEL-7 SCORE 10/21/2019 6/8/2020 12/23/2020   Total Score - - -   Total Score - - -   Total Score 7 8 9           Assessment received in the mail today

## 2020-12-23 NOTE — TELEPHONE ENCOUNTER
Pt needs to be seen     Please help pt schedule a med check soon (1-2 weeks)     Thank you     Shreya Rivas RN, BSN  [facility]

## 2020-12-24 ASSESSMENT — ANXIETY QUESTIONNAIRES: GAD7 TOTAL SCORE: 9

## 2021-01-01 ENCOUNTER — VIRTUAL VISIT (OUTPATIENT)
Dept: ENDOCRINOLOGY | Facility: CLINIC | Age: 51
End: 2021-01-01
Payer: COMMERCIAL

## 2021-01-01 ENCOUNTER — ANCILLARY PROCEDURE (OUTPATIENT)
Dept: GENERAL RADIOLOGY | Facility: CLINIC | Age: 51
End: 2021-01-01
Attending: FAMILY MEDICINE
Payer: COMMERCIAL

## 2021-01-01 ENCOUNTER — HEALTH MAINTENANCE LETTER (OUTPATIENT)
Age: 51
End: 2021-01-01

## 2021-01-01 ENCOUNTER — ALLIED HEALTH/NURSE VISIT (OUTPATIENT)
Dept: FAMILY MEDICINE | Facility: CLINIC | Age: 51
End: 2021-01-01
Payer: COMMERCIAL

## 2021-01-01 ENCOUNTER — OFFICE VISIT (OUTPATIENT)
Dept: FAMILY MEDICINE | Facility: CLINIC | Age: 51
End: 2021-01-01
Payer: COMMERCIAL

## 2021-01-01 VITALS
HEIGHT: 72 IN | OXYGEN SATURATION: 99 % | TEMPERATURE: 98.4 F | BODY MASS INDEX: 32.22 KG/M2 | DIASTOLIC BLOOD PRESSURE: 88 MMHG | HEART RATE: 84 BPM | WEIGHT: 237.9 LBS | SYSTOLIC BLOOD PRESSURE: 162 MMHG

## 2021-01-01 VITALS — SYSTOLIC BLOOD PRESSURE: 132 MMHG | DIASTOLIC BLOOD PRESSURE: 84 MMHG

## 2021-01-01 DIAGNOSIS — M89.8X1 PAIN OF RIGHT CLAVICLE: ICD-10-CM

## 2021-01-01 DIAGNOSIS — E11.21 TYPE 2 DIABETES MELLITUS WITH DIABETIC NEPHROPATHY, WITH LONG-TERM CURRENT USE OF INSULIN (H): ICD-10-CM

## 2021-01-01 DIAGNOSIS — M25.511 RIGHT SHOULDER PAIN, UNSPECIFIED CHRONICITY: ICD-10-CM

## 2021-01-01 DIAGNOSIS — E11.40 TYPE 2 DIABETES MELLITUS WITH DIABETIC NEUROPATHY, WITH LONG-TERM CURRENT USE OF INSULIN (H): ICD-10-CM

## 2021-01-01 DIAGNOSIS — I10 HYPERTENSION GOAL BP (BLOOD PRESSURE) < 140/90: ICD-10-CM

## 2021-01-01 DIAGNOSIS — Z79.4 TYPE 2 DIABETES MELLITUS WITH STABLE PROLIFERATIVE RETINOPATHY, WITH LONG-TERM CURRENT USE OF INSULIN, UNSPECIFIED LATERALITY (H): ICD-10-CM

## 2021-01-01 DIAGNOSIS — E11.3559 TYPE 2 DIABETES MELLITUS WITH STABLE PROLIFERATIVE RETINOPATHY, WITH LONG-TERM CURRENT USE OF INSULIN, UNSPECIFIED LATERALITY (H): Primary | ICD-10-CM

## 2021-01-01 DIAGNOSIS — M79.18 RHOMBOID PAIN: ICD-10-CM

## 2021-01-01 DIAGNOSIS — Z79.4 TYPE 2 DIABETES MELLITUS WITH DIABETIC NEUROPATHY, WITH LONG-TERM CURRENT USE OF INSULIN (H): ICD-10-CM

## 2021-01-01 DIAGNOSIS — Z79.4 TYPE 2 DIABETES MELLITUS WITH STABLE PROLIFERATIVE RETINOPATHY, WITH LONG-TERM CURRENT USE OF INSULIN, UNSPECIFIED LATERALITY (H): Primary | ICD-10-CM

## 2021-01-01 DIAGNOSIS — E11.9 TYPE 2 DIABETES, HBA1C GOAL < 7% (H): ICD-10-CM

## 2021-01-01 DIAGNOSIS — Z79.4 TYPE 2 DIABETES MELLITUS WITH DIABETIC NEPHROPATHY, WITH LONG-TERM CURRENT USE OF INSULIN (H): ICD-10-CM

## 2021-01-01 DIAGNOSIS — Z12.5 SCREENING FOR PROSTATE CANCER: Primary | ICD-10-CM

## 2021-01-01 DIAGNOSIS — M25.511 RIGHT SHOULDER PAIN, UNSPECIFIED CHRONICITY: Primary | ICD-10-CM

## 2021-01-01 DIAGNOSIS — M62.838 TRAPEZIUS MUSCLE SPASM: ICD-10-CM

## 2021-01-01 DIAGNOSIS — E11.3559 TYPE 2 DIABETES MELLITUS WITH STABLE PROLIFERATIVE RETINOPATHY, WITH LONG-TERM CURRENT USE OF INSULIN, UNSPECIFIED LATERALITY (H): ICD-10-CM

## 2021-01-01 LAB
ALT SERPL W P-5'-P-CCNC: 74 U/L (ref 0–70)
ANION GAP SERPL CALCULATED.3IONS-SCNC: 5 MMOL/L (ref 3–14)
BUN SERPL-MCNC: 22 MG/DL (ref 7–30)
CALCIUM SERPL-MCNC: 9.2 MG/DL (ref 8.5–10.1)
CHLORIDE BLD-SCNC: 106 MMOL/L (ref 94–109)
CO2 SERPL-SCNC: 27 MMOL/L (ref 20–32)
CREAT SERPL-MCNC: 1.69 MG/DL (ref 0.52–1.25)
CREAT UR-MCNC: 93 MG/DL
GFR SERPL CREATININE-BSD FRML MDRD: 45 ML/MIN/1.73M2
GLUCOSE BLD-MCNC: 150 MG/DL (ref 70–99)
HBA1C MFR BLD: 9.4 % (ref 0–5.6)
MICROALBUMIN UR-MCNC: 4220 MG/L
MICROALBUMIN/CREAT UR: 4537.63 MG/G CR
POTASSIUM BLD-SCNC: 4.2 MMOL/L (ref 3.4–5.3)
SODIUM SERPL-SCNC: 138 MMOL/L (ref 133–144)

## 2021-01-01 PROCEDURE — 83036 HEMOGLOBIN GLYCOSYLATED A1C: CPT | Performed by: FAMILY MEDICINE

## 2021-01-01 PROCEDURE — 99214 OFFICE O/P EST MOD 30 MIN: CPT | Performed by: FAMILY MEDICINE

## 2021-01-01 PROCEDURE — 84460 ALANINE AMINO (ALT) (SGPT): CPT | Performed by: FAMILY MEDICINE

## 2021-01-01 PROCEDURE — 73030 X-RAY EXAM OF SHOULDER: CPT | Mod: RT | Performed by: RADIOLOGY

## 2021-01-01 PROCEDURE — 82043 UR ALBUMIN QUANTITATIVE: CPT | Performed by: FAMILY MEDICINE

## 2021-01-01 PROCEDURE — 80048 BASIC METABOLIC PNL TOTAL CA: CPT | Performed by: FAMILY MEDICINE

## 2021-01-01 PROCEDURE — 99214 OFFICE O/P EST MOD 30 MIN: CPT | Mod: GT | Performed by: INTERNAL MEDICINE

## 2021-01-01 PROCEDURE — 36415 COLL VENOUS BLD VENIPUNCTURE: CPT | Performed by: FAMILY MEDICINE

## 2021-01-01 PROCEDURE — 99207 PR NO CHARGE NURSE ONLY: CPT

## 2021-01-01 RX ORDER — CYCLOBENZAPRINE HCL 5 MG
5-10 TABLET ORAL
Qty: 20 TABLET | Refills: 1 | Status: SHIPPED | OUTPATIENT
Start: 2021-01-01 | End: 2022-01-01

## 2021-01-01 RX ORDER — FLASH GLUCOSE SENSOR
KIT MISCELLANEOUS
Qty: 2 EACH | Refills: 11 | Status: SHIPPED | OUTPATIENT
Start: 2021-01-01

## 2021-01-01 RX ORDER — HYDROCODONE BITARTRATE AND ACETAMINOPHEN 5; 325 MG/1; MG/1
1 TABLET ORAL EVERY 6 HOURS PRN
Qty: 12 TABLET | Refills: 0 | Status: SHIPPED | OUTPATIENT
Start: 2021-01-01 | End: 2021-01-01

## 2021-01-01 RX ORDER — AMLODIPINE BESYLATE 5 MG/1
5 TABLET ORAL DAILY
Qty: 90 TABLET | Refills: 1 | Status: SHIPPED | OUTPATIENT
Start: 2021-01-01 | End: 2022-01-01

## 2021-01-01 ASSESSMENT — ANXIETY QUESTIONNAIRES
GAD7 TOTAL SCORE: 3
7. FEELING AFRAID AS IF SOMETHING AWFUL MIGHT HAPPEN: NOT AT ALL
2. NOT BEING ABLE TO STOP OR CONTROL WORRYING: SEVERAL DAYS
8. IF YOU CHECKED OFF ANY PROBLEMS, HOW DIFFICULT HAVE THESE MADE IT FOR YOU TO DO YOUR WORK, TAKE CARE OF THINGS AT HOME, OR GET ALONG WITH OTHER PEOPLE?: SOMEWHAT DIFFICULT
6. BECOMING EASILY ANNOYED OR IRRITABLE: NOT AT ALL
3. WORRYING TOO MUCH ABOUT DIFFERENT THINGS: NOT AT ALL
7. FEELING AFRAID AS IF SOMETHING AWFUL MIGHT HAPPEN: NOT AT ALL
GAD7 TOTAL SCORE: 3
5. BEING SO RESTLESS THAT IT IS HARD TO SIT STILL: SEVERAL DAYS
4. TROUBLE RELAXING: SEVERAL DAYS
1. FEELING NERVOUS, ANXIOUS, OR ON EDGE: NOT AT ALL

## 2021-01-01 ASSESSMENT — PATIENT HEALTH QUESTIONNAIRE - PHQ9
SUM OF ALL RESPONSES TO PHQ QUESTIONS 1-9: 9
10. IF YOU CHECKED OFF ANY PROBLEMS, HOW DIFFICULT HAVE THESE PROBLEMS MADE IT FOR YOU TO DO YOUR WORK, TAKE CARE OF THINGS AT HOME, OR GET ALONG WITH OTHER PEOPLE: SOMEWHAT DIFFICULT

## 2021-01-01 ASSESSMENT — MIFFLIN-ST. JEOR: SCORE: 1962.11

## 2021-01-14 ENCOUNTER — HEALTH MAINTENANCE LETTER (OUTPATIENT)
Age: 51
End: 2021-01-14

## 2021-03-22 DIAGNOSIS — Z79.4 TYPE 2 DIABETES MELLITUS WITH STABLE PROLIFERATIVE RETINOPATHY, WITH LONG-TERM CURRENT USE OF INSULIN, UNSPECIFIED LATERALITY (H): ICD-10-CM

## 2021-03-22 DIAGNOSIS — E11.3559 TYPE 2 DIABETES MELLITUS WITH STABLE PROLIFERATIVE RETINOPATHY, WITH LONG-TERM CURRENT USE OF INSULIN, UNSPECIFIED LATERALITY (H): ICD-10-CM

## 2021-03-22 NOTE — TELEPHONE ENCOUNTER
LOV 5- TL, follow up 3 months - not done  No future OV scheduled    SIG/Pharm note given    Vanessa Carrasco,  (R)

## 2021-03-23 RX ORDER — FLASH GLUCOSE SENSOR
KIT MISCELLANEOUS
Qty: 6 EACH | Refills: 0 | Status: SHIPPED | OUTPATIENT
Start: 2021-03-23 | End: 2021-06-23

## 2021-03-23 NOTE — TELEPHONE ENCOUNTER
Routing refill request to provider for review/approval because:  Drug not on the [facility] refill protocol       Please review and authorize if appropriate.    Thank you,   Ronnie PRUITT RN

## 2021-04-07 ENCOUNTER — ALLIED HEALTH/NURSE VISIT (OUTPATIENT)
Dept: FAMILY MEDICINE | Facility: CLINIC | Age: 51
End: 2021-04-07
Payer: COMMERCIAL

## 2021-04-07 VITALS — SYSTOLIC BLOOD PRESSURE: 118 MMHG | DIASTOLIC BLOOD PRESSURE: 74 MMHG

## 2021-04-07 DIAGNOSIS — E11.3559 TYPE 2 DIABETES MELLITUS WITH STABLE PROLIFERATIVE RETINOPATHY, WITH LONG-TERM CURRENT USE OF INSULIN, UNSPECIFIED LATERALITY (H): ICD-10-CM

## 2021-04-07 DIAGNOSIS — Z79.4 TYPE 2 DIABETES MELLITUS WITH STABLE PROLIFERATIVE RETINOPATHY, WITH LONG-TERM CURRENT USE OF INSULIN, UNSPECIFIED LATERALITY (H): ICD-10-CM

## 2021-04-07 DIAGNOSIS — Z79.4 TYPE 2 DIABETES MELLITUS WITH DIABETIC NEPHROPATHY, WITH LONG-TERM CURRENT USE OF INSULIN (H): ICD-10-CM

## 2021-04-07 DIAGNOSIS — Z01.30 BP CHECK: Primary | ICD-10-CM

## 2021-04-07 DIAGNOSIS — E11.21 TYPE 2 DIABETES MELLITUS WITH DIABETIC NEPHROPATHY, WITH LONG-TERM CURRENT USE OF INSULIN (H): ICD-10-CM

## 2021-04-07 LAB
ALT SERPL W P-5'-P-CCNC: 42 U/L (ref 0–70)
ANION GAP SERPL CALCULATED.3IONS-SCNC: 6 MMOL/L (ref 3–14)
BUN SERPL-MCNC: 39 MG/DL (ref 7–30)
CALCIUM SERPL-MCNC: 9.3 MG/DL (ref 8.5–10.1)
CHLORIDE SERPL-SCNC: 112 MMOL/L (ref 94–109)
CHOLEST SERPL-MCNC: 147 MG/DL
CO2 SERPL-SCNC: 22 MMOL/L (ref 20–32)
CREAT SERPL-MCNC: 1.92 MG/DL (ref 0.66–1.25)
GFR SERPL CREATININE-BSD FRML MDRD: 39 ML/MIN/{1.73_M2}
GLUCOSE SERPL-MCNC: 95 MG/DL (ref 70–99)
HBA1C MFR BLD: 8.5 % (ref 0–5.6)
HDLC SERPL-MCNC: 60 MG/DL
LDLC SERPL CALC-MCNC: 59 MG/DL
NONHDLC SERPL-MCNC: 87 MG/DL
POTASSIUM SERPL-SCNC: 4.2 MMOL/L (ref 3.4–5.3)
SODIUM SERPL-SCNC: 140 MMOL/L (ref 133–144)
TRIGL SERPL-MCNC: 140 MG/DL

## 2021-04-07 PROCEDURE — 83036 HEMOGLOBIN GLYCOSYLATED A1C: CPT | Performed by: INTERNAL MEDICINE

## 2021-04-07 PROCEDURE — 36415 COLL VENOUS BLD VENIPUNCTURE: CPT | Performed by: INTERNAL MEDICINE

## 2021-04-07 PROCEDURE — 84460 ALANINE AMINO (ALT) (SGPT): CPT | Performed by: INTERNAL MEDICINE

## 2021-04-07 PROCEDURE — 80048 BASIC METABOLIC PNL TOTAL CA: CPT | Performed by: INTERNAL MEDICINE

## 2021-04-07 PROCEDURE — 80061 LIPID PANEL: CPT | Performed by: INTERNAL MEDICINE

## 2021-04-07 PROCEDURE — 99207 PR NO CHARGE NURSE ONLY: CPT

## 2021-04-07 NOTE — PROGRESS NOTES
Braden De Leon is a 51 year old patient who comes in today for a Blood Pressure check.  Initial BP:  There were no vitals taken for this visit.     Data Unavailable  Disposition: follow-up as previously indicated by provider

## 2021-04-09 ENCOUNTER — IMMUNIZATION (OUTPATIENT)
Dept: NURSING | Facility: CLINIC | Age: 51
End: 2021-04-09
Payer: COMMERCIAL

## 2021-04-09 PROCEDURE — 91300 PR COVID VAC PFIZER DIL RECON 30 MCG/0.3 ML IM: CPT

## 2021-04-09 PROCEDURE — 0001A PR COVID VAC PFIZER DIL RECON 30 MCG/0.3 ML IM: CPT

## 2021-04-21 DIAGNOSIS — Z79.4 TYPE 2 DIABETES MELLITUS WITH DIABETIC NEUROPATHY, WITH LONG-TERM CURRENT USE OF INSULIN (H): ICD-10-CM

## 2021-04-21 DIAGNOSIS — Z79.4 TYPE 2 DIABETES MELLITUS WITH DIABETIC NEPHROPATHY, WITH LONG-TERM CURRENT USE OF INSULIN (H): ICD-10-CM

## 2021-04-21 DIAGNOSIS — E11.21 TYPE 2 DIABETES MELLITUS WITH DIABETIC NEPHROPATHY, WITH LONG-TERM CURRENT USE OF INSULIN (H): ICD-10-CM

## 2021-04-21 DIAGNOSIS — E78.5 HYPERLIPIDEMIA WITH TARGET LDL LESS THAN 70: ICD-10-CM

## 2021-04-21 DIAGNOSIS — Z79.4 TYPE 2 DIABETES MELLITUS WITH STABLE PROLIFERATIVE RETINOPATHY, WITH LONG-TERM CURRENT USE OF INSULIN, UNSPECIFIED LATERALITY (H): ICD-10-CM

## 2021-04-21 DIAGNOSIS — E11.40 TYPE 2 DIABETES MELLITUS WITH DIABETIC NEUROPATHY, WITH LONG-TERM CURRENT USE OF INSULIN (H): ICD-10-CM

## 2021-04-21 DIAGNOSIS — E11.3559 TYPE 2 DIABETES MELLITUS WITH STABLE PROLIFERATIVE RETINOPATHY, WITH LONG-TERM CURRENT USE OF INSULIN, UNSPECIFIED LATERALITY (H): ICD-10-CM

## 2021-04-21 NOTE — TELEPHONE ENCOUNTER
Patient due for physical  No future appt scheduled    Routing to [facility] to assist in scheduling      Rosalind Lu RN  [facility]

## 2021-04-26 RX ORDER — ROSUVASTATIN CALCIUM 20 MG/1
TABLET, COATED ORAL
Qty: 90 TABLET | Refills: 0 | Status: SHIPPED | OUTPATIENT
Start: 2021-04-26 | End: 2021-05-07

## 2021-04-26 NOTE — TELEPHONE ENCOUNTER
Medication is being filled for 1 time refill only due to:  Patient needs to be seen because due for OV.    Rosalind Lu RN  [facility]

## 2021-04-30 ENCOUNTER — IMMUNIZATION (OUTPATIENT)
Dept: NURSING | Facility: CLINIC | Age: 51
End: 2021-04-30
Attending: INTERNAL MEDICINE
Payer: COMMERCIAL

## 2021-04-30 PROCEDURE — 91300 PR COVID VAC PFIZER DIL RECON 30 MCG/0.3 ML IM: CPT

## 2021-04-30 PROCEDURE — 0002A PR COVID VAC PFIZER DIL RECON 30 MCG/0.3 ML IM: CPT

## 2021-05-07 ENCOUNTER — VIRTUAL VISIT (OUTPATIENT)
Dept: FAMILY MEDICINE | Facility: CLINIC | Age: 51
End: 2021-05-07
Payer: COMMERCIAL

## 2021-05-07 DIAGNOSIS — E11.3559 TYPE 2 DIABETES MELLITUS WITH STABLE PROLIFERATIVE RETINOPATHY, WITH LONG-TERM CURRENT USE OF INSULIN, UNSPECIFIED LATERALITY (H): ICD-10-CM

## 2021-05-07 DIAGNOSIS — Z51.81 MEDICATION MONITORING ENCOUNTER: ICD-10-CM

## 2021-05-07 DIAGNOSIS — E11.21 TYPE 2 DIABETES MELLITUS WITH DIABETIC NEPHROPATHY, WITH LONG-TERM CURRENT USE OF INSULIN (H): Primary | ICD-10-CM

## 2021-05-07 DIAGNOSIS — Z79.4 TYPE 2 DIABETES MELLITUS WITH DIABETIC NEPHROPATHY, WITH LONG-TERM CURRENT USE OF INSULIN (H): Primary | ICD-10-CM

## 2021-05-07 DIAGNOSIS — N18.31 STAGE 3A CHRONIC KIDNEY DISEASE (H): ICD-10-CM

## 2021-05-07 DIAGNOSIS — E78.5 HYPERLIPIDEMIA WITH TARGET LDL LESS THAN 70: ICD-10-CM

## 2021-05-07 DIAGNOSIS — E11.40 TYPE 2 DIABETES MELLITUS WITH DIABETIC NEUROPATHY, WITH LONG-TERM CURRENT USE OF INSULIN (H): ICD-10-CM

## 2021-05-07 DIAGNOSIS — Z79.4 TYPE 2 DIABETES MELLITUS WITH STABLE PROLIFERATIVE RETINOPATHY, WITH LONG-TERM CURRENT USE OF INSULIN, UNSPECIFIED LATERALITY (H): ICD-10-CM

## 2021-05-07 DIAGNOSIS — Z79.4 TYPE 2 DIABETES MELLITUS WITH DIABETIC NEUROPATHY, WITH LONG-TERM CURRENT USE OF INSULIN (H): ICD-10-CM

## 2021-05-07 DIAGNOSIS — K21.9 GASTROESOPHAGEAL REFLUX DISEASE WITHOUT ESOPHAGITIS: ICD-10-CM

## 2021-05-07 DIAGNOSIS — F51.01 PRIMARY INSOMNIA: ICD-10-CM

## 2021-05-07 DIAGNOSIS — E11.9 TYPE 2 DIABETES, HBA1C GOAL < 7% (H): ICD-10-CM

## 2021-05-07 DIAGNOSIS — N52.9 ERECTILE DYSFUNCTION, UNSPECIFIED ERECTILE DYSFUNCTION TYPE: ICD-10-CM

## 2021-05-07 DIAGNOSIS — R45.86 MOOD CHANGES: ICD-10-CM

## 2021-05-07 DIAGNOSIS — I10 HYPERTENSION GOAL BP (BLOOD PRESSURE) < 140/90: ICD-10-CM

## 2021-05-07 PROCEDURE — 99214 OFFICE O/P EST MOD 30 MIN: CPT | Mod: GT | Performed by: FAMILY MEDICINE

## 2021-05-07 RX ORDER — LOSARTAN POTASSIUM 100 MG/1
100 TABLET ORAL DAILY
Qty: 90 TABLET | Refills: 3 | Status: SHIPPED | OUTPATIENT
Start: 2021-05-07 | End: 2022-01-01

## 2021-05-07 RX ORDER — AMLODIPINE BESYLATE 5 MG/1
5 TABLET ORAL DAILY
Qty: 90 TABLET | Refills: 3 | Status: SHIPPED | OUTPATIENT
Start: 2021-05-07 | End: 2021-01-01

## 2021-05-07 RX ORDER — GLIMEPIRIDE 4 MG/1
TABLET ORAL
Qty: 180 TABLET | Refills: 3 | Status: SHIPPED | OUTPATIENT
Start: 2021-05-07 | End: 2022-01-01

## 2021-05-07 RX ORDER — ROSUVASTATIN CALCIUM 20 MG/1
20 TABLET, COATED ORAL DAILY
Qty: 90 TABLET | Refills: 3 | Status: SHIPPED | OUTPATIENT
Start: 2021-05-07 | End: 2022-01-01

## 2021-05-07 RX ORDER — SERTRALINE HYDROCHLORIDE 100 MG/1
200 TABLET, FILM COATED ORAL DAILY
Qty: 180 TABLET | Refills: 3 | Status: SHIPPED | OUTPATIENT
Start: 2021-05-07 | End: 2022-01-01

## 2021-05-07 RX ORDER — TRAZODONE HYDROCHLORIDE 50 MG/1
25-50 TABLET, FILM COATED ORAL AT BEDTIME
Qty: 90 TABLET | Refills: 3 | Status: SHIPPED | OUTPATIENT
Start: 2021-05-07 | End: 2022-01-01

## 2021-05-07 RX ORDER — OMEPRAZOLE 40 MG/1
40 CAPSULE, DELAYED RELEASE ORAL DAILY
Qty: 90 CAPSULE | Refills: 3 | Status: SHIPPED | OUTPATIENT
Start: 2021-05-07 | End: 2022-01-01

## 2021-05-07 RX ORDER — INSULIN LISPRO 200 [IU]/ML
INJECTION, SOLUTION SUBCUTANEOUS
Qty: 27 ML | Refills: 11 | Status: SHIPPED | OUTPATIENT
Start: 2021-05-07 | End: 2022-01-01

## 2021-05-07 RX ORDER — SILDENAFIL 100 MG/1
50-100 TABLET, FILM COATED ORAL DAILY PRN
Qty: 18 TABLET | Refills: 3 | Status: SHIPPED | OUTPATIENT
Start: 2021-05-07 | End: 2022-01-01

## 2021-05-07 NOTE — PROGRESS NOTES
Braden is a 51 year old who is being evaluated via a billable video visit.  [phone]    How would you like to obtain your AVS? MyChart  If the video visit is dropped, the invitation should be resent by: Text to cell phone: [phone]  Will anyone else be joining your video visit? No      Video Start Time: 12:48 PM    Assessment & Plan     ICD-10-CM    1. Type 2 diabetes mellitus with diabetic nephropathy, with long-term current use of insulin (H)  E11.21 rosuvastatin (CRESTOR) 20 MG tablet    Z79.4    2. Type 2 diabetes mellitus with diabetic neuropathy, with long-term current use of insulin (H)  E11.40 glimepiride (AMARYL) 4 MG tablet    Z79.4 Insulin Lispro (HUMALOG KWIKPEN) 200 UNIT/ML soln     rosuvastatin (CRESTOR) 20 MG tablet   3. Type 2 diabetes mellitus with stable proliferative retinopathy, with long-term current use of insulin, unspecified laterality (H)  E11.3559 glimepiride (AMARYL) 4 MG tablet    Z79.4 insulin glargine (LANTUS PEN) 100 UNIT/ML pen     Insulin Lispro (HUMALOG KWIKPEN) 200 UNIT/ML soln     rosuvastatin (CRESTOR) 20 MG tablet   4. Type 2 diabetes, HbA1c goal < 7% (H)  E11.9    5. Stage 3a chronic kidney disease  N18.31    6. Hypertension goal BP (blood pressure) < 140/90  I10    7. Hyperlipidemia with target LDL less than 70  E78.5 rosuvastatin (CRESTOR) 20 MG tablet   8. Gastroesophageal reflux disease without esophagitis  K21.9 omeprazole (PRILOSEC) 40 MG DR capsule   9. Mood changes  R45.86 sertraline (ZOLOFT) 100 MG tablet   10. Primary insomnia  F51.01 traZODone (DESYREL) 50 MG tablet   11. Erectile dysfunction, unspecified erectile dysfunction type  N52.9 sildenafil (VIAGRA) 100 MG tablet   12. Medication monitoring encounter  Z51.81        Discussed treatment/modality options, including risk and benefits, Braden ORDOÑEZ Moises desires:    1) insurance changed, no longer covering victoza, patient will check on other GLP-1 coverage    2) med refills    3) lab refills    4) trial of  trazodone/melatonin    5) foot/ankle exercises sent    6) cpx fasting soon    7) Dr Bustos    All diagnosis above reviewed and noted above, otherwise stable.      See [facility] orders for further details.      Return in about 3 months (around 8/7/2021), or if symptoms worsen or fail to improve, for Medication Recheck Visit, Follow Up Chronic, BP Recheck, Lab Work.    No LOS data to display    Doing chart review, history and exam, documentation and further activities as noted.           Pepe Spear MD, FAAFP     [facility]  [address] [place] [id number]  [email or web address]   Office: [phone]  Fax: [phone]  Pager: [phone]       Ana Lilia Feliciano is a 51 year old who presents for the following health issues     Diabetes Follow-up - Dr Bustos    How often are you checking your blood sugar? Three times daily  Blood sugar testing frequency justification:  Uncontrolled diabetes  What time of day are you checking your blood sugars (select all that apply)?  Before and after meals  Have you had any blood sugars above 200?  Yes   Have you had any blood sugars below 70?  Yes     What symptoms do you notice when your blood sugar is low?  Shaky and Blurred vision    What concerns do you have today about your diabetes? None     Do you have any of these symptoms? (Select all that apply)  Burning in feet    Range 40 to 245  Average am 145ish    Lab Results   Component Value Date    A1C 8.5 04/07/2021    A1C 7.4 11/18/2019    A1C 7.4 06/25/2019    A1C 6.4 02/26/2019    A1C 6.5 10/09/2018     Was reffing soccer    Now working second shift - tired a lot - difficulty getting to sleep in am, getting max 6 hrs per day    Bilateral ankles tight and tender            Hyperlipidemia Follow-Up      Are you regularly taking any medication or supplement to lower your cholesterol?   Yes- crestor    Are you having muscle aches or other  side effects that you think could be caused by your cholesterol lowering medication?  No    Recent Labs   Lab Test 04/07/21  0948 06/25/19  0932 05/12/15  1040 05/12/15  1040 09/09/14  1017   CHOL 147 133   < > 167 174   HDL 60 44   < > 44 41   LDL 59 57   < > 97 86   TRIG 140 162*   < > 129 235*   CHOLHDLRATIO  --   --   --  3.8 4.2    < > = values in this interval not displayed.       Hypertension Follow-up      Do you check your blood pressure regularly outside of the clinic? No     Are you following a low salt diet? No    Are your blood pressures ever more than 140 on the top number (systolic) OR more   than 90 on the bottom number (diastolic), for example 140/90? No    BP Readings from Last 3 Encounters:   04/07/21 118/74   03/21/20 (!) 140/72   11/18/19 (!) 165/86     Creatinine   Date Value Ref Range Status   04/07/2021 1.92 (H) 0.66 - 1.25 mg/dL Final     GFR Estimate   Date Value Ref Range Status   04/07/2021 39 (L) >60 mL/min/[1.73_m2] Final     Comment:     Non  GFR Calc  Starting 12/18/2018, serum creatinine based estimated GFR (eGFR) will be   calculated using the Chronic Kidney Disease Epidemiology Collaboration   (CKD-EPI) equation.           How many servings of fruits and vegetables do you eat daily?  2-3    On average, how many sweetened beverages do you drink each day (Examples: soda, juice, sweet tea, etc.  Do NOT count diet or artificially sweetened beverages)?   0    How many days per week do you exercise enough to make your heart beat faster? none    How many minutes a day do you exercise enough to make your heart beat faster? none  How many days per week do you miss taking your medication? 1    What makes it hard for you to take your medications?  remembering to take    Review of Systems   CONSTITUTIONAL: NEGATIVE for fever, chills, change in weight  INTEGUMENTARY/SKIN: NEGATIVE for worrisome rashes, moles or lesions  EYES: NEGATIVE for vision changes or irritation  ENT/MOUTH:  NEGATIVE for ear, mouth and throat problems  RESP: NEGATIVE for significant cough or SOB  CV: NEGATIVE for chest pain, palpitations or peripheral edema  GI: NEGATIVE for nausea, abdominal pain, heartburn, or change in bowel habits  : NEGATIVE for frequency, dysuria, or hematuria  MUSCULOSKELETAL: NEGATIVE for significant arthralgias or myalgia  NEURO: NEGATIVE for weakness, dizziness or paresthesias  ENDOCRINE: NEGATIVE for temperature intolerance, skin/hair changes  HEME: NEGATIVE for bleeding problems  PSYCHIATRIC: NEGATIVE for changes in mood or affect      Objective           Vitals:  No vitals were obtained today due to virtual visit.    Physical Exam   GENERAL: Healthy, alert and no distress  EYES: Eyes grossly normal to inspection.  No discharge or erythema, or obvious scleral/conjunctival abnormalities.  RESP: No audible wheeze, cough, or visible cyanosis.  No visible retractions or increased work of breathing.    SKIN: Visible skin clear. No significant rash, abnormal pigmentation or lesions.  NEURO: Cranial nerves grossly intact.  Mentation and speech appropriate for age.  PSYCH: Mentation appears normal, affect normal/bright, judgement and insight intact, normal speech and appearance well-groomed.    Reviewed recent labs    Video-Visit Details    Type of service:  Video Visit    Video End Time:1:20 pm    Originating Location (pt. Location): Home    Distant Location (provider location):  [facility]     Platform used for Video Visit: [employer]

## 2021-05-09 ENCOUNTER — HEALTH MAINTENANCE LETTER (OUTPATIENT)
Age: 51
End: 2021-05-09

## 2021-06-19 ENCOUNTER — TELEPHONE (OUTPATIENT)
Dept: ENDOCRINOLOGY | Facility: CLINIC | Age: 51
End: 2021-06-19

## 2021-06-19 DIAGNOSIS — Z79.4 TYPE 2 DIABETES MELLITUS WITH STABLE PROLIFERATIVE RETINOPATHY, WITH LONG-TERM CURRENT USE OF INSULIN, UNSPECIFIED LATERALITY (H): ICD-10-CM

## 2021-06-19 DIAGNOSIS — E11.3559 TYPE 2 DIABETES MELLITUS WITH STABLE PROLIFERATIVE RETINOPATHY, WITH LONG-TERM CURRENT USE OF INSULIN, UNSPECIFIED LATERALITY (H): ICD-10-CM

## 2021-06-22 RX ORDER — FLASH GLUCOSE SENSOR
KIT MISCELLANEOUS
Qty: 6 EACH | Refills: 0 | OUTPATIENT
Start: 2021-06-22

## 2021-06-23 RX ORDER — FLASH GLUCOSE SENSOR
KIT MISCELLANEOUS
Qty: 4 EACH | Refills: 0 | Status: SHIPPED | OUTPATIENT
Start: 2021-06-23 | End: 2021-01-01

## 2021-07-26 NOTE — LETTER
7/26/2021         RE: Braden De Leon  [address] [place] [address]        Dear Colleague,    Thank you for referring your patient, Braden De Leon, to the [facility]. Please see a copy of my visit note below.    Patient is being evaluated via a billable video visit.      How would you like to obtain your AVS? Reviewed verbally  If the video visit is dropped, the invitation should be resent by cell phone  Will anyone else be joining your video visit? no      Video Start Time:  8:07 am    Video-Visit Details    Type of service:  Video Visit    Video End Time:  8:25 am    Originating Location (pt. Location): home    Distant Location (provider location):  [facility]/home    Platform used for Video Visit:  [employer]        Recent issues:  Diabetes follow-up evaluation, last visit 5/2020  Had hypoglycemia event 6/2021  Patient reports feeling well overall, no new health problems reported           1997. Diagnosis of T2DM  Initial treatment with Glucophage x several months, but developed GI symptoms and discontinued  Changed to Glucotrol, then addition of Avandia  3/2005. Saw Dr. СЕРГЕЙ Luevano/Endocrinology  Previous hgbA1c's 8.4-9.1% range  Subsequent addition of basal insulin as QD... Then BID dosing routine  Has used Lantus, Levemir, then Tresiba (QD) and then Levemir.  Tresiba non formulary  5/2019. Stopped Victoza, due to higher expense     Current DM meds:  Levemir                       54 units morning and 54 units evening  Humalog U200            40 units breakfast     20 units lunch     20 units supper     Humalog sscale          2U per 50>150   glimeparide 4 mg        2-tabs in evening     Using Accucheck ? Meter, uses occasionally  12/2017 Began use of Freestyle Armando Personal CGMS device, likes it  Using [employer] CGMS sensor with [place]   Reports good overall sensor trends   Some post-supper hyperglycemia   Recent 7d avg 140  mg/dl    Previous [facility] labs include:  Lab Results   Component Value Date    A1C 8.5 (H) 04/07/2021     04/07/2021    POTASSIUM 4.2 04/07/2021    CHLORIDE 112 (H) 04/07/2021    CO2 22 04/07/2021    ANIONGAP 6 04/07/2021    GLC 95 04/07/2021    BUN 39 (H) 04/07/2021    CR 1.92 (H) 04/07/2021    GFRESTIMATED 39 (L) 04/07/2021    GFRESTBLACK 45 (L) 04/07/2021    SILVIANO 9.3 04/07/2021    CHOL 147 04/07/2021    TRIG 140 04/07/2021    HDL 60 04/07/2021    LDL 59 04/07/2021    NHDL 87 04/07/2021    UCRR 202 11/18/2019    MICROL >6,800 11/18/2019    UMALCR 3,366.34 (H) 11/18/2019     DM Complications:              Retinopathy                          Previous laser PC? and Avastin eye injections OU                          Sees Dr. BRIE Zuñiga/VitreoRetinal Surgery              Nephropathy                          Microalbuminuria                          Taking losartan daily (and lisinopril discontinued Fall '19)              Neuropathy                          Decreased sensation vs numbness at feet        , wife Lela.  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 MAI Spear/ [facility] for gen med evaluations      ROS: 10 point ROS neg other than the symptoms noted above in the HPI.     GENERAL: mild fatigue; wt stable; denies fevers, chills, night sweats.   HEENT: reports good vision acuity (but not today w/ eye patches), no dysphagia, odonophagia, diplopia  THYROID:  no apparent hyper or hypothyroid symptoms  CV: no chest pain, pressure, palpitations, skipped beats  LUNGS: no SOB, LEAVITT, cough, wheezing   ABDOMEN: no diarrhea, constipation, abdominal pain  EXTREMITIES: no rashes, ulcers, edema  NEUROLOGY: decreased sensation at feet at nighttime; no headaches, denies changes in vision,   MSK: some arthralgias at knees, ankles, back; no apparent weakness  SKIN: no rashes or lesions  PSYCH:  stable mood, no significant anxiety or depression  ENDOCRINE: no heat or cold  intolerance     Physical Exam (visual exam)  VS:  no vital signs taken for video visit  CONSTITUTIONAL: healthy, alert and NAD, well dressed, answering questions appropriately  ENT: no nose swelling or nasal discharge, mouth redness or gum changes.  EYES: eyes grossly normal to inspection, conjunctivae and sclerae normal, no exophthalmos or proptosis  THYROID:  no apparent nodules or goiter  LUNGS: no audible wheeze, cough or visible cyanosis, no visible retractions or increased work of breathing  ABDOMEN: abdomen not evaluated  EXTREMITIES: no hand tremors, limited exam  NEUROLOGY: CN grossly intact, mentation intact and speech normal   SKIN:  no apparent skin lesions, rash, or edema with visualized skin appearance  PSYCH: mentation appears normal, affect normal/bright, judgement and insight intact,   normal speech and appearance well groomed    LABS:     All pertinent notes, labs, and images personally reviewed by me.        A/P:  Encounter Diagnoses   Name Primary?     Type 2 diabetes mellitus with stable proliferative retinopathy, with long-term current use of insulin, unspecified laterality (H) Yes     Type 2 diabetes mellitus with diabetic nephropathy, with long-term current use of insulin (H)      Type 2 diabetes mellitus with diabetic neuropathy, with long-term current use of insulin (H)        Comments:  Reviewed health history and diabetes issues.  Difficult to assess glycemic control without glucose trend data  Reviewed and interpreted tests that I previously ordered.   Ordered appropriate tests for the endocrinology disease management.    Management options discussed and implemented after shared medical decision making with the patient.  T2DM problem is chronic-stable     Plan:  Discussed general issues with the diabetes mellitus diagnosis and management  Reviewed the [id number] test which reflects previous overall glucose levels or control  Discussed the importance of blood glucose (BG) vs CGMS  sensor glucose testing to assess glucose trends  Provided general overview of diabetes (oral and injectable) medication use  Reviewed use of the Freestyle Armando CGMS system     Recommend:  Continue current Levemir, Humalog, and glimeparide med plan  Continue use of Armando CGMS sensor  Updated his Armando sensor and pen needle Rx's today  Plan fasting lab appt soon   Discussed option of the  AV clinic    Lab orders placed  If hgbA1c high/elevated, will need more focused diabetes and CGM data evaluation, CDE appt  Keep focus on diet, exercise, weight management  Needs close followup of blood pressure to confirm ideal control  Advise having fasting lipid panel testing and dilated eye examination, at least annually  Keep regular followup ophthalmology evaluations also  He will complete his MNDMV form and send it to our office by [employer] or drop it off soon     Addressed patient questions today     There are no Patient Instructions on file for this visit.    Future labs ordered today:   Orders Placed This Encounter   Procedures     Hemoglobin A1c     Basic metabolic panel     ALT     Albumin Random Urine Quantitative with Creat Ratio     Radiology/Consults ordered today: None    Total time spent in with the patient evaluation:  17 min  Additional time spent reviewing pertinent lab tests and chart notes, and documentation:  5 min    Follow-up:  10/2021    HANK Bustos MD, MS  Endocrinology  [facility]                    Again, thank you for allowing me to participate in the care of your patient.        Sincerely,        Vic Bustos MD

## 2021-07-26 NOTE — PROGRESS NOTES
Patient is being evaluated via a billable video visit.      How would you like to obtain your AVS? Reviewed verbally  If the video visit is dropped, the invitation should be resent by cell phone  Will anyone else be joining your video visit? no      Video Start Time:  8:07 am    Video-Visit Details    Type of service:  Video Visit    Video End Time:  8:25 am    Originating Location (pt. Location): home    Distant Location (provider location):  [facility]/[place]    Platform used for Video Visit:  [employer]        Recent issues:  Diabetes follow-up evaluation, last visit 5/2020  Had hypoglycemia event 6/2021  Patient reports feeling well overall, no new health problems reported           1997. Diagnosis of T2DM  Initial treatment with Glucophage x several months, but developed GI symptoms and discontinued  Changed to Glucotrol, then addition of Avandia  3/2005. Saw Dr. СЕРГЕЙ Luevano/Endocrinology  Previous hgbA1c's 8.4-9.1% range  Subsequent addition of basal insulin as QD... Then BID dosing routine  Has used Lantus, Levemir, then Tresiba (QD) and then Levemir.  Tresiba non formulary  5/2019. Stopped Victoza, due to higher expense     Current DM meds:  Levemir                       54 units morning and 54 units evening  Humalog U200            40 units breakfast     20 units lunch     20 units supper     Humalog sscale          2U per 50>150   glimeparide 4 mg        2-tabs in evening     Using Accucheck ? Meter, uses occasionally  12/2017 Began use of Freestyle Armando Personal CGMS device, likes it  Using [employer] CGMS sensor with [place]   Reports good overall sensor trends   Some post-supper hyperglycemia   Recent 7d avg 140 mg/dl    Previous [facility] labs include:  Lab Results   Component Value Date    A1C 8.5 (H) 04/07/2021     04/07/2021    POTASSIUM 4.2 04/07/2021    CHLORIDE 112 (H) 04/07/2021    CO2 22 04/07/2021    ANIONGAP 6 04/07/2021    GLC 95 04/07/2021    BUN 39 (H) 04/07/2021     CR 1.92 (H) 04/07/2021    GFRESTIMATED 39 (L) 04/07/2021    GFRESTBLACK 45 (L) 04/07/2021    SILVIANO 9.3 04/07/2021    CHOL 147 04/07/2021    TRIG 140 04/07/2021    HDL 60 04/07/2021    LDL 59 04/07/2021    NHDL 87 04/07/2021    UCRR 202 11/18/2019    MICROL >6,800 11/18/2019    UMALCR 3,366.34 (H) 11/18/2019     DM Complications:              Retinopathy                          Previous laser PC? and Avastin eye injections OU                          Sees Dr. BRIE Zuñiga/VitreoRetinal Surgery              Nephropathy                          Microalbuminuria                          Taking losartan daily (and lisinopril discontinued Fall '19)              Neuropathy                          Decreased sensation vs numbness at feet        , wife Lela.  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 MAI Spear/ [facility] for gen med evaluations      ROS: 10 point ROS neg other than the symptoms noted above in the HPI.     GENERAL: mild fatigue; wt stable; denies fevers, chills, night sweats.   HEENT: reports good vision acuity (but not today w/ eye patches), no dysphagia, odonophagia, diplopia  THYROID:  no apparent hyper or hypothyroid symptoms  CV: no chest pain, pressure, palpitations, skipped beats  LUNGS: no SOB, LEAVITT, cough, wheezing   ABDOMEN: no diarrhea, constipation, abdominal pain  EXTREMITIES: no rashes, ulcers, edema  NEUROLOGY: decreased sensation at feet at nighttime; no headaches, denies changes in vision,   MSK: some arthralgias at knees, ankles, back; no apparent weakness  SKIN: no rashes or lesions  PSYCH:  stable mood, no significant anxiety or depression  ENDOCRINE: no heat or cold intolerance     Physical Exam (visual exam)  VS:  no vital signs taken for video visit  CONSTITUTIONAL: healthy, alert and NAD, well dressed, answering questions appropriately  ENT: no nose swelling or nasal discharge, mouth redness or gum changes.  EYES: eyes grossly normal to inspection,  conjunctivae and sclerae normal, no exophthalmos or proptosis  THYROID:  no apparent nodules or goiter  LUNGS: no audible wheeze, cough or visible cyanosis, no visible retractions or increased work of breathing  ABDOMEN: abdomen not evaluated  EXTREMITIES: no hand tremors, limited exam  NEUROLOGY: CN grossly intact, mentation intact and speech normal   SKIN:  no apparent skin lesions, rash, or edema with visualized skin appearance  PSYCH: mentation appears normal, affect normal/bright, judgement and insight intact,   normal speech and appearance well groomed    LABS:     All pertinent notes, labs, and images personally reviewed by me.        A/P:  Encounter Diagnoses   Name Primary?     Type 2 diabetes mellitus with stable proliferative retinopathy, with long-term current use of insulin, unspecified laterality (H) Yes     Type 2 diabetes mellitus with diabetic nephropathy, with long-term current use of insulin (H)      Type 2 diabetes mellitus with diabetic neuropathy, with long-term current use of insulin (H)        Comments:  Reviewed health history and diabetes issues.  Difficult to assess glycemic control without glucose trend data  Reviewed and interpreted tests that I previously ordered.   Ordered appropriate tests for the endocrinology disease management.    Management options discussed and implemented after shared medical decision making with the patient.  T2DM problem is chronic-stable     Plan:  Discussed general issues with the diabetes mellitus diagnosis and management  Reviewed the [id number] test which reflects previous overall glucose levels or control  Discussed the importance of blood glucose (BG) vs CGMS sensor glucose testing to assess glucose trends  Provided general overview of diabetes (oral and injectable) medication use  Reviewed use of the Freestyle Armando CGMS system     Recommend:  Continue current Levemir, Humalog, and glimeparide med plan  Continue use of Armando CGMS sensor  Updated his  Armando sensor and pen needle Rx's today  Plan fasting lab appt soon   Discussed option of the [facility]    Lab orders placed  If hgbA1c high/elevated, will need more focused diabetes and CGM data evaluation, CDE appt  Keep focus on diet, exercise, weight management  Needs close followup of blood pressure to confirm ideal control  Advise having fasting lipid panel testing and dilated eye examination, at least annually  Keep regular followup ophthalmology evaluations also  He will complete his MNDMV form and send it to our office by [employer] or drop it off soon     Addressed patient questions today     There are no Patient Instructions on file for this visit.    Future labs ordered today:   Orders Placed This Encounter   Procedures     Hemoglobin A1c     Basic metabolic panel     ALT     Albumin Random Urine Quantitative with Creat Ratio     Radiology/Consults ordered today: None    Total time spent in with the patient evaluation:  17 min  Additional time spent reviewing pertinent lab tests and chart notes, and documentation:  5 min    Follow-up:  10/2021    HANK Bustos MD, MS  Endocrinology  [facility]

## 2021-08-12 NOTE — PATIENT INSTRUCTIONS
Ask your wife to listen if you are snoring or your breathing stops for periods of time at night. Consider scheduling a sleep study.         [facility] [place]                        To reach your care team during and after hours:   [phone]  To reach our pharmacy:        [phone]    Clinic Hours                        Our clinic hours are:    Monday   7:30 am to 7:00 pm                  Tuesday through Friday 7:30 am to 5:00 pm                             Saturday   8:00 am to 12:00 pm      Sunday   Closed      Pharmacy Hours                        Our pharmacy hours are:    Monday   8:30 am to 7:00 pm       Tuesday to Friday  8:30 am to 6:00 pm                       Saturday    9:00 am to 1:00 pm              Sunday    Closed              There is also information available at our web site:  [email or web address]    If your provider ordered any lab tests and you do not receive the results within 10 business days, please call the clinic.    If you need a medication refill please contact your pharmacy.  Please allow 2-3 business days for your refill to be completed.    Our clinic offers telephone visits and e visits.  Please ask one of your team members to explain more.      Use [employer] (secure email communication and access to your chart) to send your primary care provider a message or make an appointment. Ask someone on your Team how to sign up for [employer].  Immunizations                      Immunization History   Administered Date(s) Administered     TD (ADULT, 7+) 04/01/2001        Health Maintenance                         Health Maintenance Due   Topic Date Due     Pneumovax Vaccine  03/18/1970     Diabetic Foot Exam - yearly  02/16/2017     Basic Metabolic Lab - yearly  01/10/2018     Microalbumin Lab - yearly  01/10/2018     Prostate Test (PSA) - yearly  01/10/2018     Wellness Visit with your Primary Provider - yearly  01/10/2018     A1C (Diabetes) Lab - every 6 months  01/18/2018        Gabapentin Pregnancy And Lactation Text: This medication is Pregnancy Category C and isn't considered safe during pregnancy. It is excreted in breast milk.

## 2021-12-06 NOTE — PROGRESS NOTES
Assessment & Plan   Right shoulder pain, unspecified chronicity  / Pain of right clavicle   / Trapezius muscle spasm / Rhomboid pain  Ongoing symptoms after trial of muscle relaxers, recommended stretching exercises range of motion, massage, heat, formal physical therapy referral and will try different muscle relaxant (cyclobenzaprine.) Due to significant pain will give short amount of pain medicine.  - XR Shoulder Right G/E 3 Views  - Physical Therapy Referral  - cyclobenzaprine (FLEXERIL) 5 MG tablet  Dispense: 20 tablet; Refill: 1  - HYDROcodone-acetaminophen (NORCO) 5-325 MG tablet  Dispense: 12 tablet; Refill: 0      Hypertension goal BP (blood pressure) < 140/90  Possibly pain related or other. Due for refill antihypertensive med and will continue  - amLODIPine (NORVASC) 5 MG tablet  Dispense: 90 tablet; Refill: 1    Type 2 diabetes mellitus with stable proliferative retinopathy, with long-term current use of insulin, unspecified laterality (H)  / Type 2 diabetes, HbA1c goal < 7% (H)  Stable - continue medication(s).  - Hemoglobin A1c  - Basic metabolic panel  - ALT  - Albumin Random Urine Quantitative with Creat Ratio      BMI:   Estimated body mass index is 32.27 kg/m  as calculated from the following:    Height as of this encounter: 1.829 m (6').    Weight as of this encounter: 107.9 kg (237 lb 14.4 oz).   Weight management plan: Discussed healthy diet and exercise guidelines      Return in about 1 week (around 12/13/2021) for blood pressure recheck at  clinic.      Az Rangel MD      [address] [address]  [email or web address]   Office: [phone]       Ana Lilia Feliciano is a 51 year old who presents for the following health issues     Right shoulder, trapezius and rhomboid trigger point tenderness pain x 1 month    - had virtual visit prescribed methocarbamol 500 mg and diclofenac 50 mg- which gave patient nausea then prescribed meloxicam 15 mg-  neither of the pain meds helped with the shoulder pain-     patient  has nauseous- prescribed ondansetron 8 mg- pain right now 7/10 can go to 10/10   No known injury - can not sleep.  No acidity - on omeprazole.    Not sleeping and exhausted.    More stress lately.     Diabetes stable with no changes in treatment.    Hypertension-tolerating meds and without side effects or headaches      Review of Systems   Constitutional, HEENT, cardiovascular, pulmonary, gi and gu systems are negative, except as otherwise noted.      Objective    BP (!) 162/88 (BP Location: Left arm)   Pulse 84   Temp 98.4  F (36.9  C) (Tympanic)   Ht 1.829 m (6')   Wt 107.9 kg (237 lb 14.4 oz)   SpO2 99%   BMI 32.27 kg/m    Body mass index is 32.27 kg/m .  Physical Exam   GENERAL: healthy, alert and no distress  HENT: ear canals and TM's normal, nose and mouth without ulcers or lesions  NECK: no adenopathy, no asymmetry, masses, or scars and thyroid normal to palpation  RESP: lungs clear to auscultation - no rales, rhonchi or wheezes  CV: regular rate and rhythm, normal S1 S2, no S3 or S4, no murmur, click or rub, no peripheral edema and peripheral pulses strong  ABDOMEN: soft, nontender, no hepatosplenomegaly, no masses and bowel sounds normal  MS: no gross musculoskeletal defects noted, no edema  SKIN: no suspicious lesions or rashes  NEURO: Normal strength and tone, mentation intact and speech normal  BACK: no CVA tenderness, no paralumbar tenderness  PSYCH: mentation appears normal, affect normal/bright  LYMPH: no cervical, supraclavicular, axillary, or inguinal adenopathy    Xray - Reviewed and interpreted by me. Right shoulder x-ray without bony deformity or signs of arthritis.

## 2021-12-14 NOTE — PROGRESS NOTES
Braden De Leon is a 51 year old patient who comes in today for a Blood Pressure check.  Initial BP:  /84 (BP Location: Right arm, Patient Position: Chair, Cuff Size: Adult Regular)      Data Unavailable  Disposition: follow-up as previously indicated by provider

## 2022-01-01 ENCOUNTER — HOSPITAL ENCOUNTER (EMERGENCY)
Facility: CLINIC | Age: 52
Discharge: HOME | End: 2022-05-16
Attending: PHYSICIAN ASSISTANT | Admitting: PHYSICIAN ASSISTANT
Payer: COMMERCIAL

## 2022-01-01 ENCOUNTER — PATIENT OUTREACH (OUTPATIENT)
Dept: CARE COORDINATION | Facility: CLINIC | Age: 52
End: 2022-01-01
Payer: COMMERCIAL

## 2022-01-01 ENCOUNTER — TRANSFERRED RECORDS (OUTPATIENT)
Dept: HEALTH INFORMATION MANAGEMENT | Facility: CLINIC | Age: 52
End: 2022-01-01
Payer: COMMERCIAL

## 2022-01-01 ENCOUNTER — TRANSFERRED RECORDS (OUTPATIENT)
Dept: HEALTH INFORMATION MANAGEMENT | Facility: CLINIC | Age: 52
End: 2022-01-01

## 2022-01-01 ENCOUNTER — TELEPHONE (OUTPATIENT)
Dept: FAMILY MEDICINE | Facility: CLINIC | Age: 52
End: 2022-01-01
Payer: COMMERCIAL

## 2022-01-01 ENCOUNTER — TRANSFERRED RECORDS (OUTPATIENT)
Dept: MULTI SPECIALTY CLINIC | Facility: CLINIC | Age: 52
End: 2022-01-01
Payer: COMMERCIAL

## 2022-01-01 ENCOUNTER — DOCUMENTATION ONLY (OUTPATIENT)
Dept: LAB | Facility: CLINIC | Age: 52
End: 2022-01-01

## 2022-01-01 ENCOUNTER — TELEPHONE (OUTPATIENT)
Dept: ULTRASOUND IMAGING | Facility: CLINIC | Age: 52
End: 2022-01-01
Payer: COMMERCIAL

## 2022-01-01 ENCOUNTER — APPOINTMENT (OUTPATIENT)
Dept: LAB | Facility: CLINIC | Age: 52
End: 2022-01-01
Payer: COMMERCIAL

## 2022-01-01 ENCOUNTER — MYC MEDICAL ADVICE (OUTPATIENT)
Dept: FAMILY MEDICINE | Facility: CLINIC | Age: 52
End: 2022-01-01
Payer: COMMERCIAL

## 2022-01-01 ENCOUNTER — OFFICE VISIT (OUTPATIENT)
Dept: FAMILY MEDICINE | Facility: CLINIC | Age: 52
End: 2022-01-01
Payer: COMMERCIAL

## 2022-01-01 ENCOUNTER — APPOINTMENT (OUTPATIENT)
Dept: CT IMAGING | Facility: CLINIC | Age: 52
End: 2022-01-01
Attending: PHYSICIAN ASSISTANT
Payer: COMMERCIAL

## 2022-01-01 ENCOUNTER — MYC MEDICAL ADVICE (OUTPATIENT)
Dept: FAMILY MEDICINE | Facility: CLINIC | Age: 52
End: 2022-01-01

## 2022-01-01 ENCOUNTER — APPOINTMENT (OUTPATIENT)
Dept: ULTRASOUND IMAGING | Facility: CLINIC | Age: 52
End: 2022-01-01
Attending: PHYSICIAN ASSISTANT
Payer: COMMERCIAL

## 2022-01-01 ENCOUNTER — VIRTUAL VISIT (OUTPATIENT)
Dept: FAMILY MEDICINE | Facility: CLINIC | Age: 52
End: 2022-01-01
Payer: COMMERCIAL

## 2022-01-01 ENCOUNTER — NURSE TRIAGE (OUTPATIENT)
Dept: FAMILY MEDICINE | Facility: CLINIC | Age: 52
End: 2022-01-01
Payer: COMMERCIAL

## 2022-01-01 ENCOUNTER — HOSPITAL ENCOUNTER (OUTPATIENT)
Dept: ULTRASOUND IMAGING | Facility: CLINIC | Age: 52
Discharge: HOME | End: 2022-05-20
Attending: INTERNAL MEDICINE | Admitting: INTERNAL MEDICINE
Payer: COMMERCIAL

## 2022-01-01 VITALS — HEART RATE: 97 BPM | DIASTOLIC BLOOD PRESSURE: 98 MMHG | OXYGEN SATURATION: 99 % | SYSTOLIC BLOOD PRESSURE: 160 MMHG

## 2022-01-01 VITALS
RESPIRATION RATE: 18 BRPM | SYSTOLIC BLOOD PRESSURE: 175 MMHG | OXYGEN SATURATION: 98 % | DIASTOLIC BLOOD PRESSURE: 105 MMHG | HEART RATE: 87 BPM

## 2022-01-01 VITALS
WEIGHT: 228 LBS | BODY MASS INDEX: 30.88 KG/M2 | DIASTOLIC BLOOD PRESSURE: 82 MMHG | OXYGEN SATURATION: 97 % | TEMPERATURE: 97.4 F | HEIGHT: 72 IN | SYSTOLIC BLOOD PRESSURE: 154 MMHG | HEART RATE: 103 BPM

## 2022-01-01 DIAGNOSIS — Z79.4 TYPE 2 DIABETES MELLITUS WITH STABLE PROLIFERATIVE RETINOPATHY, WITH LONG-TERM CURRENT USE OF INSULIN, UNSPECIFIED LATERALITY (H): ICD-10-CM

## 2022-01-01 DIAGNOSIS — E11.311 DIABETIC MACULAR EDEMA (H): ICD-10-CM

## 2022-01-01 DIAGNOSIS — N18.31 STAGE 3A CHRONIC KIDNEY DISEASE (H): ICD-10-CM

## 2022-01-01 DIAGNOSIS — E11.40 TYPE 2 DIABETES MELLITUS WITH DIABETIC NEUROPATHY, WITH LONG-TERM CURRENT USE OF INSULIN (H): ICD-10-CM

## 2022-01-01 DIAGNOSIS — K76.9 LIVER LESION: ICD-10-CM

## 2022-01-01 DIAGNOSIS — E11.3559 TYPE 2 DIABETES MELLITUS WITH STABLE PROLIFERATIVE RETINOPATHY, WITH LONG-TERM CURRENT USE OF INSULIN, UNSPECIFIED LATERALITY (H): ICD-10-CM

## 2022-01-01 DIAGNOSIS — N20.0 NEPHROLITHIASIS: ICD-10-CM

## 2022-01-01 DIAGNOSIS — Z79.4 TYPE 2 DIABETES MELLITUS WITH DIABETIC NEUROPATHY, WITH LONG-TERM CURRENT USE OF INSULIN (H): ICD-10-CM

## 2022-01-01 DIAGNOSIS — Z11.59 ENCOUNTER FOR SCREENING FOR OTHER VIRAL DISEASES: Primary | ICD-10-CM

## 2022-01-01 DIAGNOSIS — C22.9 ADENOCARCINOMA OF LIVER (H): Primary | ICD-10-CM

## 2022-01-01 DIAGNOSIS — N52.9 ERECTILE DYSFUNCTION, UNSPECIFIED ERECTILE DYSFUNCTION TYPE: ICD-10-CM

## 2022-01-01 DIAGNOSIS — I10 HYPERTENSION GOAL BP (BLOOD PRESSURE) < 140/90: ICD-10-CM

## 2022-01-01 DIAGNOSIS — M79.18 RHOMBOID PAIN: ICD-10-CM

## 2022-01-01 DIAGNOSIS — G43.109 MIGRAINE WITH AURA AND WITHOUT STATUS MIGRAINOSUS, NOT INTRACTABLE: ICD-10-CM

## 2022-01-01 DIAGNOSIS — F51.01 PRIMARY INSOMNIA: ICD-10-CM

## 2022-01-01 DIAGNOSIS — Z51.81 MEDICATION MONITORING ENCOUNTER: ICD-10-CM

## 2022-01-01 DIAGNOSIS — M62.838 TRAPEZIUS MUSCLE SPASM: ICD-10-CM

## 2022-01-01 DIAGNOSIS — K21.9 GASTROESOPHAGEAL REFLUX DISEASE WITHOUT ESOPHAGITIS: ICD-10-CM

## 2022-01-01 DIAGNOSIS — R11.2 NAUSEA AND VOMITING: ICD-10-CM

## 2022-01-01 DIAGNOSIS — Z79.4 TYPE 2 DIABETES MELLITUS WITH STAGE 3A CHRONIC KIDNEY DISEASE, WITH LONG-TERM CURRENT USE OF INSULIN (H): ICD-10-CM

## 2022-01-01 DIAGNOSIS — D12.6 BENIGN NEOPLASM OF COLON, UNSPECIFIED PART OF COLON: ICD-10-CM

## 2022-01-01 DIAGNOSIS — C22.9 ADENOCARCINOMA OF LIVER (H): ICD-10-CM

## 2022-01-01 DIAGNOSIS — E78.5 HYPERLIPIDEMIA WITH TARGET LDL LESS THAN 70: ICD-10-CM

## 2022-01-01 DIAGNOSIS — R25.3 TWITCHING: ICD-10-CM

## 2022-01-01 DIAGNOSIS — E66.811 CLASS 1 OBESITY WITH SERIOUS COMORBIDITY AND BODY MASS INDEX (BMI) OF 30.0 TO 30.9 IN ADULT, UNSPECIFIED OBESITY TYPE: ICD-10-CM

## 2022-01-01 DIAGNOSIS — N28.9 DECREASED RENAL FUNCTION: ICD-10-CM

## 2022-01-01 DIAGNOSIS — Z12.5 SCREENING FOR PROSTATE CANCER: ICD-10-CM

## 2022-01-01 DIAGNOSIS — E11.22 TYPE 2 DIABETES MELLITUS WITH STAGE 3A CHRONIC KIDNEY DISEASE, WITH LONG-TERM CURRENT USE OF INSULIN (H): ICD-10-CM

## 2022-01-01 DIAGNOSIS — Z11.52 ENCOUNTER FOR SCREENING FOR COVID-19: ICD-10-CM

## 2022-01-01 DIAGNOSIS — N18.31 TYPE 2 DIABETES MELLITUS WITH STAGE 3A CHRONIC KIDNEY DISEASE, WITH LONG-TERM CURRENT USE OF INSULIN (H): ICD-10-CM

## 2022-01-01 DIAGNOSIS — R45.86 MOOD CHANGES: ICD-10-CM

## 2022-01-01 DIAGNOSIS — Z12.11 SCREEN FOR COLON CANCER: ICD-10-CM

## 2022-01-01 DIAGNOSIS — Z79.4 TYPE 2 DIABETES MELLITUS WITH DIABETIC NEPHROPATHY, WITH LONG-TERM CURRENT USE OF INSULIN (H): ICD-10-CM

## 2022-01-01 DIAGNOSIS — E11.9 TYPE 2 DIABETES, HBA1C GOAL < 7% (H): ICD-10-CM

## 2022-01-01 DIAGNOSIS — R16.0 LIVER MASS: ICD-10-CM

## 2022-01-01 DIAGNOSIS — K80.20 CHOLELITHIASIS: ICD-10-CM

## 2022-01-01 DIAGNOSIS — E11.21 TYPE 2 DIABETES MELLITUS WITH DIABETIC NEPHROPATHY, WITH LONG-TERM CURRENT USE OF INSULIN (H): ICD-10-CM

## 2022-01-01 DIAGNOSIS — Z00.00 ROUTINE GENERAL MEDICAL EXAMINATION AT A HEALTH CARE FACILITY: Primary | ICD-10-CM

## 2022-01-01 DIAGNOSIS — Z71.89 OTHER SPECIFIED COUNSELING: ICD-10-CM

## 2022-01-01 DIAGNOSIS — R68.89 COLD INTOLERANCE: ICD-10-CM

## 2022-01-01 DIAGNOSIS — R74.8 ELEVATED ALKALINE PHOSPHATASE LEVEL: ICD-10-CM

## 2022-01-01 LAB
ALBUMIN SERPL-MCNC: 1.7 G/DL (ref 3.4–5)
ALBUMIN SERPL-MCNC: 1.7 G/DL (ref 3.4–5)
ALBUMIN UR-MCNC: >=300 MG/DL
ALP SERPL-CCNC: 796 U/L (ref 40–150)
ALP SERPL-CCNC: >2330 U/L (ref 40–150)
ALT SERPL W P-5'-P-CCNC: 201 U/L (ref 0–70)
ALT SERPL W P-5'-P-CCNC: 42 U/L (ref 0–70)
AMORPH CRY #/AREA URNS HPF: ABNORMAL /HPF
ANION GAP SERPL CALCULATED.3IONS-SCNC: 7 MMOL/L (ref 3–14)
ANION GAP SERPL CALCULATED.3IONS-SCNC: 8 MMOL/L (ref 3–14)
APPEARANCE UR: CLEAR
APTT PPP: 31 SECONDS (ref 22–38)
AST SERPL W P-5'-P-CCNC: 125 U/L (ref 0–45)
AST SERPL W P-5'-P-CCNC: 425 U/L (ref 0–45)
ATRIAL RATE - MUSE: 93 BPM
BASOPHILS # BLD AUTO: 0 10E3/UL (ref 0–0.2)
BASOPHILS NFR BLD AUTO: 0 %
BILIRUB SERPL-MCNC: 1.1 MG/DL (ref 0.2–1.3)
BILIRUB SERPL-MCNC: 5.8 MG/DL (ref 0.2–1.3)
BILIRUB UR QL STRIP: ABNORMAL
BUN SERPL-MCNC: 28 MG/DL (ref 7–30)
BUN SERPL-MCNC: 38 MG/DL (ref 7–30)
CALCIUM SERPL-MCNC: 8.2 MG/DL (ref 8.5–10.1)
CALCIUM SERPL-MCNC: 9.9 MG/DL (ref 8.5–10.1)
CHLORIDE BLD-SCNC: 104 MMOL/L (ref 94–109)
CHLORIDE BLD-SCNC: 105 MMOL/L (ref 94–109)
CHOLEST SERPL-MCNC: 349 MG/DL
CK SERPL-CCNC: 46 U/L (ref 30–300)
CO2 SERPL-SCNC: 24 MMOL/L (ref 20–32)
CO2 SERPL-SCNC: 25 MMOL/L (ref 20–32)
COLOR UR AUTO: YELLOW
CREAT SERPL-MCNC: 1.87 MG/DL (ref 0.52–1.25)
CREAT SERPL-MCNC: 2.19 MG/DL (ref 0.52–1.25)
CREAT UR-MCNC: 58 MG/DL
DIASTOLIC BLOOD PRESSURE - MUSE: NORMAL MMHG
EOSINOPHIL # BLD AUTO: 0 10E3/UL (ref 0–0.7)
EOSINOPHIL NFR BLD AUTO: 0 %
ERYTHROCYTE [DISTWIDTH] IN BLOOD BY AUTOMATED COUNT: 14.7 % (ref 10–15)
ERYTHROCYTE [DISTWIDTH] IN BLOOD BY AUTOMATED COUNT: 15 % (ref 10–15)
FASTING STATUS PATIENT QL REPORTED: YES
FERRITIN SERPL-MCNC: 2607 NG/ML (ref 8–388)
GFR SERPL CREATININE-BSD FRML MDRD: 35 ML/MIN/1.73M2
GFR SERPL CREATININE-BSD FRML MDRD: 42 ML/MIN/1.73M2
GLUCOSE BLD-MCNC: 123 MG/DL (ref 70–99)
GLUCOSE BLD-MCNC: 273 MG/DL (ref 70–99)
GLUCOSE UR STRIP-MCNC: 500 MG/DL
HBA1C MFR BLD: 7.7 % (ref 0–5.6)
HCT VFR BLD AUTO: 33.2 % (ref 35–53)
HCT VFR BLD AUTO: 38.8 % (ref 35–53)
HDLC SERPL-MCNC: 7 MG/DL
HGB BLD-MCNC: 10.2 G/DL (ref 11.7–17.7)
HGB BLD-MCNC: 12.4 G/DL (ref 11.7–17.7)
HGB UR QL STRIP: ABNORMAL
HOLD SPECIMEN: NORMAL
HYALINE CASTS #/AREA URNS LPF: ABNORMAL /LPF
IMM GRANULOCYTES # BLD: 0.1 10E3/UL
IMM GRANULOCYTES NFR BLD: 0 %
INR PPP: 1.06 (ref 0.85–1.15)
INTERPRETATION ECG - MUSE: NORMAL
KETONES UR STRIP-MCNC: NEGATIVE MG/DL
LDLC SERPL CALC-MCNC: 295 MG/DL
LEUKOCYTE ESTERASE UR QL STRIP: NEGATIVE
LIPASE SERPL-CCNC: 192 U/L (ref 73–393)
LYMPHOCYTES # BLD AUTO: 0.6 10E3/UL (ref 0.8–5.3)
LYMPHOCYTES NFR BLD AUTO: 5 %
MAGNESIUM SERPL-MCNC: 2 MG/DL (ref 1.6–2.3)
MCH RBC QN AUTO: 26.6 PG (ref 26.5–33)
MCH RBC QN AUTO: 26.8 PG (ref 26.5–33)
MCHC RBC AUTO-ENTMCNC: 30.7 G/DL (ref 31.5–36.5)
MCHC RBC AUTO-ENTMCNC: 32 G/DL (ref 31.5–36.5)
MCV RBC AUTO: 84 FL (ref 78–100)
MCV RBC AUTO: 87 FL (ref 78–100)
MICROALBUMIN UR-MCNC: 2770 MG/L
MICROALBUMIN/CREAT UR: 4775.86 MG/G CR
MONOCYTES # BLD AUTO: 0.6 10E3/UL (ref 0–1.3)
MONOCYTES NFR BLD AUTO: 5 %
MUCOUS THREADS #/AREA URNS LPF: PRESENT /LPF
NEUTROPHILS # BLD AUTO: 10.6 10E3/UL (ref 1.6–8.3)
NEUTROPHILS NFR BLD AUTO: 90 %
NITRATE UR QL: NEGATIVE
NONHDLC SERPL-MCNC: 342 MG/DL
NRBC # BLD AUTO: 0 10E3/UL
NRBC BLD AUTO-RTO: 0 /100
P AXIS - MUSE: -2 DEGREES
PATH REPORT.ADDENDUM SPEC: ABNORMAL
PATH REPORT.COMMENTS IMP SPEC: ABNORMAL
PATH REPORT.COMMENTS IMP SPEC: YES
PATH REPORT.FINAL DX SPEC: ABNORMAL
PATH REPORT.GROSS SPEC: ABNORMAL
PATH REPORT.MICROSCOPIC SPEC OTHER STN: ABNORMAL
PATH REPORT.MICROSCOPIC SPEC OTHER STN: ABNORMAL
PATH REPORT.RELEVANT HX SPEC: ABNORMAL
PH UR STRIP: 6 [PH] (ref 5–7)
PHOTO IMAGE: ABNORMAL
PLATELET # BLD AUTO: 264 10E3/UL (ref 150–450)
PLATELET # BLD AUTO: 308 10E3/UL (ref 150–450)
POTASSIUM BLD-SCNC: 3.2 MMOL/L (ref 3.4–5.3)
POTASSIUM BLD-SCNC: 4.7 MMOL/L (ref 3.4–5.3)
PR INTERVAL - MUSE: 120 MS
PROT SERPL-MCNC: 6.3 G/DL (ref 6.8–8.8)
PROT SERPL-MCNC: 7 G/DL (ref 6.8–8.8)
PSA SERPL-MCNC: 0.73 UG/L (ref 0–4)
QRS DURATION - MUSE: 144 MS
QT - MUSE: 420 MS
QTC - MUSE: 522 MS
R AXIS - MUSE: -30 DEGREES
RBC # BLD AUTO: 3.84 10E6/UL (ref 3.8–5.9)
RBC # BLD AUTO: 4.62 10E6/UL (ref 3.8–5.9)
RBC #/AREA URNS AUTO: ABNORMAL /HPF
RETINOPATHY: NORMAL
SARS-COV-2 RNA RESP QL NAA+PROBE: NEGATIVE
SODIUM SERPL-SCNC: 136 MMOL/L (ref 133–144)
SODIUM SERPL-SCNC: 137 MMOL/L (ref 133–144)
SP GR UR STRIP: 1.02 (ref 1–1.03)
SYSTOLIC BLOOD PRESSURE - MUSE: NORMAL MMHG
T AXIS - MUSE: -18 DEGREES
TRIGL SERPL-MCNC: 235 MG/DL
TROPONIN I SERPL HS-MCNC: 36 NG/L
TSH SERPL DL<=0.005 MIU/L-ACNC: 0.99 MU/L (ref 0.4–4)
UROBILINOGEN UR STRIP-ACNC: 2 E.U./DL
VENTRICULAR RATE- MUSE: 93 BPM
WBC # BLD AUTO: 11.9 10E3/UL (ref 4–11)
WBC # BLD AUTO: 12.7 10E3/UL (ref 4–11)
WBC #/AREA URNS AUTO: ABNORMAL /HPF

## 2022-01-01 PROCEDURE — 82550 ASSAY OF CK (CPK): CPT | Performed by: FAMILY MEDICINE

## 2022-01-01 PROCEDURE — 99207 PR FOOT EXAM NO CHARGE: CPT | Mod: 25 | Performed by: FAMILY MEDICINE

## 2022-01-01 PROCEDURE — 81001 URINALYSIS AUTO W/SCOPE: CPT | Performed by: FAMILY MEDICINE

## 2022-01-01 PROCEDURE — 272N000431 US BIOPSY LIVER

## 2022-01-01 PROCEDURE — 85730 THROMBOPLASTIN TIME PARTIAL: CPT | Performed by: RADIOLOGY

## 2022-01-01 PROCEDURE — 99396 PREV VISIT EST AGE 40-64: CPT | Mod: 25 | Performed by: FAMILY MEDICINE

## 2022-01-01 PROCEDURE — 84484 ASSAY OF TROPONIN QUANT: CPT | Performed by: PHYSICIAN ASSISTANT

## 2022-01-01 PROCEDURE — 36415 COLL VENOUS BLD VENIPUNCTURE: CPT | Performed by: FAMILY MEDICINE

## 2022-01-01 PROCEDURE — 82043 UR ALBUMIN QUANTITATIVE: CPT | Performed by: FAMILY MEDICINE

## 2022-01-01 PROCEDURE — 250N000011 HC RX IP 250 OP 636: Performed by: PHYSICIAN ASSISTANT

## 2022-01-01 PROCEDURE — U0003 INFECTIOUS AGENT DETECTION BY NUCLEIC ACID (DNA OR RNA); SEVERE ACUTE RESPIRATORY SYNDROME CORONAVIRUS 2 (SARS-COV-2) (CORONAVIRUS DISEASE [COVID-19]), AMPLIFIED PROBE TECHNIQUE, MAKING USE OF HIGH THROUGHPUT TECHNOLOGIES AS DESCRIBED BY CMS-2020-01-R: HCPCS | Performed by: FAMILY MEDICINE

## 2022-01-01 PROCEDURE — 88307 TISSUE EXAM BY PATHOLOGIST: CPT | Mod: 26 | Performed by: PATHOLOGY

## 2022-01-01 PROCEDURE — 80053 COMPREHEN METABOLIC PANEL: CPT | Performed by: FAMILY MEDICINE

## 2022-01-01 PROCEDURE — 99214 OFFICE O/P EST MOD 30 MIN: CPT | Mod: 25 | Performed by: FAMILY MEDICINE

## 2022-01-01 PROCEDURE — 99214 OFFICE O/P EST MOD 30 MIN: CPT | Mod: GT | Performed by: FAMILY MEDICINE

## 2022-01-01 PROCEDURE — 83036 HEMOGLOBIN GLYCOSYLATED A1C: CPT | Performed by: FAMILY MEDICINE

## 2022-01-01 PROCEDURE — 96375 TX/PRO/DX INJ NEW DRUG ADDON: CPT

## 2022-01-01 PROCEDURE — 36415 COLL VENOUS BLD VENIPUNCTURE: CPT | Performed by: PHYSICIAN ASSISTANT

## 2022-01-01 PROCEDURE — 88342 IMHCHEM/IMCYTCHM 1ST ANTB: CPT | Mod: 26 | Performed by: PATHOLOGY

## 2022-01-01 PROCEDURE — 96374 THER/PROPH/DIAG INJ IV PUSH: CPT | Mod: 59

## 2022-01-01 PROCEDURE — 85610 PROTHROMBIN TIME: CPT | Performed by: RADIOLOGY

## 2022-01-01 PROCEDURE — 250N000011 HC RX IP 250 OP 636: Performed by: RADIOLOGY

## 2022-01-01 PROCEDURE — 99285 EMERGENCY DEPT VISIT HI MDM: CPT | Mod: 25

## 2022-01-01 PROCEDURE — 74177 CT ABD & PELVIS W/CONTRAST: CPT

## 2022-01-01 PROCEDURE — U0005 INFEC AGEN DETEC AMPLI PROBE: HCPCS | Performed by: FAMILY MEDICINE

## 2022-01-01 PROCEDURE — 88341 IMHCHEM/IMCYTCHM EA ADD ANTB: CPT | Mod: 26 | Performed by: PATHOLOGY

## 2022-01-01 PROCEDURE — 88342 IMHCHEM/IMCYTCHM 1ST ANTB: CPT | Mod: TC | Performed by: INTERNAL MEDICINE

## 2022-01-01 PROCEDURE — 83735 ASSAY OF MAGNESIUM: CPT | Performed by: FAMILY MEDICINE

## 2022-01-01 PROCEDURE — 85025 COMPLETE CBC W/AUTO DIFF WBC: CPT | Performed by: PHYSICIAN ASSISTANT

## 2022-01-01 PROCEDURE — 36415 COLL VENOUS BLD VENIPUNCTURE: CPT | Performed by: RADIOLOGY

## 2022-01-01 PROCEDURE — 76705 ECHO EXAM OF ABDOMEN: CPT

## 2022-01-01 PROCEDURE — 80061 LIPID PANEL: CPT | Performed by: FAMILY MEDICINE

## 2022-01-01 PROCEDURE — 250N000009 HC RX 250: Performed by: PHYSICIAN ASSISTANT

## 2022-01-01 PROCEDURE — 84443 ASSAY THYROID STIM HORMONE: CPT | Performed by: FAMILY MEDICINE

## 2022-01-01 PROCEDURE — 82728 ASSAY OF FERRITIN: CPT | Performed by: FAMILY MEDICINE

## 2022-01-01 PROCEDURE — 88307 TISSUE EXAM BY PATHOLOGIST: CPT | Mod: TC | Performed by: INTERNAL MEDICINE

## 2022-01-01 PROCEDURE — 85027 COMPLETE CBC AUTOMATED: CPT | Performed by: FAMILY MEDICINE

## 2022-01-01 PROCEDURE — 80053 COMPREHEN METABOLIC PANEL: CPT | Performed by: PHYSICIAN ASSISTANT

## 2022-01-01 PROCEDURE — 93005 ELECTROCARDIOGRAM TRACING: CPT

## 2022-01-01 PROCEDURE — 250N000009 HC RX 250: Performed by: RADIOLOGY

## 2022-01-01 PROCEDURE — 83690 ASSAY OF LIPASE: CPT | Performed by: PHYSICIAN ASSISTANT

## 2022-01-01 PROCEDURE — G0103 PSA SCREENING: HCPCS | Performed by: FAMILY MEDICINE

## 2022-01-01 RX ORDER — NALOXONE HYDROCHLORIDE 0.4 MG/ML
0.4 INJECTION, SOLUTION INTRAMUSCULAR; INTRAVENOUS; SUBCUTANEOUS
Status: DISCONTINUED | OUTPATIENT
Start: 2022-01-01 | End: 2022-01-01 | Stop reason: HOSPADM

## 2022-01-01 RX ORDER — METOCLOPRAMIDE HYDROCHLORIDE 5 MG/ML
10 INJECTION INTRAMUSCULAR; INTRAVENOUS ONCE
Status: COMPLETED | OUTPATIENT
Start: 2022-01-01 | End: 2022-01-01

## 2022-01-01 RX ORDER — CYCLOBENZAPRINE HCL 5 MG
TABLET ORAL
Qty: 20 TABLET | Refills: 0 | Status: SHIPPED | OUTPATIENT
Start: 2022-01-01 | End: 2022-01-01

## 2022-01-01 RX ORDER — OXYCODONE AND ACETAMINOPHEN 5; 325 MG/1; MG/1
2 TABLET ORAL
Status: CANCELLED | OUTPATIENT
Start: 2022-01-01

## 2022-01-01 RX ORDER — NALOXONE HYDROCHLORIDE 0.4 MG/ML
0.2 INJECTION, SOLUTION INTRAMUSCULAR; INTRAVENOUS; SUBCUTANEOUS
Status: DISCONTINUED | OUTPATIENT
Start: 2022-01-01 | End: 2022-01-01 | Stop reason: HOSPADM

## 2022-01-01 RX ORDER — DIPHENHYDRAMINE HYDROCHLORIDE 50 MG/ML
25 INJECTION INTRAMUSCULAR; INTRAVENOUS ONCE
Status: COMPLETED | OUTPATIENT
Start: 2022-01-01 | End: 2022-01-01

## 2022-01-01 RX ORDER — METOCLOPRAMIDE HYDROCHLORIDE 5 MG/ML
10 INJECTION INTRAMUSCULAR; INTRAVENOUS ONCE
Status: DISCONTINUED | OUTPATIENT
Start: 2022-01-01 | End: 2022-01-01

## 2022-01-01 RX ORDER — GLIMEPIRIDE 4 MG/1
TABLET ORAL
Qty: 180 TABLET | Refills: 3 | Status: SHIPPED | OUTPATIENT
Start: 2022-01-01

## 2022-01-01 RX ORDER — CYCLOBENZAPRINE HCL 5 MG
TABLET ORAL
Qty: 20 TABLET | Refills: 0 | Status: SHIPPED | OUTPATIENT
Start: 2022-01-01

## 2022-01-01 RX ORDER — GLIMEPIRIDE 4 MG/1
TABLET ORAL
Qty: 180 TABLET | Refills: 0 | Status: SHIPPED | OUTPATIENT
Start: 2022-01-01 | End: 2022-01-01

## 2022-01-01 RX ORDER — OMEPRAZOLE 40 MG/1
40 CAPSULE, DELAYED RELEASE ORAL DAILY
Qty: 90 CAPSULE | Refills: 3 | Status: SHIPPED | OUTPATIENT
Start: 2022-01-01

## 2022-01-01 RX ORDER — LIDOCAINE 40 MG/G
CREAM TOPICAL
Status: DISCONTINUED | OUTPATIENT
Start: 2022-01-01 | End: 2022-01-01 | Stop reason: HOSPADM

## 2022-01-01 RX ORDER — ROSUVASTATIN CALCIUM 20 MG/1
20 TABLET, COATED ORAL DAILY
Qty: 90 TABLET | Refills: 3 | Status: SHIPPED | OUTPATIENT
Start: 2022-01-01

## 2022-01-01 RX ORDER — AMLODIPINE BESYLATE 5 MG/1
5 TABLET ORAL DAILY
Qty: 90 TABLET | Refills: 3 | Status: SHIPPED | OUTPATIENT
Start: 2022-01-01

## 2022-01-01 RX ORDER — TRAZODONE HYDROCHLORIDE 50 MG/1
50-100 TABLET, FILM COATED ORAL AT BEDTIME
Qty: 90 TABLET | Refills: 3 | Status: CANCELLED | OUTPATIENT
Start: 2022-01-01

## 2022-01-01 RX ORDER — FENTANYL CITRATE 50 UG/ML
25-50 INJECTION, SOLUTION INTRAMUSCULAR; INTRAVENOUS EVERY 5 MIN PRN
Status: DISCONTINUED | OUTPATIENT
Start: 2022-01-01 | End: 2022-01-01 | Stop reason: HOSPADM

## 2022-01-01 RX ORDER — FLUMAZENIL 0.1 MG/ML
0.2 INJECTION, SOLUTION INTRAVENOUS
Status: DISCONTINUED | OUTPATIENT
Start: 2022-01-01 | End: 2022-01-01 | Stop reason: HOSPADM

## 2022-01-01 RX ORDER — SILDENAFIL 100 MG/1
50-100 TABLET, FILM COATED ORAL DAILY PRN
Qty: 18 TABLET | Refills: 3 | Status: SHIPPED | OUTPATIENT
Start: 2022-01-01

## 2022-01-01 RX ORDER — SERTRALINE HYDROCHLORIDE 100 MG/1
200 TABLET, FILM COATED ORAL DAILY
Qty: 180 TABLET | Refills: 3 | Status: SHIPPED | OUTPATIENT
Start: 2022-01-01

## 2022-01-01 RX ORDER — IOPAMIDOL 755 MG/ML
500 INJECTION, SOLUTION INTRAVASCULAR ONCE
Status: COMPLETED | OUTPATIENT
Start: 2022-01-01 | End: 2022-01-01

## 2022-01-01 RX ORDER — ONDANSETRON 4 MG/1
4 TABLET, ORALLY DISINTEGRATING ORAL EVERY 8 HOURS PRN
Qty: 10 TABLET | Refills: 0 | Status: SHIPPED | OUTPATIENT
Start: 2022-01-01 | End: 2022-01-01

## 2022-01-01 RX ORDER — SERTRALINE HYDROCHLORIDE 100 MG/1
TABLET, FILM COATED ORAL
Qty: 180 TABLET | Refills: 0 | Status: SHIPPED | OUTPATIENT
Start: 2022-01-01 | End: 2022-01-01

## 2022-01-01 RX ORDER — INSULIN LISPRO 200 [IU]/ML
INJECTION, SOLUTION SUBCUTANEOUS
Qty: 27 ML | Refills: 11 | Status: SHIPPED | OUTPATIENT
Start: 2022-01-01

## 2022-01-01 RX ORDER — LOSARTAN POTASSIUM 100 MG/1
100 TABLET ORAL DAILY
Qty: 90 TABLET | Refills: 3 | Status: SHIPPED | OUTPATIENT
Start: 2022-01-01

## 2022-01-01 RX ORDER — TRAZODONE HYDROCHLORIDE 50 MG/1
25-50 TABLET, FILM COATED ORAL AT BEDTIME
Qty: 90 TABLET | Refills: 3 | Status: SHIPPED | OUTPATIENT
Start: 2022-01-01

## 2022-01-01 RX ADMIN — DIPHENHYDRAMINE HYDROCHLORIDE 25 MG: 50 INJECTION INTRAMUSCULAR; INTRAVENOUS at 13:12

## 2022-01-01 RX ADMIN — LIDOCAINE HYDROCHLORIDE 10 ML: 10 INJECTION, SOLUTION EPIDURAL; INFILTRATION; INTRACAUDAL; PERINEURAL at 09:49

## 2022-01-01 RX ADMIN — MIDAZOLAM 1 MG: 1 INJECTION INTRAMUSCULAR; INTRAVENOUS at 09:48

## 2022-01-01 RX ADMIN — SODIUM CHLORIDE 65 ML: 9 INJECTION, SOLUTION INTRAVENOUS at 15:58

## 2022-01-01 RX ADMIN — IOPAMIDOL 100 ML: 755 INJECTION, SOLUTION INTRAVENOUS at 15:58

## 2022-01-01 RX ADMIN — FENTANYL CITRATE 50 MCG: 0.05 INJECTION, SOLUTION INTRAMUSCULAR; INTRAVENOUS at 09:48

## 2022-01-01 RX ADMIN — METOCLOPRAMIDE HYDROCHLORIDE 10 MG: 5 INJECTION INTRAMUSCULAR; INTRAVENOUS at 13:14

## 2022-01-01 ASSESSMENT — ENCOUNTER SYMPTOMS
MYALGIAS: 0
DIARRHEA: 1
HEADACHES: 1
NERVOUS/ANXIOUS: 1
DIARRHEA: 0
SHORTNESS OF BREATH: 0
SORE THROAT: 0
FEVER: 0
ABDOMINAL PAIN: 1
JOINT SWELLING: 0
NAUSEA: 1
ABDOMINAL PAIN: 0
HEADACHES: 1
EYE PAIN: 0
HEARTBURN: 0
ARTHRALGIAS: 1
CONSTIPATION: 0
CHILLS: 1
WEAKNESS: 1
DYSURIA: 0
COUGH: 0
VOMITING: 1
HEMATOCHEZIA: 0
DIZZINESS: 0
BREAST MASS: 0
HEMATURIA: 0
PARESTHESIAS: 0
FREQUENCY: 0
PALPITATIONS: 0
NAUSEA: 1
FATIGUE: 1
CHILLS: 0

## 2022-01-01 ASSESSMENT — PATIENT HEALTH QUESTIONNAIRE - PHQ9
SUM OF ALL RESPONSES TO PHQ QUESTIONS 1-9: 8
10. IF YOU CHECKED OFF ANY PROBLEMS, HOW DIFFICULT HAVE THESE PROBLEMS MADE IT FOR YOU TO DO YOUR WORK, TAKE CARE OF THINGS AT HOME, OR GET ALONG WITH OTHER PEOPLE: VERY DIFFICULT
SUM OF ALL RESPONSES TO PHQ QUESTIONS 1-9: 8

## 2022-02-08 NOTE — TELEPHONE ENCOUNTER
cyclobenzaprine (FLEXERIL) 5 MG tablet 20 tablet 1 12/6/2021  --   Sig - Route: Take 1-2 tablets (5-10 mg) by mouth nightly as needed for muscle spasms - Oral   Sent to pharmacy as: Cyclobenzaprine HCl 5 MG Oral Tablet (FLEXERIL)   Class: E-Prescribe       Last office visit: 12/14/2021     Future Office Visit:        CSA -- Patient Level:    CSA: None found at the patient level.             Routing refill request to provider for review/approval because:  Drug not on the [facility] refill protocol     Shreya Rivas RN, BSN  [facility]

## 2022-02-08 NOTE — LETTER
[address] [id number] [phone]       February 14, 2022    Braden De Leon  [address]  [facility] [place] [address]    Braden:    We have been calling you regarding a recent refill request we received for Flexeril.  Unfortunately, we were unable to reach you.  We are notifying you that you are due for a fasting physical  prior to your next refill.  You can schedule this appointment via [employer] or by calling the clinic at [phone].      Sincerely,        Pepe Spear M.D./misty

## 2022-02-09 NOTE — TELEPHONE ENCOUNTER
Limited rx done    Due for cpx fasting soon    Lab Results   Component Value Date    A1C 9.4 12/06/2021    A1C 8.5 04/07/2021    A1C 7.4 11/18/2019    A1C 7.4 06/25/2019    A1C 6.4 02/26/2019    A1C 6.5 10/09/2018

## 2022-02-14 NOTE — TELEPHONE ENCOUNTER
Left non-detailed message for patient to call back.  Please schedule fasting physical  when patient calls back.  (see previous notes for details)    I have been unable to reach this patient by phone.  A letter is being sent to the last known home address.      Thanks Jolanta

## 2022-04-03 NOTE — TELEPHONE ENCOUNTER
cyclobenzaprine (FLEXERIL) 5 MG tablet 20 tablet 0 2/8/2022  No   Sig: TAKE 1 TO 2 TABLETS(5 TO 10 MG) BY MOUTH EVERY NIGHT AS NEEDED FOR MUSCLE SPASMS   Sent to pharmacy as: Cyclobenzaprine HCl 5 MG Oral Tablet (FLEXERIL)   Class: E-Prescribe       Last office visit: 12/14/2021     Future Office Visit:        CSA -- Patient Level:    CSA: None found at the patient level.             Routing refill request to provider for review/approval because:  Drug not on the [facility] refill protocol     Shreya Rivas RN, BSN  [facility]

## 2022-05-06 NOTE — TELEPHONE ENCOUNTER
Routing refill request to provider for review/approval because:  Drug not on the [facility] refill protocol     LOV:  12/14/2021     No future appointments.     CSA -- Patient Level:    CSA: None found at the patient level.         Dahlia Chong RN  [facility]
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
## 2022-05-12 NOTE — LETTER
[address] [facility] [address] [phone]       May 19, 2022    Braden De Leon  [address]  [place] [address]    To Braden:     We have been calling you regarding a recent refill request we received for Glimepiride and Sertraline .  Unfortunately, we were unable to reach you.  We are notifying you that you are due for physical with fasting labs prior to your next refill.  You can schedule this appointment via [employer] or by calling the clinic at [phone] Option 1.          Sincerely,        Pepe Spear M.D./crc

## 2022-05-13 NOTE — TELEPHONE ENCOUNTER
Amaryl: Routing refill request to provider for review/approval because:  Labs out of range:    Lab Test 12/06/21  1142   A1C 9.4*      Zoloft: Routing refill request to provider for review/approval because:  Drug interaction warning

## 2022-05-16 NOTE — TELEPHONE ENCOUNTER
Provider Response to 2nd Level Triage Request    I have reviewed the RN documentation. My recommendation is:  To [facility], for probable IV fluids and evaluation

## 2022-05-16 NOTE — DISCHARGE INSTRUCTIONS
Your work up revealed evidence for a liver mass of unclear cause. You will require further work up with respect to this. It is certainly possible that this and/or your gallstones are causing the nausea/vomiting that you are experiencing. I will prescribe Zofran for home. This is an anti-nausea medication.    We will discharge you to home. You will need to follow up with [facility] for further work up.

## 2022-05-16 NOTE — TELEPHONE ENCOUNTER
Nurse Triage SBAR    Is this a 2nd Level Triage? YES, LICENSED PRACTITIONER REVIEW IS REQUIRED    Situation: Patient calls with vomiting and diarrhea for 24 hours, Blood sugar readings vary widely.    Background: Health Hx: Diabetes type 2, migraines    Assessment: Patient calls with wide variety of symptoms. Patient has nausea, has vomited 9 times, has had diarrhea 4 times within last 24 hours, currently has an 8/10 headache located bilaterally at the temples, complaints of fatigue and bilateral lower extremity weakness. Patient states he feels right sided pain under the rib cage put feels it is a muscle pulled. Did not start until after patient started vomiting. Currently Afebrile. At home Covid test was negative. No known ill contacts, no one else in the household is experiencing illness. Patient is not able to keep down food. Was able to keep down a sip of water while on the phone. Patient denies dehydration but does state his mouth is dry. Patient is urinating frequently.    Blood sugars have varied from 300 to 50. Blood sugar currently 99 checked with [facility].     5/12/22- Patient's wife requested a triage call for toes and foot wound. Patient stated that he had blisters on his right foot that he thought was healing. Last week, wife stated there was a smell. Patient denied numbness and tingling and loss of sensation to the extremity.     Patient requests message be sent to PCP and Endocrinology team.    Ok to leave detailed message.    Protocol Recommended Disposition:   Call Back By PCP Within 1 Hour    Recommendation: Please advise on Office Visit, ADS, UC, ER, or home care     Routed to provider    Does the patient meet one of the following criteria for ADS visit consideration? 16+ years old, with an [facility] PCP     TIP  Providers, please consider if this condition is appropriate for management at one of our Acute and Diagnostic Services sites.     If patient is a good candidate, please use dotphrase  <dot>triageresponse and select Refer to ADS to document.    Reason for Disposition    SEVERE headache and vomiting    Additional Information    Negative: Shock suspected (e.g., cold/pale/clammy skin, too weak to stand, low BP, rapid pulse)    Negative: Difficult to awaken or acting confused (e.g., disoriented, slurred speech)    Negative: Sounds like a life-threatening emergency to the triager    Negative: Vomiting occurs only while coughing    Negative: Pregnant < 20 Weeks and nausea/vomiting began in early pregnancy (i.e., 4-8 weeks pregnant)    Negative: Chest pain    Negative: Headache is main symptom    Negative: SEVERE vomiting (e.g., 6 or more times/day) (Exception: patient sounds well, is drinking liquids, does not sound dehydrated, and vomiting has lasted less than 24 hours)    Negative: MODERATE vomiting (e.g., 3 - 5 times/day) and age > 60    Negative: Vomiting contains bile (green color)    Negative: Vomiting red blood or black (coffee ground) material    Negative: Insulin-dependent diabetes and glucose > 240 mg/dL (13 mmol/L)    Negative: Recent head injury (within 3 days)    Negative: Recent abdominal injury (within 7 days)    Negative: Drinking very little and has signs of dehydration (e.g., no urine > 12 hours, very dry mouth, very lightheaded)    Negative: Constant abdominal pain lasting > 2 hours    Negative: High-risk adult (e.g., brain tumor, V-P shunt, hernia)    Negative: Severe pain in one eye    Negative: Patient sounds very sick or weak to the triager    Negative: Vomiting and abdomen looks much more swollen than usual    Negative: Fever > 103 F (39.4 C)    Negative: Fever > 101 F (38.3 C) and over 60 years of age    Negative: Fever > 100.0 F (37.8 C) and has a weak immune system (e.g., HIV positive, cancer chemo, organ transplant, splenectomy, chronic steroids)    Negative: Fever > 100.0 F (37.8 C) and bedridden (e.g., nursing home patient, stroke, chronic illness, recovering from  "surgery)    Negative: Taking any of the following medications: digoxin (Lanoxin), lithium, theophylline, phenytoin (Dilantin)    Answer Assessment - Initial Assessment Questions  1. VOMITING SEVERITY: \"How many times have you vomited in the past 24 hours?\"      - MILD:  1 - 2 times/day     - MODERATE: 3 - 5 times/day, decreased oral intake without significant weight loss or symptoms of dehydration     - SEVERE: 6 or more times/day, vomits everything or nearly everything, with significant weight loss, symptoms of dehydration       9  2. ONSET: \"When did the vomiting begin?\"       24 hours previous  3. FLUIDS: \"What fluids or food have you vomited up today?\" \"Have you been able to keep any fluids down?\"      Everything. Not able to keep anything down.  4. ABDOMINAL PAIN: \"Are your having any abdominal pain?\" If yes : \"How bad is it and what does it feel like?\" (e.g., crampy, dull, intermittent, constant)       Right side under rib cage, pulled muscle minimal pain  5. DIARRHEA: \"Is there any diarrhea?\" If so, ask: \"How many times today?\"       5  6. CONTACTS: \"Is there anyone else in the family with the same symptoms?\"       No  7. CAUSE: \"What do you think is causing your vomiting?\"      Unsure  8. HYDRATION STATUS: \"Any signs of dehydration?\" (e.g., dry mouth [not only dry lips], too weak to stand) \"When did you last urinate?\"      Difficult to stand, last urinated this morning  9. OTHER SYMPTOMS: \"Do you have any other symptoms?\" (e.g., fever, headache, vertigo, vomiting blood or coffee grounds, recent head injury)      Headache- 8/10 at temples  10. PREGNANCY: \"Is there any chance you are pregnant?\" \"When was your last menstrual period?\"        No    Protocols used: VOMITING-A-OH      "

## 2022-05-16 NOTE — LETTER
May 16, 2022      To Whom It May Concern:      Braden De Leon was seen in our Emergency Department today, 05/16/22.  I expect Braden De Leon's condition to improve over the next 3 days.  Braden De Leon may return to work when improved.    Sincerely,        Tierney Byrne RN

## 2022-05-16 NOTE — LETTER
May 16, 2022      To Whom It May Concern:      Braden De Leon was seen in our Emergency Department today, 05/16/22.  I expect Braden De Leon's condition to improve over the next 3 days.  Braden De Leon may return to work when improved.    Sincerely,        Mitchell Mack PA-C

## 2022-05-16 NOTE — ED TRIAGE NOTES
Patient presents to the ED with headache, vomiting and diarrhea since yesterday. History of migraines, but states this feels different than usual headache.

## 2022-05-16 NOTE — TELEPHONE ENCOUNTER
Called patient - didn't have good connection. Called again and left a message.     Called wife, Lela, C2C on file. Advised of ED visit per Dr. Spear. She stated an understanding and agreed with plan. Will plan to be at [facility] in the next half hour. Report given to ER Triage Nurse.     Dahlia Chong RN  [facility]

## 2022-05-16 NOTE — ED PROVIDER NOTES
History     Chief Complaint:  Abdominal Pain, Nausea, Vomiting, Diarrhea     HPI   Braden De Leon is a 52 year old adult with a history of HTN, HLD, diabetes, who presents to the ED for evaluation of abdominal pain, nausea, vomiting, and diarrhea.  Patient reports onset of RUQ abdominal pain,  nausea, vomiting, and diarrhea that began about 24 hours ago.  Pain to the RUQ is been constant since onset.  He describes it as feeling like a pulled muscle.  It is nonradiating.  He rates his pain as 8/10 in severity anytime he coughs, however 2/10 currently at rest.  He notes associated frontal headache, fatigue, and chills.  He denies any fever, rhinorrhea, congestion, pharyngitis, cough, chest pain, dyspnea, leg swelling.    ROS:  Review of Systems   Constitutional: Positive for chills and fatigue.   Gastrointestinal: Positive for abdominal pain, diarrhea, nausea and vomiting.   Neurological: Positive for headaches.   All other systems reviewed and are negative.    Allergies:  Metformin Hcl [Riomet]  Penicillins  Thiethylperazine Maleate     Medications:    amLODIPine (NORVASC) 5 MG tablet  aspirin 81 MG tablet  blood glucose monitoring (SOFTCLIX) lancets  busPIRone (BUSPAR) 10 MG tablet  Continuous Blood Gluc Sensor (FREESTYLE CHRISTIANE 14 DAY SENSOR) MISC  cyclobenzaprine (FLEXERIL) 5 MG tablet  glimepiride (AMARYL) 4 MG tablet  insulin glargine (LANTUS PEN) 100 UNIT/ML pen  Insulin Lispro (HUMALOG KWIKPEN) 200 UNIT/ML soln  insulin pen needle (NOVOFINE PLUS) 32G X 4 MM miscellaneous  losartan (COZAAR) 100 MG tablet  naproxen sodium (ALEVE) 220 MG tablet  omeprazole (PRILOSEC) 40 MG DR capsule  rosuvastatin (CRESTOR) 20 MG tablet  sertraline (ZOLOFT) 100 MG tablet  sildenafil (VIAGRA) 100 MG tablet  traZODone (DESYREL) 50 MG tablet    Past Medical History:    Past Medical History:   Diagnosis Date     Atypical nevus 03/2019     Benign neoplasm of colon 11/07, 2/12     Cholelithiasis      CKD (chronic kidney disease) stage  3, GFR 30-59 ml/min (H)      Diabetic macular edema (H)      ED (erectile dysfunction)      Esophageal reflux 1995     Esophageal stricture 8/13     Esophagitis 8/13     Hyperlipidemia LDL goal < 70      Hypertension goal BP (blood pressure) < 140/90 3/09     Migraine 1986     Nephrolithiasis 10/2018     Obesity (BMI 30.0-34.9)      Other premature beats 11/07     Proliferative diabetic retinopathy (H) 06/2017     Type 2 diabetes mellitus with complications (H) 1997     Unspecified gastritis and gastroduodenitis without mention of hemorrhage 1995       Past Surgical History:    Past Surgical History:   Procedure Laterality Date     COLONOSCOPY  11/07, 2/12    polyps x 2 - internal hemorrhoids - adenomatous polyps - due every 5 yrs     COLONOSCOPY N/A 2/21/2017    diverticula x 1 - due 5 yrs     ESOPHAGOSCOPY, GASTROSCOPY, DUODENOSCOPY (EGD), COMBINED  8/20/2013    Esophagitis LA Grade A, esophageal stricture     HC REPAIR ING HERNIA,5+Y/O,REDUCIBL  1978    rt     STRESS ECHO (METRO)  3/12    normal        Family History:    family history includes Breast Cancer in Braden De Leon's maternal grandmother; Chronic Obstructive Pulmonary Disease in Braden De Leon's paternal grandfather; Connective Tissue Disorder (age of onset: 70) in Braden De Leon's father; Diabetes (age of onset: 55) in Braden De Leon's father; Hypertension (age of onset: 58) in Braden De Leon's mother; Kidney Cancer in Braden De Leon's brother; Other - See Comments in Braden De Leon's brother, maternal grandfather, and paternal grandmother.    Social History:   reports that Braden De Leon has never smoked. Braden De Leon has never used smokeless tobacco. Braden De Leon reports that Braden De Leon does not drink alcohol and does not use drugs.    PCP: Pepe Spear     Physical Exam     Patient Vitals for the past 24 hrs:   BP Pulse Resp SpO2   05/16/22 1518 (!) 175/105 87 -- 98 %   05/16/22 1316 (!) 169/97 -- 18 99 %        Physical  Exam  Constitutional: Pleasant. Cooperative.   Eyes: Pupils equally round and reactive  HENT: Head is normal in appearance. Oropharynx is normal with moist mucus membranes.  Cardiovascular: Regular rate and rhythm and without murmurs.  Respiratory: Normal respiratory effort, lungs are clear bilaterally.  GI: Abdomen is soft, non-tender, non-distended. No guarding, rebound, or rigidity.  Musculoskeletal: No asymmetry of the lower extremities, no tenderness to palpation.   Skin: Normal, without rash.  Neurologic: Cranial nerves II-XII intact, nl cognition, no focal deficits. Alert and oriented x 3. Normal  strength. Normal leg raise. Sensation to light touch intact throughout all 4 extremities. 5/5 strength with dorsiflexion and plantarflexion bilaterally. No pronator drift. Normal finger nose finger.   Psychiatric: Normal affect.  Nursing notes and vital signs reviewed.    Emergency Department Course   ECG:  ECG results from 05/16/22   EKG 12-lead, tracing only     Value    Systolic Blood Pressure     Diastolic Blood Pressure     Ventricular Rate 93    Atrial Rate 93    AL Interval 120    QRS Duration 144        QTc 522    P Axis -2    R AXIS -30    T Axis -18    Interpretation ECG      Sinus rhythm  Left axis deviation  Right bundle branch block  Voltage criteria for left ventricular hypertrophy  Abnormal ECG  No previous ECGs available  Confirmed by - EMERGENCY ROOM, PHYSICIAN ([id number]),  VALERIO MCGOWAN ([id number]) on 5/16/2022 3:50:18 PM         Imaging:  CT Abdomen Pelvis w Contrast   Final Result   IMPRESSION:    1.  Large hepatic mass lesion, occupying majority the right hepatic   lobe with several additional satellite lesions throughout both lobes.   Diagnosis of exclusion is neoplasm/metastases.  MRI could be helpful   prior to tissue sampling.   2.  Enlarged retrocaval lymph node, suspicious for disease   involvement.   3.  No additional evidence of primary or metastatic malignancy in the    abdomen or pelvis.      ABDIFATAH FRIED MD            SYSTEM ID:  [id number]      US Abdomen Limited (RUQ)   Final Result   IMPRESSION:   1.  Cholelithiasis without sonographic evidence of acute   cholecystitis.   2.  Enlarged, fatty infiltrated liver. Coarsened echogenicity could   represent cirrhosis.      ABEBA WARNER MD            SYSTEM ID:  [id number]           Laboratory:  Labs Ordered and Resulted from Time of ED Arrival to Time of ED Departure   COMPREHENSIVE METABOLIC PANEL - Abnormal       Result Value    Sodium 137      Potassium 3.2 (*)     Chloride 104      Carbon Dioxide (CO2) 25      Anion Gap 8      Urea Nitrogen 38 (*)     Creatinine 2.19 (*)     Calcium 8.2 (*)     Glucose 123 (*)     Alkaline Phosphatase 796 (*)      (*)     ALT 42      Protein Total 6.3 (*)     Albumin 1.7 (*)     Bilirubin Total 1.1      GFR Estimate 35 (*)    CBC WITH PLATELETS AND DIFFERENTIAL - Abnormal    WBC Count 11.9 (*)     RBC Count 4.62      Hemoglobin 12.4      Hematocrit 38.8      MCV 84      MCH 26.8      MCHC 32.0      RDW 14.7      Platelet Count 264      % Neutrophils 90      % Lymphocytes 5      % Monocytes 5      % Eosinophils 0      % Basophils 0      % Immature Granulocytes 0      NRBCs per 100 WBC 0      Absolute Neutrophils 10.6 (*)     Absolute Lymphocytes 0.6 (*)     Absolute Monocytes 0.6      Absolute Eosinophils 0.0      Absolute Basophils 0.0      Absolute Immature Granulocytes 0.1      Absolute NRBCs 0.0     LIPASE - Normal    Lipase 192     TROPONIN I - Normal    Troponin I High Sensitivity 36        Emergency Department Course:    Reviewed:  I reviewed nursing notes, vitals, past medical history and Care Everywhere    Assessments:   I obtained history and examined the patient as noted above.    I rechecked the patient and explained findings. Ordered CT.   I rechecked the patient and explained findings.     Consults:   Discussed the case with general  surgery.    Interventions:  Medications   diphenhydrAMINE (BENADRYL) injection 25 mg (25 mg Intravenous Given 5/16/22 1312)   metoclopramide (REGLAN) injection 10 mg (10 mg Intravenous Given 5/16/22 1314)   CT Scan Flush (65 mLs Intravenous Given 5/16/22 1558)   iopamidol (ISOVUE-370) solution 500 mL (100 mLs Intravenous Given 5/16/22 1558)        Disposition:  The patient was discharged to home.     Impression & Plan      Medical Decision Making:  Braden De Leon is a 52 year old adult who presents to the ED for evaluation of RUQ abdominal pain, nausea, vomiting, and diarrhea.  See HPI as above for additional details.  Vitals and physical exam as above.  Differential is broad and included biliary pathology, atypical ACS, SBO, hepatitis, pneumonia, pancreatitis, shingles, MSK, amongst others.  Initial work-up involved ultrasound of the RUQ as well as labs as above.  Lab work notable for markedly elevated alk phos at 796, however otherwise reassuring LFTs.  Bilirubin was not elevated.  No leukocytosis.  No suggestion for pancreatitis.  Ultrasound revealed evidence of cholelithiasis, however there was no evidence for cholecystitis.  Given atypical LFTs not entirely consistent with biliary pathology such as choledocholithiasis, however presence of gallstones as well as positive Mcgarry sign, I did contact general surgery to discuss the case.  Ultimately, elected to pursue CT of abdomen/pelvis for further differentiation of etiology of patient's symptoms.  Unfortunately, CT suggestive for liver mass with associated satellite lesions. It is unclear if patient's symptoms are secondary to this vs possible biliary colic or both. Patient able to tolerate PO here. Overall, felt that discharge was reasonable with plan for close outpatient f/u with [facility]. This was discussed with the patient. Fastpass used. Discussed very close return precautions with any new or worsening symptoms. Discussed reasons to return. All questions  answered. Patient discharged to home in stable condition.    Diagnosis:    ICD-10-CM    1. Liver mass  R16.0    2. Cholelithiasis  K80.20    3. Nausea and vomiting  R11.2    4. Elevated alkaline phosphatase level  R74.8    5. Decreased renal function  N28.9         Discharge Medications:  Discharge Medication List as of 5/16/2022  5:52 PM      START taking these medications    Details   ondansetron (ZOFRAN ODT) 4 MG ODT tab Take 1 tablet (4 mg) by mouth every 8 hours as needed for nausea or vomiting, Disp-10 tablet, R-0, Local Print              5/16/2022   Mitchell Mack PA-C     This record was created at least in part using electronic voice recognition software, so please excuse any typographical errors.       Mitchell Mack PA-C  05/16/22 1919

## 2022-05-17 NOTE — TELEPHONE ENCOUNTER
QuickBlox message sent    ED visit 5/16/22  Please review ed note and abdominal ct results -                                                              IMPRESSION:   1.  Large hepatic mass lesion, occupying majority the right hepatic  lobe with several additional satellite lesions throughout both lobes.  Diagnosis of exclusion is neoplasm/metastases.  MRI could be helpful  prior to tissue sampling.  2.  Enlarged retrocaval lymph node, suspicious for disease  involvement.  3.  No additional evidence of primary or metastatic malignancy in the  abdomen or pelvis.     ER AVS does not reccommended PCP follow up, but would you like to see him Dr Spear?     Outpatient oncology follow up is recommended information sent in QuickBlox message.     aMry MARTINEZ RN   [facility] Triage

## 2022-05-17 NOTE — PROGRESS NOTES
Clinic Care Coordination Contact  Community Health Worker Initial Outreach    Patient accepts CC: No, Pt declined needs for extra support, stated that he has f/u appt tomorrow on 5/18/22        Clinic Care Coordination Contact  SONAL Schwartz: Post-Discharge Note  SITUATION                                                      Admission:    Admission Date: 05/16/22   Reason for Admission: Abdominal Pain, Nausea, Vomiting, Diarrhea  Discharge:   Discharge Date: 05/16/22  Discharge Diagnosis: Abdominal Pain, Nausea, Vomiting, Diarrhea    BACKGROUND                                                      Per hospital discharge summary and inpatient provider notes:    Braden De Leon is a 52 year old adult who presents to the ED for evaluation of RUQ abdominal pain, nausea, vomiting, and diarrhea.  See HPI as above for additional details.  Vitals and physical exam as above.  Differential is broad and included biliary pathology, atypical ACS, SBO, hepatitis, pneumonia, pancreatitis, shingles, MSK, amongst others.    ASSESSMENT      Enrollment  Primary Care Care Coordination Status: Declined    Discharge Assessment  How are you doing now that you are home?: feeling the same  How are your symptoms? (Red Flag symptoms escalate to triage hotline per guidelines): Unchanged  Do you feel your condition is stable enough to be safe at home until your provider visit?: Yes  Does the patient have their discharge instructions? : Yes  Does the patient have questions regarding their discharge instructions? : No  Were you started on any new medications or were there changes to any of your previous medications? : Yes  Does the patient have all of their medications?: Yes  Do you have questions regarding any of your medications? : No  Do you have all of your needed medical supplies or equipment (DME)?  (i.e. oxygen tank, CPAP, cane, etc.): Yes  Discharge follow-up appointment scheduled within 14 calendar days? : Yes  Discharge Follow Up  Appointment Date: 05/18/22  Discharge Follow Up Appointment Scheduled with?: Specialty Care Provider    Post-op ([facility] CTA Only)  If the patient had a surgery or procedure, do they have any questions for a nurse?: No         PLAN                                                      Outpatient Plan:     No future appointments.    Call [facility]    Why: As needed if you do not hear from them in the next few days.  Contact: [address] [address] [phone]    For any urgent concerns, please contact our 24 hour nurse triage line: [phone] ([phone])         SOLE Reardon  [phone]  [facility]

## 2022-05-18 NOTE — TELEPHONE ENCOUNTER
Order for liver biopsy reviewed and approved per Dr. Lane with ultrasound guidance and conscious sedation. Patient has a current history and physical on file and would need to hold his aspirin for five days prior to procedure as well as a current Covid test.

## 2022-05-18 NOTE — TELEPHONE ENCOUNTER
Message handled by Nurse Triage with Roxanne - provider name: MARTÍN; Pt seen in ER for N/V, diarrhea. Found large liver mass, need to verify oncology is set up. Advise MRI of abd with and without contrast.       Called # [phone]   Pt called, noted is at oncology appt now, Dr. Maldonado sp? Pt noted he may be getting the MRI of abd today as well. Advised to call back if needing PCP to order or needing anything else or questions.     Pt did request refill of Cyclobenzaprine and he has been unable to sleep.   Pt not able to sleep for last several days. Inquired if able to increase dose of medication as well. Advised will discuss with PCP and send in new Rx.      Juan Daniel Turcios RN   [facility] [place] [facility]  .

## 2022-05-18 NOTE — TELEPHONE ENCOUNTER
Attempt # 1  Called # [phone]     Left a non detailed VM to call back at [phone] and ask for any available Triage Nurse.    Juan Daniel Turcios RN   [facility]

## 2022-05-18 NOTE — TELEPHONE ENCOUNTER
Pt noted his muscles are like vices, shoulders and mid back. Pt noted the tightness is causing inability to sleep. Pt noted the Trazodone is also no very effective. Pt reports is taking 50 mg of trazodone. Pt noted the muscle spasms are what is waking him at night. Pt unsure of what is cause of muscle spasms noting he is having a lot of stress recently and a lot of life events.     Writer acknowledged reasons and noted will discuss with PCP need to adjust both medications.     Pt stated he is having a biopsy on Friday of the liver.     Juan Daniel FUNEZ RN   [facility]

## 2022-05-18 NOTE — TELEPHONE ENCOUNTER
Message handled by Nurse Triage with Huddle - provider name: MARTÍN. ok for cyclobenzaprine, can increase trazodone but not concurrent with taking cyclobenzaprine as both have sedating effects.     Rx for cyclobenzaprine sent in.      Called # [phone]     Left a detailed VM about refill sent, can increase Trazodone to  mg but not to take with cyclobenzaprine, advised to take cyclobenzaprine couple hours prior to bedtime, then take trazodone at bedtime and if awaken with muscle pain could take cyclobenzaprine again if time has been long enough. Advised to call back with further questions or concerns.    Juan Daniel Turcios RN   [facility] [place] Triage

## 2022-05-19 NOTE — PROGRESS NOTES
Patient is coming today to [facility] for a pre procedure COVID swab. Please place an order for this test. Thank you

## 2022-05-19 NOTE — TELEPHONE ENCOUNTER
Patient called back and writer scheduled an fasting physical for the patient     Informed flexeril rx was sent in,  re advised patient education about trazadone -previous telephone encounter 5/18/22    Patient denied questions, verbalized understating and agreed to plan  Mary MARTINEZ RN   [facility] Triage

## 2022-05-20 NOTE — PROGRESS NOTES
Pt recovered 2 hours post biopsy and remained stable, left in satisfactory condition with his wife.

## 2022-05-20 NOTE — SEDATION DOCUMENTATION
Pt tolerated procedure well, 1 mg Versed and 50 mcg of Fentanyl were given for conscious sedation and pt tolerated procedure well. Dr Kumar performed procedure with no complications and pt will recover in Radiology for 2 hours.

## 2022-05-20 NOTE — IR NOTE
Interventional Radiology Pre-Procedure Sedation Assessment   Time of Assessment: 9:40 AM    Expected Level: Moderate Sedation    Indication: Sedation is required for the following type of Procedure: Biopsy    Sedation and procedural consent: Risks, benefits and alternatives were discussed with Patient    PO Intake: Appropriately NPO for procedure    ASA Class: Class 2 - MILD SYSTEMIC DISEASE, NO ACUTE PROBLEMS, NO FUNCTIONAL LIMITATIONS.    Mallampati: Grade 2:  Soft palate, base of uvula, tonsillar pillars, and portion of posterior pharyngeal wall visible    Lungs: Lungs Clear with good breath sounds bilaterally    Heart: Normal heart sounds and rate    History and physical reviewed and no updates needed. I have reviewed the lab findings, diagnostic data, medications, and the plan for sedation. I have determined this patient to be an appropriate candidate for the planned sedation and procedure and have reassessed the patient IMMEDIATELY PRIOR to sedation and procedure.    Luis Miguel Gaytan MD

## 2022-05-26 PROBLEM — C22.9: Status: ACTIVE | Noted: 2022-01-01

## 2022-06-01 NOTE — PROGRESS NOTES
[facility]  [place]    SUBJECTIVE    CC: Braden De Leon is an 52 year old male who presents for preventative health visit.     Patient has been advised of split billing requirements and indicates understanding: Yes     Healthy Habits:     Getting at least 3 servings of Calcium per day:  Yes    Bi-annual eye exam:  Yes    Dental care twice a year:  NO    Sleep apnea or symptoms of sleep apnea:  Daytime drowsiness    Diet:  Other    Frequency of exercise:  2-3 days/week    Duration of exercise:  Less than 15 minutes    Taking medications regularly:  Yes    Medication side effects:  None    PHQ-2 Total Score: 4    Additional concerns today:  No     Adenocarcinoma of liver - recent diagnosis - Dr Orr - increased fatigue and jaundice - using trazodone/cyclobenzaprine    Diabetes Follow-up - eye exams every 3 months - last 4/19/2022      How often are you checking your blood sugar? Regularly checks it - with his monitor    What concerns do you have today about your diabetes? None     Do you have any of these symptoms? (Select all that apply)  No numbness or tingling in feet.  No redness, sores or blisters on feet.  No complaints of excessive thirst.  No reports of blurry vision.  No significant changes to weight.    Have you had a diabetic eye exam in the last 12 months? Yes- Date of last eye exam: 4/19/2022,  Location: [facility]    Range low to 400+  Average fasting 248    Lab Results   Component Value Date    A1C 9.4 12/06/2021    A1C 8.5 04/07/2021    A1C 7.4 11/18/2019    A1C 7.4 06/25/2019    A1C 6.4 02/26/2019    A1C 6.5 10/09/2018             Hyperlipidemia Follow-Up      Are you regularly taking any medication or supplement to lower your cholesterol?   Yes- Crestor    Are you having muscle aches or other side effects that you think could be caused by your cholesterol lowering medication?  No     Recent Labs   Lab Test 04/07/21  0948 06/25/19  0932 02/16/16  1019 05/12/15  1040 09/09/14  1017    CHOL 147 133   < > 167 174   HDL 60 44   < > 44 41   LDL 59 57   < > 97 86   TRIG 140 162*   < > 129 235*   CHOLHDLRATIO  --   --   --  3.8 4.2    < > = values in this interval not displayed.     CKD 3A / Hypertension Follow-up - Dr Bustos      Do you check your blood pressure regularly outside of the clinic? No     Are you following a low salt diet? No    Are your blood pressures ever more than 140 on the top number (systolic) OR more   than 90 on the bottom number (diastolic), for example 140/90? No    BP Readings from Last 3 Encounters:   06/01/22 (!) 154/82   05/20/22 (!) 160/98   05/16/22 (!) 175/105     Creatinine   Date Value Ref Range Status   06/01/2022 1.87 (H) 0.52 - 1.25 mg/dL Final     Comment:     20 y and older Female0.52-1.04 mg/dL  20 y and older Male0.66-1.25 mg/dL    Varies with the amount of muscle mass present.    GICH  Female: 0.6-1.2 mg/dL  Male: 0.7-1.3 mg/dL     04/07/2021 1.92 (H) 0.66 - 1.25 mg/dL Final     GFR Estimate   Date Value Ref Range Status   06/01/2022 42 (L) >60 mL/min/1.73m2 Final     Comment:     GFR not calculated when sex unspecified or nonbinary.  Effective December 21, 2021 eGFRcr in adults is calculated using the 2021 CKD-EPI creatinine equation which includes age and gender (Agustina et al., NEJM, DOI: 10.1056/VFDFtx1560013)   04/07/2021 39 (L) >60 mL/min/[1.73_m2] Final     Comment:     Non  GFR Calc  Starting 12/18/2018, serum creatinine based estimated GFR (eGFR) will be   calculated using the Chronic Kidney Disease Epidemiology Collaboration   (CKD-EPI) equation.       Today's PHQ-2 Score:   PHQ-2 ( 1999 Pfizer) 5/31/2022   Q1: Little interest or pleasure in doing things 2   Q2: Feeling down, depressed or hopeless 2   PHQ-2 Score 4   PHQ-2 Total Score (12-17 Years)- Positive if 3 or more points; Administer PHQ-A if positive -   Q1: Little interest or pleasure in doing things More than half the days   Q2: Feeling down, depressed or hopeless Several  days   PHQ-2 Score 3     GERD - controlled with omeprazole    Nephrolithiasis - no issues    Migraine - 1 last 6 months    Lower extremity, twitching, stiffness    Delayed healing    Cold intolerance increasing    Abuse: Current or Past(Physical, Sexual or Emotional)- No  Do you feel safe in your environment? Yes    Have you ever done Advance Care Planning? (For example, a Health Directive, POLST, or a discussion with a medical provider or your loved ones about your wishes): No, advance care planning information given to patient to review.  Patient declined advance care planning discussion at this time.    Social History     Tobacco Use     Smoking status: Never Smoker     Smokeless tobacco: Never Used   Substance Use Topics     Alcohol use: No     If you drink alcohol do you typically have >3 drinks per day or >7 drinks per week? No    Alcohol Use 6/1/2022   Prescreen: >3 drinks/day or >7 drinks/week? -   Prescreen: >3 drinks/day or >7 drinks/week? No       Last PSA:   PSA   Date Value Ref Range Status   10/09/2018 0.81 0 - 4 ug/L Final     Comment:     Assay Method:  Chemiluminescence using Siemens Vista analyzer     Prostate Specific Antigen Screen   Date Value Ref Range Status   06/01/2022 0.73 0.00 - 4.00 ug/L Final     Comment:     Referemce Range:  0-4 ug/L       Reviewed orders with patient. Reviewed health maintenance and updated orders accordingly - Yes    Reviewed and updated as needed this visit by clinical staff   Tobacco  Allergies                 Reviewed and updated as needed this visit by Provider                   Health Maintenance     Colonoscopy:  Due, will consider   FIT:  NA              PSA:  pending   DEXA:  NA    Health Maintenance Due   Topic Date Due     Pneumococcal Vaccine: Pediatrics (0 to 5 Years) and At-Risk Patients (6 to 64 Years) (1 - PCV) Never done     DTAP/TDAP/TD IMMUNIZATION (2 - Td or Tdap) 04/01/2011     ZOSTER IMMUNIZATION (1 of 2) Never done     EYE EXAM  12/10/2021      COLORECTAL CANCER SCREENING  02/21/2022       Current Problem List    Patient Active Problem List   Diagnosis     Esophageal reflux     Hypertension goal BP (blood pressure) < 140/90     Type 2 diabetes, HbA1c goal < 7% (H)     Advanced directives, counseling/discussion     Migraine     Benign neoplasm of colon     Esophagitis     Esophageal stricture     Tarsal tunnel syndrome     Other enthesopathy of ankle and tarsus     ED (erectile dysfunction)     Hyperlipidemia with target LDL less than 70     Diabetic macular edema (H)     Type 2 diabetes mellitus with stable proliferative retinopathy, with long-term current use of insulin, unspecified laterality (H)     Cholelithiasis     Nephrolithiasis     Type 2 diabetes mellitus with diabetic nephropathy, with long-term current use of insulin (H)     Type 2 diabetes mellitus with diabetic neuropathy, with long-term current use of insulin (H)     Morbid obesity (H)     CKD (chronic kidney disease) stage 3, GFR 30-59 ml/min (H)     Adenocarcinoma of liver (H)       Past Medical History    Past Medical History:   Diagnosis Date     Adenocarcinoma of liver (H) 05/2022    Dr ASAD Orr - Stage IV - Bile ducts - Port - Chemo     Atypical nevus 03/2019    Dr Wright     Benign neoplasm of colon 11/07, 2/12    adenomatous polyps - due 5 yrs     Cholelithiasis      CKD (chronic kidney disease) stage 3, GFR 30-59 ml/min (H)      Depressive disorder On going     Diabetic macular edema (H)     bilateral - avastatin >10 each - Dr CANDELARIA Zuñiga     ED (erectile dysfunction)      Esophageal reflux 1995     Esophageal stricture 08/2013     Esophagitis 08/2013    LA Grade A     Hyperlipidemia LDL goal < 70      Hypertension goal BP (blood pressure) < 140/90 03/2009     Liver mass 05/2022    Dr ASAD Orr     Migraine 1986    rare migraines 2/yr     Nephrolithiasis 10/2018    4 mm - urology     Obesity (BMI 30.0-34.9)      Other premature beats 11/2007    bigeminy     Proliferative diabetic  retinopathy (H) 06/2017    [facility] - Avastatin - Dr Rosalva Zuñiga     Type 2 diabetes mellitus with complications (H) 1997    neuropathy, retinopathy, nephropathy     Unspecified gastritis and gastroduodenitis without mention of hemorrhage 1995       Past Surgical History    Past Surgical History:   Procedure Laterality Date     BIOPSY       COLONOSCOPY  11/07/2002    polyps x 2 - internal hemorrhoids - adenomatous polyps - due every 5 yrs     COLONOSCOPY N/A 02/21/2017    diverticula x 1 - due 5 yrs     ESOPHAGOSCOPY, GASTROSCOPY, DUODENOSCOPY (EGD), COMBINED  08/20/2013    Esophagitis LA Grade A, esophageal stricture     HC REPAIR ING HERNIA,5+Y/O,REDUCIBL  1978    rt     STRESS ECHO (METRO)  03/2012    normal       Current Medications    Current Outpatient Medications   Medication Sig Dispense Refill     amLODIPine (NORVASC) 5 MG tablet Take 1 tablet (5 mg) by mouth daily 90 tablet 3     glimepiride (AMARYL) 4 MG tablet TAKE 2 TABLETS BY MOUTH DAILY AS DIRECTED 180 tablet 3     insulin glargine (LANTUS PEN) 100 UNIT/ML pen Inject 54-60 units subcutaneous BID as directed, total daily dose approx 114 units 36 mL 11     Insulin Lispro (HUMALOG KWIKPEN) 200 UNIT/ML soln Inject 20-40 units subcutaneous with meals and correction doses as directed, total daily dose approx 90 units 27 mL 11     losartan (COZAAR) 100 MG tablet Take 1 tablet (100 mg) by mouth daily 90 tablet 3     omeprazole (PRILOSEC) 40 MG DR capsule Take 1 capsule (40 mg) by mouth daily 90 capsule 3     rosuvastatin (CRESTOR) 20 MG tablet Take 1 tablet (20 mg) by mouth daily 90 tablet 3     sertraline (ZOLOFT) 100 MG tablet Take 2 tablets (200 mg) by mouth daily 180 tablet 3     sildenafil (VIAGRA) 100 MG tablet Take 0.5-1 tablets ( mg) by mouth daily as needed 18 tablet 3     traZODone (DESYREL) 50 MG tablet Take 0.5-1 tablets (25-50 mg) by mouth At Bedtime 90 tablet 3     aspirin 81 MG tablet Take 1 tablet (81 mg) by mouth daily 30 tablet      blood  glucose monitoring (SOFTCLIX) lancets USE WHEN TESTING BLOOD SUGARS TWICE DAILY. 2 Box 3     Continuous Blood Gluc Sensor (FREESTYLE CHRISTIANE 14 DAY SENSOR) [facility] USE WITH FREESTYLE FLASH . REPLACE SENSOR EVERY 14 DAYS 2 each 11     cyclobenzaprine (FLEXERIL) 5 MG tablet TAKE 1 TO 2 TABLETS(5 TO 10 MG) BY MOUTH EVERY NIGHT AS NEEDED FOR MUSCLE SPASMS 20 tablet 0     insulin pen needle (NOVOFINE PLUS) 32G X 4 MM miscellaneous Uses 5 pen needles daily 450 each 3     naproxen sodium (ALEVE) 220 MG tablet Take 2 tablets (440 mg) by mouth At Bedtime 180 tablet 3       Allergies    Allergies   Allergen Reactions     Metformin Hcl [Riomet]      Nausea and throat tightening     Penicillins      anaphylaxis     Thiethylperazine Maleate      Torecan Throat Swelling       Immunizations    Immunization History   Administered Date(s) Administered     COVID-19,PF,Moderna 2022     COVID-19,PF,Pfizer (12+ Yrs) 2021, 2021     TD (ADULT, 7+) 2001       Family History    Family History   Problem Relation Age of Onset     Hypertension Mother 58     Connective Tissue Disorder Father 70        legionaires, shingles     Diabetes Father 55     Other - See Comments Brother         MVA     Kidney Cancer Brother      Breast Cancer Maternal Grandmother      Other - See Comments Maternal Grandfather          in WWII     Other - See Comments Paternal Grandmother         Train     Chronic Obstructive Pulmonary Disease Paternal Grandfather      Colon Cancer No family hx of        Social History    Social History     Socioeconomic History     Marital status:      Spouse name: Lela     Number of children: 1     Years of education: 13     Highest education level: Not on file   Occupational History     Occupation: manage restuarant     Employer: [employer]   Tobacco Use     Smoking status: Never Smoker     Smokeless tobacco: Never Used   Vaping Use     Vaping Use: Never used   Substance and Sexual  Activity     Alcohol use: No     Drug use: No     Sexual activity: Yes     Partners: Female     Birth control/protection: Female Surgical   Other Topics Concern      Service Yes     Comment: [employer]     Blood Transfusions Not Asked     Caffeine Concern Yes     Comment: 24 oz diet     Occupational Exposure Not Asked     Hobby Hazards Not Asked     Sleep Concern Not Asked     Stress Concern Not Asked     Weight Concern Not Asked     Special Diet Not Asked     Back Care Not Asked     Exercise Yes     Comment: 3-5/wk, Ref's     Bike Helmet Not Asked     Seat Belt Yes     Self-Exams Not Asked     Parent/sibling w/ CABG, MI or angioplasty before 65F 55M? No   Social History Narrative     Not on file     Social Determinants of Health     Financial Resource Strain: Not on file   Food Insecurity: Not on file   Transportation Needs: Not on file   Physical Activity: Not on file   Stress: Not on file   Social Connections: Not on file   Intimate Partner Violence: Not on file   Housing Stability: Not on file       ROS    CONSTITUTIONAL: NEGATIVE for fever, chills, change in weight  INTEGUMENTARY/SKIN: NEGATIVE for worrisome rashes, moles or lesions  EYES: NEGATIVE for vision changes or irritation  ENT/MOUTH: NEGATIVE for ear, mouth and throat problems  RESP: NEGATIVE for significant cough or SOB  CV: NEGATIVE for chest pain, palpitations or peripheral edema  GI: NEGATIVE for nausea, abdominal pain, heartburn, or change in bowel habits  : NEGATIVE for frequency, dysuria, or hematuria  MUSCULOSKELETAL: NEGATIVE for significant arthralgias or myalgia  NEURO: NEGATIVE for weakness, dizziness or paresthesias  ENDOCRINE: NEGATIVE for temperature intolerance, skin/hair changes  HEME: NEGATIVE for bleeding problems  PSYCHIATRIC: NEGATIVE for changes in mood or affect    OBJECTIVE    BP (!) 154/82 (BP Location: Right arm, Cuff Size: Adult Regular)   Pulse 103   Temp 97.4  F (36.3  C) (Tympanic)   Ht 1.829 m (6')   Wt 103.4 kg  (228 lb)   SpO2 97%   BMI 30.92 kg/m      EXAM:    GENERAL: healthy, alert and no distress  EYES: Eyes grossly normal to inspection, PERRL and conjunctivae and sclerae normal  HENT: ear canals and TM's normal, nose and mouth without ulcers or lesions  NECK: no adenopathy, no asymmetry, masses, or scars and thyroid normal to palpation  RESP: lungs clear to auscultation - no rales, rhonchi or wheezes  CV: regular rate and rhythm, normal S1 S2, no S3 or S4, no murmur, click or rub, no peripheral edema and peripheral pulses strong  ABDOMEN: soft, nontender, no hepatosplenomegaly, no masses and bowel sounds normal   (male): pt declines  RECTAL: pt declines  MS: no gross musculoskeletal defects noted, no edema  SKIN: no suspicious lesions or rashes  NEURO: Normal strength and tone, mentation intact and speech normal  PSYCH: mentation appears normal, affect normal/bright  LYMPH: no cervical, supraclavicular, axillary, or inguinal adenopathy  Diabetic foot exam: normal DP and PT pulses, no trophic changes or ulcerative lesions, normal sensory exam and right foot digits 1 & 2 with redness, healing blister, patients notes significantly better - heat injury    DIAGNOSTICS/PROCEDURES    Pending    ASSESSMENT      ICD-10-CM    1. Routine general medical examination at a health care facility  Z00.00 Comprehensive metabolic panel     Lipid panel reflex to direct LDL Fasting     CBC with platelets     CK total     UA reflex to Microscopic and Culture     Albumin Random Urine Quantitative with Creat Ratio     TSH with free T4 reflex     Prostate Specific Antigen Screen     Hemoglobin A1c     REVIEW OF HEALTH MAINTENANCE PROTOCOL ORDERS     Magnesium     UA reflex to Microscopic and Culture     Albumin Random Urine Quantitative with Creat Ratio     Urine Microscopic     Comprehensive metabolic panel     Lipid panel reflex to direct LDL Fasting     CBC with platelets     CK total     TSH with free T4 reflex     Prostate Specific  Antigen Screen     Hemoglobin A1c     Magnesium     CANCELED: Fecal colorectal cancer screen FIT   2. Adenocarcinoma of liver (H)  C22.9 Comprehensive metabolic panel     CBC with platelets     REVIEW OF HEALTH MAINTENANCE PROTOCOL ORDERS     OFFICE/OUTPT VISIT,EST,LEVL IV     Comprehensive metabolic panel     CBC with platelets   3. Type 2 diabetes mellitus with stage 3a chronic kidney disease, with long-term current use of insulin (H)  E11.22 Comprehensive metabolic panel    N18.31 Lipid panel reflex to direct LDL Fasting    Z79.4 UA reflex to Microscopic and Culture     Albumin Random Urine Quantitative with Creat Ratio     Hemoglobin A1c     REVIEW OF HEALTH MAINTENANCE PROTOCOL ORDERS     OFFICE/OUTPT VISIT,EST,LEVL IV     losartan (COZAAR) 100 MG tablet     FOOT EXAM     UA reflex to Microscopic and Culture     Albumin Random Urine Quantitative with Creat Ratio     Urine Microscopic     Comprehensive metabolic panel     Lipid panel reflex to direct LDL Fasting     Hemoglobin A1c   4. Type 2 diabetes mellitus with diabetic neuropathy, with long-term current use of insulin (H)  E11.40 Comprehensive metabolic panel    Z79.4 Lipid panel reflex to direct LDL Fasting     UA reflex to Microscopic and Culture     Albumin Random Urine Quantitative with Creat Ratio     Hemoglobin A1c     REVIEW OF HEALTH MAINTENANCE PROTOCOL ORDERS     OFFICE/OUTPT VISIT,EST,LEVL IV     glimepiride (AMARYL) 4 MG tablet     Insulin Lispro (HUMALOG KWIKPEN) 200 UNIT/ML soln     losartan (COZAAR) 100 MG tablet     rosuvastatin (CRESTOR) 20 MG tablet     FOOT EXAM     UA reflex to Microscopic and Culture     Albumin Random Urine Quantitative with Creat Ratio     Urine Microscopic     Comprehensive metabolic panel     Lipid panel reflex to direct LDL Fasting     Hemoglobin A1c   5. Type 2 diabetes mellitus with diabetic nephropathy, with long-term current use of insulin (H)  E11.21 rosuvastatin (CRESTOR) 20 MG tablet    Z79.4    6. Type 2  diabetes mellitus with stable proliferative retinopathy, with long-term current use of insulin, unspecified laterality (H)  E11.3559 Comprehensive metabolic panel    Z79.4 Lipid panel reflex to direct LDL Fasting     UA reflex to Microscopic and Culture     Albumin Random Urine Quantitative with Creat Ratio     Hemoglobin A1c     REVIEW OF HEALTH MAINTENANCE PROTOCOL ORDERS     OFFICE/OUTPT VISIT,EST,LEVL IV     glimepiride (AMARYL) 4 MG tablet     insulin glargine (LANTUS PEN) 100 UNIT/ML pen     Insulin Lispro (HUMALOG KWIKPEN) 200 UNIT/ML soln     losartan (COZAAR) 100 MG tablet     rosuvastatin (CRESTOR) 20 MG tablet     FOOT EXAM     UA reflex to Microscopic and Culture     Albumin Random Urine Quantitative with Creat Ratio     Urine Microscopic     Comprehensive metabolic panel     Lipid panel reflex to direct LDL Fasting     Hemoglobin A1c   7. Type 2 diabetes, HbA1c goal < 7% (H)  E11.9 Comprehensive metabolic panel     Lipid panel reflex to direct LDL Fasting     UA reflex to Microscopic and Culture     Albumin Random Urine Quantitative with Creat Ratio     Hemoglobin A1c     REVIEW OF HEALTH MAINTENANCE PROTOCOL ORDERS     OFFICE/OUTPT VISIT,EST,LEVL IV     losartan (COZAAR) 100 MG tablet     FOOT EXAM     UA reflex to Microscopic and Culture     Albumin Random Urine Quantitative with Creat Ratio     Urine Microscopic     Comprehensive metabolic panel     Lipid panel reflex to direct LDL Fasting     Hemoglobin A1c   8. Stage 3a chronic kidney disease (H)  N18.31 Comprehensive metabolic panel     CBC with platelets     UA reflex to Microscopic and Culture     Albumin Random Urine Quantitative with Creat Ratio     REVIEW OF HEALTH MAINTENANCE PROTOCOL ORDERS     OFFICE/OUTPT VISIT,EST,LEVL IV     losartan (COZAAR) 100 MG tablet     UA reflex to Microscopic and Culture     Albumin Random Urine Quantitative with Creat Ratio     Urine Microscopic     Comprehensive metabolic panel     CBC with platelets   9.  Diabetic macular edema (H)  E11.311 Comprehensive metabolic panel     CBC with platelets     UA reflex to Microscopic and Culture     Albumin Random Urine Quantitative with Creat Ratio     Hemoglobin A1c     REVIEW OF HEALTH MAINTENANCE PROTOCOL ORDERS     OFFICE/OUTPT VISIT,EST,LEVL IV     UA reflex to Microscopic and Culture     Albumin Random Urine Quantitative with Creat Ratio     Urine Microscopic     Comprehensive metabolic panel     CBC with platelets     Hemoglobin A1c   10. Hypertension goal BP (blood pressure) < 140/90  I10 Comprehensive metabolic panel     UA reflex to Microscopic and Culture     Albumin Random Urine Quantitative with Creat Ratio     REVIEW OF HEALTH MAINTENANCE PROTOCOL ORDERS     OFFICE/OUTPT VISIT,EST,LEVL IV     amLODIPine (NORVASC) 5 MG tablet     losartan (COZAAR) 100 MG tablet     UA reflex to Microscopic and Culture     Albumin Random Urine Quantitative with Creat Ratio     Urine Microscopic     Comprehensive metabolic panel   11. Hyperlipidemia with target LDL less than 70  E78.5 Comprehensive metabolic panel     Lipid panel reflex to direct LDL Fasting     CK total     REVIEW OF HEALTH MAINTENANCE PROTOCOL ORDERS     OFFICE/OUTPT VISIT,EST,LEVL IV     rosuvastatin (CRESTOR) 20 MG tablet     Comprehensive metabolic panel     Lipid panel reflex to direct LDL Fasting     CK total   12. Nephrolithiasis  N20.0 UA reflex to Microscopic and Culture     Albumin Random Urine Quantitative with Creat Ratio     REVIEW OF HEALTH MAINTENANCE PROTOCOL ORDERS     OFFICE/OUTPT VISIT,EST,LEVL IV     UA reflex to Microscopic and Culture     Albumin Random Urine Quantitative with Creat Ratio     Urine Microscopic   13. Mood changes  R45.86 sertraline (ZOLOFT) 100 MG tablet   14. Primary insomnia  F51.01 traZODone (DESYREL) 50 MG tablet   15. Gastroesophageal reflux disease without esophagitis  K21.9 CBC with platelets     REVIEW OF HEALTH MAINTENANCE PROTOCOL ORDERS     OFFICE/OUTPT VISIT,EST,LEVL  IV     omeprazole (PRILOSEC) 40 MG DR capsule     CBC with platelets   16. Migraine with aura and without status migrainosus, not intractable  G43.109 REVIEW OF HEALTH MAINTENANCE PROTOCOL ORDERS     OFFICE/OUTPT VISIT,EST,LEVL IV   17. Twitching  R25.3 Magnesium     Ferritin     OFFICE/OUTPT VISIT,EST,LEVL IV     Magnesium     Ferritin   18. Cold intolerance  R68.89 TSH with free T4 reflex     OFFICE/OUTPT VISIT,EST,LEVL IV     TSH with free T4 reflex   19. Erectile dysfunction, unspecified erectile dysfunction type  N52.9 sildenafil (VIAGRA) 100 MG tablet   20. Class 1 obesity with serious comorbidity and body mass index (BMI) of 30.0 to 30.9 in adult, unspecified obesity type  E66.9 OFFICE/OUTPT VISIT,EST,LEVL IV    Z68.30    21. Benign neoplasm of colon, unspecified part of colon  D12.6 REVIEW OF HEALTH MAINTENANCE PROTOCOL ORDERS     OFFICE/OUTPT VISIT,EST,LEVL IV     CANCELED: Fecal colorectal cancer screen FIT   22. Screen for colon cancer  Z12.11 REVIEW OF HEALTH MAINTENANCE PROTOCOL ORDERS     CANCELED: Fecal colorectal cancer screen FIT   23. Screening for prostate cancer  Z12.5 Prostate Specific Antigen Screen     REVIEW OF HEALTH MAINTENANCE PROTOCOL ORDERS     Prostate Specific Antigen Screen   24. Medication monitoring encounter  Z51.81 Comprehensive metabolic panel     Lipid panel reflex to direct LDL Fasting     CBC with platelets     CK total     UA reflex to Microscopic and Culture     Albumin Random Urine Quantitative with Creat Ratio     TSH with free T4 reflex     Hemoglobin A1c     REVIEW OF HEALTH MAINTENANCE PROTOCOL ORDERS     UA reflex to Microscopic and Culture     Albumin Random Urine Quantitative with Creat Ratio     Urine Microscopic     Comprehensive metabolic panel     Lipid panel reflex to direct LDL Fasting     CBC with platelets     CK total     TSH with free T4 reflex     Hemoglobin A1c   25. Encounter for screening for COVID-19  Z11.52 REVIEW OF HEALTH MAINTENANCE PROTOCOL ORDERS      Asymptomatic COVID-19 Virus (Coronavirus) by PCR     Asymptomatic COVID-19 Virus (Coronavirus) by PCR Nose       PLAN    Discussed treatment/modality options, including risk and benefits, Braden De Leon desires:    Dr Orr, advised alcohol consumption 1oz per day or less, advised 1 multivitamin per day, advised calcium 8413-2319 mg/d and Vitamin D 800-1200 IU/d, advised dentist every 6 months, advised diet, exercise, and weight loss, advised opthalmologist every 1 years, advised self testicular exam q month, further health care maintenance, further lab(s), immunization(s), medication refill(s), referral(s) and observation    Discussed controversies surrounding PSA. Specifically reviewed 2017 USPSTF findings recommending discussion of PSA testing for men ages 55-69.  Reviewed findings of the  Randomized Study of Screening for Prostate Cancer which showed a 30% reduction in advanced stage prostate cancer and a 20% reduction in death rate from prostate cancer in this age group. PSA-based screening may prevent up to 2 deaths and up to 3 cases of metastatic disease per 1,000 men screened over 13 years.    We've elected to do PSA this year after discussing these controversies.    All diagnosis above reviewed and noted above, otherwise stable.      See [facility] orders for further details.      1) Dr Orr, consider second opinion    2) wound cares    3) decrease sertaline to 200 mg daily, consider welbutrin    4) declines immunizations, will review with Dr Orr    5) Dr Bustos    Return in about 1 month (around 7/1/2022) for Medication Recheck Visit, Follow Up Acute, Follow Up Chronic.    Health Maintenance Due   Topic Date Due     Pneumococcal Vaccine: Pediatrics (0 to 5 Years) and At-Risk Patients (6 to 64 Years) (1 - PCV) Never done     DTAP/TDAP/TD IMMUNIZATION (2 - Td or Tdap) 04/01/2011     ZOSTER IMMUNIZATION (1 of 2) Never done     EYE EXAM  12/10/2021     COLORECTAL CANCER SCREENING  02/21/2022        COUNSELING    Reviewed preventive health counseling, as reflected in patient instructions    BP Readings from Last 1 Encounters:   06/01/22 (!) 154/82     Estimated body mass index is 30.92 kg/m  as calculated from the following:    Height as of this encounter: 1.829 m (6').    Weight as of this encounter: 103.4 kg (228 lb).      Weight management plan: diet and exercise     reports that Braden De Leon has never smoked. Braden De Leon has never used smokeless tobacco.      Counseling Resources:    ATP IV Guidelines  Pooled Cohorts Equation Calculator  FRAX Risk Assessment  ICSI Preventive Guidelines  Dietary Guidelines for Americans, 2010  USDA's MyPlate  ASA Prophylaxis  Lung CA Screening           Pepe Spear MD, FAAFP     [facility]  [address] [place] [id number]  [email or web address]   Office: [phone]  Fax: [phone]  Pager: [phone]

## 2022-06-06 NOTE — TELEPHONE ENCOUNTER
Patient spoke with RN today and went over results.     Dahlia Chong RN  [facility]

## 2022-06-07 NOTE — PROGRESS NOTES
Braden is a 52 year old who is being evaluated via a billable video visit.      How would you like to obtain your AVS? MyChart  If the video visit is dropped, the invitation should be resent by: Text to cell phone: [phone]  Will anyone else be joining your video visit? No      Video Start Time: 12:34 PM    Assessment & Plan       ICD-10-CM    1. Adenocarcinoma of liver (H)  C22.9    2. Type 2 diabetes mellitus with stage 3a chronic kidney disease, with long-term current use of insulin (H)  E11.22     N18.31     Z79.4    3. Type 2 diabetes mellitus with diabetic neuropathy, with long-term current use of insulin (H)  E11.40     Z79.4    4. Type 2 diabetes mellitus with diabetic nephropathy, with long-term current use of insulin (H)  E11.21     Z79.4    5. Type 2 diabetes mellitus with stable proliferative retinopathy, with long-term current use of insulin, unspecified laterality (H)  E11.3559     Z79.4    6. Type 2 diabetes, HbA1c goal < 7% (H)  E11.9    7. Stage 3a chronic kidney disease (H)  N18.31    8. Diabetic macular edema (H)  E11.311    9. Hypertension goal BP (blood pressure) < 140/90  I10    10. Hyperlipidemia with target LDL less than 70  E78.5    11. Nephrolithiasis  N20.0    12. Mood changes  R45.86    13. Primary insomnia  F51.01    14. Gastroesophageal reflux disease without esophagitis  K21.9    15. Migraine with aura and without status migrainosus, not intractable  G43.109    16. Twitching  R25.3    17. Cold intolerance  R68.89    18. Class 1 obesity with serious comorbidity and body mass index (BMI) of 30.0 to 30.9 in adult, unspecified obesity type  E66.9     Z68.30    19. Medication monitoring encounter  Z51.81        Discussed treatment/modality options, including risk and benefits, Braden ORDOÑEZ Breeshayla desires:    1) Dr Orr - at 4 pm today - holding on Port - awaiting treating options     2) CMP / CBC / BNP prn    3) home care / palliative care / hospice prn    All diagnosis above reviewed and  noted above, otherwise stable.      See [facility] orders for further details.      Return in about 1 week (around 6/14/2022) for Medication Recheck Visit, Follow Up Acute, Follow Up Chronic.    No LOS data to display    Doing chart review, history and exam, documentation and further activities as noted.           Pepe Spear MD, FAAFP     [facility]  [address] [place] [id number]  [email or web address]   Office: [phone]  Fax: [phone]  Pager: [phone]       Ana Lilia Feliciano is a 52 year old who presents for the following health issues     History of Present Illness       Reason for visit:  Follow up    Bradne De Leon eats 2-3 servings of fruits and vegetables daily.Braden De Leon consumes 0 sweetened beverage(s) daily.Braden De Leon exercises with enough effort to increase Braden De Leon's heart rate 10 to 19 minutes per day.  Braden De Leon exercises with enough effort to increase Braden De Leon's heart rate 4 days per week. Braden De Leon is missing 1 dose(s) of medications per week.  Braden De Leon is not taking prescribed medications regularly due to cost of medication.     6/7/2022    Recent diagnosis - adenocarcinoma of the liver with mets - Dr Kirby TERRAZAS    Notes increasing fatigue/jaundice    Extensive review of recent labs    Poor lipid control  Elevated liver enzymes  Elevated alk phos  Elevated bilirubin   Elevated ferritin  Decreased kidney function, but stable  Low albumin  Good diabetes control  Mildly elevated white count  Decreased hemoglobin  Very high protein in your urine    6/1/2022    Adenocarcinoma of liver - recent diagnosis - Dr Orr - increased fatigue and jaundice - using trazodone/cyclobenzaprine     Diabetes Follow-up - eye exams every 3 months - last 4/19/2022       How often are you checking your blood sugar? Regularly checks it - with his monitor    What concerns do you have  today about your diabetes? None     Do you have any of these symptoms? (Select all that apply)  No numbness or tingling in feet.  No redness, sores or blisters on feet.  No complaints of excessive thirst.  No reports of blurry vision.  No significant changes to weight.    Have you had a diabetic eye exam in the last 12 months? Yes- Date of last eye exam: 4/19/2022,  Location: [facility]     Range low to 400+  Average fasting 248           Lab Results   Component Value Date     A1C 9.4 12/06/2021     A1C 8.5 04/07/2021     A1C 7.4 11/18/2019     A1C 7.4 06/25/2019     A1C 6.4 02/26/2019     A1C 6.5 10/09/2018      Hyperlipidemia Follow-Up       Are you regularly taking any medication or supplement to lower your cholesterol?   Yes- Crestor    Are you having muscle aches or other side effects that you think could be caused by your cholesterol lowering medication?  No              Recent Labs   Lab Test 04/07/21  0948 06/25/19  0932 02/16/16  1019 05/12/15  1040 09/09/14  1017   CHOL 147 133   < > 167 174   HDL 60 44   < > 44 41   LDL 59 57   < > 97 86   TRIG 140 162*   < > 129 235*   CHOLHDLRATIO  --   --   --  3.8 4.2    < > = values in this interval not displayed.      CKD 3A / Hypertension Follow-up - Dr Bustos       Do you check your blood pressure regularly outside of the clinic? No     Are you following a low salt diet? No    Are your blood pressures ever more than 140 on the top number (systolic) OR more       than 90 on the bottom number (diastolic), for example 140/90? No         BP Readings from Last 3 Encounters:   06/01/22 (!) 154/82   05/20/22 (!) 160/98   05/16/22 (!) 175/105              Creatinine   Date Value Ref Range Status   06/01/2022 1.87 (H) 0.52 - 1.25 mg/dL Final       Comment:       20 y and older Female0.52-1.04 mg/dL  20 y and older Male0.66-1.25 mg/dL     Varies with the amount of muscle mass present.     GICH  Female: 0.6-1.2 mg/dL  Male: 0.7-1.3 mg/dL      04/07/2021 1.92 (H) 0.66  - 1.25 mg/dL Final              GFR Estimate   Date Value Ref Range Status   06/01/2022 42 (L) >60 mL/min/1.73m2 Final       Comment:       GFR not calculated when sex unspecified or nonbinary.  Effective December 21, 2021 eGFRcr in adults is calculated using the 2021 CKD-EPI creatinine equation which includes age and gender (Agustina et al., NE, DOI: 10.1056/MQRDxq7762472)   04/07/2021 39 (L) >60 mL/min/[1.73_m2] Final       Comment:       Non  GFR Calc  Starting 12/18/2018, serum creatinine based estimated GFR (eGFR) will be   calculated using the Chronic Kidney Disease Epidemiology Collaboration   (CKD-EPI) equation.         Today's PHQ-2 Score:   PHQ-2 ( 1999 [employer]) 5/31/2022   Q1: Little interest or pleasure in doing things 2   Q2: Feeling down, depressed or hopeless 2   PHQ-2 Score 4   PHQ-2 Total Score (12-17 Years)- Positive if 3 or more points; Administer PHQ-A if positive -   Q1: Little interest or pleasure in doing things More than half the days   Q2: Feeling down, depressed or hopeless Several days   PHQ-2 Score 3      GERD - controlled with omeprazole     Nephrolithiasis - no issues     Migraine - 1 last 6 months     Lower extremity, twitching, stiffness     Delayed healing     Cold intolerance increasing    Review of Systems   CONSTITUTIONAL: NEGATIVE for fever, chills, change in weight  INTEGUMENTARY/SKIN: NEGATIVE for worrisome rashes, moles or lesions  EYES: NEGATIVE for vision changes or irritation  ENT/MOUTH: NEGATIVE for ear, mouth and throat problems  RESP: NEGATIVE for significant cough or SOB  CV: NEGATIVE for chest pain, palpitations or peripheral edema  GI: NEGATIVE for nausea, abdominal pain, heartburn, or change in bowel habits  : NEGATIVE for frequency, dysuria, or hematuria  MUSCULOSKELETAL: NEGATIVE for significant arthralgias or myalgia  NEURO: NEGATIVE for weakness, dizziness or paresthesias  ENDOCRINE: NEGATIVE for temperature intolerance, skin/hair changes  HEME:  NEGATIVE for bleeding problems  PSYCHIATRIC: NEGATIVE for changes in mood or affect      Objective           Vitals:  No vitals were obtained today due to virtual visit.    Physical Exam   GENERAL: Healthy, alert and no distress  EYES: Eyes grossly normal to inspection.  No discharge or erythema, or obvious scleral/conjunctival abnormalities.  RESP: No audible wheeze, cough, or visible cyanosis.  No visible retractions or increased work of breathing.    SKIN: Visible skin clear. No significant rash, abnormal pigmentation or lesions.  NEURO: Cranial nerves grossly intact.  Mentation and speech appropriate for age.  PSYCH: Mentation appears normal, affect normal/bright, judgement and insight intact, normal speech and appearance well-groomed.    Reviewed recent labs and imaging      Video-Visit Details    Type of service:  Video Visit    Video End Time: 1:04 pm    Originating Location (pt. Location): Home    Distant Location (provider location):  [facility]     Platform used for Video Visit: [employer]

## 2022-06-20 NOTE — TELEPHONE ENCOUNTER
Tricia calling from [facility] stating that their Medical Director stated for the patient to stop taking amlodipine, but patient wanted the verbal OK from Dr. pSear.     You can reach Tricia at [phone].     Robles Candelaria

## 2022-06-20 NOTE — TELEPHONE ENCOUNTER
Called # below     Left a detailed VM with the information below     Shreya Rivas RN, BSN  [facility]

## (undated) DEVICE — KIT ENDO TURNOVER/PROCEDURE CARRY-ON 101822

## (undated) RX ORDER — FENTANYL CITRATE 50 UG/ML
INJECTION, SOLUTION INTRAMUSCULAR; INTRAVENOUS
Status: DISPENSED
Start: 2022-01-01

## (undated) RX ORDER — FENTANYL CITRATE 50 UG/ML
INJECTION, SOLUTION INTRAMUSCULAR; INTRAVENOUS
Status: DISPENSED
Start: 2017-02-21